# Patient Record
Sex: MALE | Race: WHITE | Employment: OTHER | URBAN - METROPOLITAN AREA
[De-identification: names, ages, dates, MRNs, and addresses within clinical notes are randomized per-mention and may not be internally consistent; named-entity substitution may affect disease eponyms.]

---

## 2021-02-20 ENCOUNTER — HOSPITAL ENCOUNTER (EMERGENCY)
Age: 61
Discharge: HOME | End: 2021-02-21
Attending: EMERGENCY MEDICINE
Payer: MEDICARE

## 2021-02-20 ENCOUNTER — APPOINTMENT (OUTPATIENT)
Dept: GENERAL RADIOLOGY | Age: 61
End: 2021-02-20
Payer: MEDICARE

## 2021-02-20 DIAGNOSIS — R91.1 PULMONARY NODULE, RIGHT: ICD-10-CM

## 2021-02-20 DIAGNOSIS — R50.9 FEVER, UNSPECIFIED: ICD-10-CM

## 2021-02-20 DIAGNOSIS — T78.2XXA ANAPHYLAXIS, INITIAL ENCOUNTER: Primary | ICD-10-CM

## 2021-02-20 LAB
PRO-BNP: 597 PG/ML (ref 0–124)
PROCALCITONIN: 0.07 NG/ML (ref 0–0.15)

## 2021-02-20 PROCEDURE — 80053 COMPREHEN METABOLIC PANEL: CPT

## 2021-02-20 PROCEDURE — 83880 ASSAY OF NATRIURETIC PEPTIDE: CPT

## 2021-02-20 PROCEDURE — 96367 TX/PROPH/DG ADDL SEQ IV INF: CPT

## 2021-02-20 PROCEDURE — 2500000003 HC RX 250 WO HCPCS: Performed by: EMERGENCY MEDICINE

## 2021-02-20 PROCEDURE — 99285 EMERGENCY DEPT VISIT HI MDM: CPT

## 2021-02-20 PROCEDURE — 83605 ASSAY OF LACTIC ACID: CPT

## 2021-02-20 PROCEDURE — 2580000003 HC RX 258: Performed by: EMERGENCY MEDICINE

## 2021-02-20 PROCEDURE — 87635 SARS-COV-2 COVID-19 AMP PRB: CPT

## 2021-02-20 PROCEDURE — 6360000002 HC RX W HCPCS: Performed by: EMERGENCY MEDICINE

## 2021-02-20 PROCEDURE — 96372 THER/PROPH/DIAG INJ SC/IM: CPT

## 2021-02-20 PROCEDURE — 96375 TX/PRO/DX INJ NEW DRUG ADDON: CPT

## 2021-02-20 PROCEDURE — 84145 PROCALCITONIN (PCT): CPT

## 2021-02-20 PROCEDURE — 96366 THER/PROPH/DIAG IV INF ADDON: CPT

## 2021-02-20 PROCEDURE — 96365 THER/PROPH/DIAG IV INF INIT: CPT

## 2021-02-20 PROCEDURE — 71045 X-RAY EXAM CHEST 1 VIEW: CPT

## 2021-02-20 PROCEDURE — 85025 COMPLETE CBC W/AUTO DIFF WBC: CPT

## 2021-02-20 PROCEDURE — 87040 BLOOD CULTURE FOR BACTERIA: CPT

## 2021-02-20 RX ORDER — METHYLPREDNISOLONE SODIUM SUCCINATE 125 MG/2ML
125 INJECTION, POWDER, LYOPHILIZED, FOR SOLUTION INTRAMUSCULAR; INTRAVENOUS ONCE
Status: COMPLETED | OUTPATIENT
Start: 2021-02-20 | End: 2021-02-20

## 2021-02-20 RX ORDER — PREDNISONE 20 MG/1
60 TABLET ORAL ONCE
Status: DISCONTINUED | OUTPATIENT
Start: 2021-02-20 | End: 2021-02-20

## 2021-02-20 RX ORDER — 0.9 % SODIUM CHLORIDE 0.9 %
1000 INTRAVENOUS SOLUTION INTRAVENOUS ONCE
Status: COMPLETED | OUTPATIENT
Start: 2021-02-20 | End: 2021-02-21

## 2021-02-20 RX ORDER — KETOROLAC TROMETHAMINE 30 MG/ML
15 INJECTION, SOLUTION INTRAMUSCULAR; INTRAVENOUS ONCE
Status: COMPLETED | OUTPATIENT
Start: 2021-02-20 | End: 2021-02-20

## 2021-02-20 RX ADMIN — KETOROLAC TROMETHAMINE 15 MG: 30 INJECTION, SOLUTION INTRAMUSCULAR; INTRAVENOUS at 23:42

## 2021-02-20 RX ADMIN — EPINEPHRINE 0.3 MG: 1 INJECTION INTRAMUSCULAR; INTRAVENOUS; SUBCUTANEOUS at 23:38

## 2021-02-20 RX ADMIN — FAMOTIDINE 20 MG: 10 INJECTION, SOLUTION INTRAVENOUS at 23:07

## 2021-02-20 RX ADMIN — CEFTRIAXONE SODIUM 1000 MG: 1 INJECTION, POWDER, FOR SOLUTION INTRAMUSCULAR; INTRAVENOUS at 23:57

## 2021-02-20 RX ADMIN — SODIUM CHLORIDE 1000 ML: 9 INJECTION, SOLUTION INTRAVENOUS at 23:27

## 2021-02-20 RX ADMIN — METHYLPREDNISOLONE SODIUM SUCCINATE 125 MG: 125 INJECTION, POWDER, FOR SOLUTION INTRAMUSCULAR; INTRAVENOUS at 23:07

## 2021-02-21 ENCOUNTER — APPOINTMENT (OUTPATIENT)
Dept: CT IMAGING | Age: 61
End: 2021-02-21
Payer: MEDICARE

## 2021-02-21 VITALS
OXYGEN SATURATION: 98 % | SYSTOLIC BLOOD PRESSURE: 136 MMHG | RESPIRATION RATE: 20 BRPM | DIASTOLIC BLOOD PRESSURE: 84 MMHG | TEMPERATURE: 97.7 F | HEART RATE: 85 BPM | BODY MASS INDEX: 33.86 KG/M2 | HEIGHT: 72 IN | WEIGHT: 250 LBS

## 2021-02-21 LAB
A/G RATIO: 1.1 (ref 1.1–2.2)
ALBUMIN SERPL-MCNC: 3.4 G/DL (ref 3.4–5)
ALP BLD-CCNC: 75 U/L (ref 40–129)
ALT SERPL-CCNC: 17 U/L (ref 10–40)
ANION GAP SERPL CALCULATED.3IONS-SCNC: 14 MMOL/L (ref 3–16)
AST SERPL-CCNC: 16 U/L (ref 15–37)
BASOPHILS ABSOLUTE: 0.1 K/UL (ref 0–0.2)
BASOPHILS RELATIVE PERCENT: 0.4 %
BILIRUB SERPL-MCNC: 0.7 MG/DL (ref 0–1)
BUN BLDV-MCNC: 11 MG/DL (ref 7–20)
CALCIUM SERPL-MCNC: 8.7 MG/DL (ref 8.3–10.6)
CHLORIDE BLD-SCNC: 98 MMOL/L (ref 99–110)
CO2: 24 MMOL/L (ref 21–32)
CREAT SERPL-MCNC: 1 MG/DL (ref 0.8–1.3)
EOSINOPHILS ABSOLUTE: 0.2 K/UL (ref 0–0.6)
EOSINOPHILS RELATIVE PERCENT: 0.9 %
GFR AFRICAN AMERICAN: >60
GFR NON-AFRICAN AMERICAN: >60
GLOBULIN: 3.1 G/DL
GLUCOSE BLD-MCNC: 135 MG/DL (ref 70–99)
HCT VFR BLD CALC: 45.2 % (ref 40.5–52.5)
HEMOGLOBIN: 15.1 G/DL (ref 13.5–17.5)
LACTIC ACID, SEPSIS: 1.4 MMOL/L (ref 0.4–1.9)
LYMPHOCYTES ABSOLUTE: 3.1 K/UL (ref 1–5.1)
LYMPHOCYTES RELATIVE PERCENT: 17.2 %
MCH RBC QN AUTO: 32.6 PG (ref 26–34)
MCHC RBC AUTO-ENTMCNC: 33.4 G/DL (ref 31–36)
MCV RBC AUTO: 97.6 FL (ref 80–100)
MONOCYTES ABSOLUTE: 0.5 K/UL (ref 0–1.3)
MONOCYTES RELATIVE PERCENT: 2.6 %
NEUTROPHILS ABSOLUTE: 14.4 K/UL (ref 1.7–7.7)
NEUTROPHILS RELATIVE PERCENT: 78.9 %
PDW BLD-RTO: 12.7 % (ref 12.4–15.4)
PLATELET # BLD: 327 K/UL (ref 135–450)
PLATELET SLIDE REVIEW: ADEQUATE
PMV BLD AUTO: 9.2 FL (ref 5–10.5)
POTASSIUM REFLEX MAGNESIUM: 3.6 MMOL/L (ref 3.5–5.1)
RAPID INFLUENZA  B AGN: NEGATIVE
RAPID INFLUENZA A AGN: NEGATIVE
RBC # BLD: 4.63 M/UL (ref 4.2–5.9)
S PYO AG THROAT QL: NEGATIVE
SARS-COV-2, NAAT: NOT DETECTED
SODIUM BLD-SCNC: 136 MMOL/L (ref 136–145)
TOTAL PROTEIN: 6.5 G/DL (ref 6.4–8.2)
WBC # BLD: 18.3 K/UL (ref 4–11)

## 2021-02-21 PROCEDURE — 87880 STREP A ASSAY W/OPTIC: CPT

## 2021-02-21 PROCEDURE — 87804 INFLUENZA ASSAY W/OPTIC: CPT

## 2021-02-21 PROCEDURE — 6360000002 HC RX W HCPCS: Performed by: EMERGENCY MEDICINE

## 2021-02-21 PROCEDURE — 2580000003 HC RX 258: Performed by: EMERGENCY MEDICINE

## 2021-02-21 PROCEDURE — 87081 CULTURE SCREEN ONLY: CPT

## 2021-02-21 PROCEDURE — 6360000004 HC RX CONTRAST MEDICATION: Performed by: EMERGENCY MEDICINE

## 2021-02-21 PROCEDURE — 71260 CT THORAX DX C+: CPT

## 2021-02-21 PROCEDURE — 74176 CT ABD & PELVIS W/O CONTRAST: CPT

## 2021-02-21 RX ORDER — DOXYCYCLINE 100 MG/1
100 TABLET ORAL 2 TIMES DAILY
Qty: 20 TABLET | Refills: 0 | Status: SHIPPED | OUTPATIENT
Start: 2021-02-21 | End: 2021-03-03

## 2021-02-21 RX ORDER — PREDNISONE 50 MG/1
50 TABLET ORAL DAILY
Qty: 2 TABLET | Refills: 0 | Status: SHIPPED | OUTPATIENT
Start: 2021-02-21 | End: 2021-02-23

## 2021-02-21 RX ORDER — EPINEPHRINE 0.3 MG/.3ML
0.3 INJECTION SUBCUTANEOUS ONCE
Qty: 0.3 ML | Refills: 0 | Status: SHIPPED | OUTPATIENT
Start: 2021-02-21 | End: 2021-02-21

## 2021-02-21 RX ADMIN — DEXTROSE MONOHYDRATE 500 MG: 50 INJECTION, SOLUTION INTRAVENOUS at 00:32

## 2021-02-21 RX ADMIN — IOPAMIDOL 85 ML: 755 INJECTION, SOLUTION INTRAVENOUS at 01:00

## 2021-02-21 NOTE — ED PROVIDER NOTES
[address] ED    CHIEF COMPLAINT  Allergic Reaction (pt brought in by EMS for mouth swelling after using throat spray) and Cough (X1 week denies fever at home. )       HISTORY OF PRESENT ILLNESS  Fabiano Oneill is a 64 y.o. male who presents to the ED with concern for allergic reaction. Cough x 1 week, nonproductive. Used a cholraseptic spray this evening. An hour later, developed swelling of the tongue and lips. Presents from home by EMS who administered 50 mg diphenhydramine en route with some subsequent improvement in edema. Denies fever, chest pain. Complains of mild SOB. Denies nausea, vomiting, diarrhea. Denies dysuria, hematuria. No sick contacts. Denies known COVID19 exposure. Denies change in taste/smell. Denies recent travel. No other complaints, modifying factors or associated symptoms. I have reviewed the following from the nursing documentation:    Past Medical History:   Diagnosis Date    Hypercholesteremia     Hypertension     MS (multiple sclerosis) ([facility] [address]      Past Surgical History:   Procedure Laterality Date    EYE SURGERY       History reviewed. No pertinent family history.   Social History     Socioeconomic History    Marital status:      Spouse name: Not on file    Number of children: Not on file    Years of education: Not on file    Highest education level: Not on file   Occupational History    Not on file   Social Needs    Financial resource strain: Not on file    Food insecurity     Worry: Not on file     Inability: Not on file    Transportation needs     Medical: Not on file     Non-medical: Not on file   Tobacco Use    Smoking status: Former Smoker     Packs/day: 2.00     Types: Cigarettes    Smokeless tobacco: Never Used   Substance and Sexual Activity    Alcohol use: Yes     Comment: 6 beers per week    Drug use: No    Sexual activity: Not on file   Lifestyle    Physical activity     Days per week: Not on file     Minutes per session: Not on file    Stress: Not on file   Relationships    Social connections     Talks on phone: Not on file     Gets together: Not on file     Attends Restorationism service: Not on file     Active member of club or organization: Not on file     Attends meetings of clubs or organizations: Not on file     Relationship status: Not on file    Intimate partner violence     Fear of current or ex partner: Not on file     Emotionally abused: Not on file     Physically abused: Not on file     Forced sexual activity: Not on file   Other Topics Concern    Not on file   Social History Narrative    Not on file     No current facility-administered medications for this encounter. Current Outpatient Medications   Medication Sig Dispense Refill    predniSONE (DELTASONE) 50 MG tablet Take 1 tablet by mouth daily for 2 days 2 tablet 0    EPINEPHrine (EPIPEN 2-BEE) 0.3 MG/0.3ML SOAJ injection Inject 0.3 mLs into the muscle once for 1 dose Use as directed for allergic reaction 0.3 mL 0    doxycycline monohydrate (ADOXA) 100 MG tablet Take 1 tablet by mouth 2 times daily for 10 days 20 tablet 0    amantadine (SYMMETREL) 100 MG capsule Take 100 mg by mouth 2 times daily      vitamin D (CHOLECALCIFEROL) 1000 UNIT TABS tablet Take 1,000 Units by mouth daily      HYDROcodone-acetaminophen (NORCO) 5-325 MG per tablet Take 1 tablet by mouth every 6 hours as needed for Pain.  albuterol sulfate HFA (PROVENTIL HFA) 108 (90 BASE) MCG/ACT inhaler Inhale 2 puffs into the lungs every 4 hours as needed for Wheezing or Shortness of Breath With spacer (and mask if indicated). Thanks. 1 Inhaler 1    propranolol (INDERAL) 20 MG tablet Take 60 mg by mouth daily.  baclofen (LIORESAL) 10 MG tablet Take 20 mg by mouth 3 times daily.  interferon beta-1a (AVONEX) 30 MCG injection Inject 30 mcg into the muscle once a week.  losartan (COZAAR) 25 MG tablet Take 25 mg by mouth daily.         simvastatin (ZOCOR) 40 MG tablet Take 40 mg by mouth nightly. Allergies   Allergen Reactions    Bee Venom        REVIEW OF SYSTEMS  10 systems reviewed, pertinent positives and negatives per HPI, otherwise noted to be negative. PHYSICAL EXAM  ED Triage Vitals   Enc Vitals Group      BP 02/20/21 2306 (!) 155/87      Pulse 02/20/21 2306 125      Resp 02/20/21 2306 26      Temp 02/20/21 2309 102.5 °F (39.2 °C)      Temp Source 02/20/21 2309 Oral      SpO2 02/20/21 2306 97 %      Weight 02/20/21 2309 250 lb (113.4 kg)      Height 02/20/21 2309 6' (1.829 m)      Head Circumference --       Peak Flow --       Pain Score --       Pain Loc --       Pain Edu? --       Excl. in [address]? --      General appearance: Awake and alert. Cooperative. Anxious appearing. HENT: Normocephalic. Atraumatic. Mucous membranes are moist. There is edema of the upper and lower lips and tongue. Mild edema of posterior orpharynx. Mallampati 3. No uvular edema or deviation. Managing secretions easily. Neck: Supple. Trachea midline. Eyes: PERRL. EOMI. Heart/Chest: Tachy rate, regular rhythm. No murmurs. Lungs: Respirations unlabored. Scant diffuse expiratory wheeze. No rales or rhonchi. Mildly diminished. Speaking in full sentences. Abdomen: Soft. Non-tender. Non-distended. No rebound or guarding. Musculoskeletal: No extremity edema. No deformity. No tenderness in the extremities. All extremities neurovascularly intact. Skin: Warm and dry. No acute rashes. Neurological: Alert and oriented. CN II-XII intact. Strength 5/5 bilateral upper and lower extremities. Sensation intact to light touch. Psychiatric: Mood/affect: normal      LABS  I have reviewed all labs for this visit.    Results for orders placed or performed during the hospital encounter of 02/20/21   COVID-19, Rapid    Specimen: Nasopharyngeal Swab   Result Value Ref Range    SARS-CoV-2, NAAT Not Detected Not Detected   Strep Screen Group A Throat    Specimen: Throat   Result Value Ref Range    Rapid Strep A Screen Negative Negative   Rapid influenza A/B antigens    Specimen: Nasopharyngeal   Result Value Ref Range    Rapid Influenza A Ag Negative Negative    Rapid Influenza B Ag Negative Negative   CBC Auto Differential   Result Value Ref Range    WBC 18.3 (H) 4.0 - 11.0 K/uL    RBC 4.63 4.20 - 5.90 M/uL    Hemoglobin 15.1 13.5 - 17.5 g/dL    Hematocrit 45.2 40.5 - 52.5 %    MCV 97.6 80.0 - 100.0 fL    MCH 32.6 26.0 - 34.0 pg    MCHC 33.4 31.0 - 36.0 g/dL    RDW 12.7 12.4 - 15.4 %    Platelets 381 691 - 486 K/uL    MPV 9.2 5.0 - 10.5 fL    PLATELET SLIDE REVIEW Adequate     Neutrophils % 78.9 %    Lymphocytes % 17.2 %    Monocytes % 2.6 %    Eosinophils % 0.9 %    Basophils % 0.4 %    Neutrophils Absolute 14.4 (H) 1.7 - 7.7 K/uL    Lymphocytes Absolute 3.1 1.0 - 5.1 K/uL    Monocytes Absolute 0.5 0.0 - 1.3 K/uL    Eosinophils Absolute 0.2 0.0 - 0.6 K/uL    Basophils Absolute 0.1 0.0 - 0.2 K/uL   Comprehensive Metabolic Panel w/ Reflex to MG   Result Value Ref Range    Sodium 136 136 - 145 mmol/L    Potassium reflex Magnesium 3.6 3.5 - 5.1 mmol/L    Chloride 98 (L) 99 - 110 mmol/L    CO2 24 21 - 32 mmol/L    Anion Gap 14 3 - 16    Glucose 135 (H) 70 - 99 mg/dL    BUN 11 7 - 20 mg/dL    CREATININE 1.0 0.8 - 1.3 mg/dL    GFR Non-African American >60 >60    GFR African American >60 >60    Calcium 8.7 8.3 - 10.6 mg/dL    Total Protein 6.5 6.4 - 8.2 g/dL    Albumin 3.4 3.4 - 5.0 g/dL    Albumin/Globulin Ratio 1.1 1.1 - 2.2    Total Bilirubin 0.7 0.0 - 1.0 mg/dL    Alkaline Phosphatase 75 40 - 129 U/L    ALT 17 10 - 40 U/L    AST 16 15 - 37 U/L    Globulin 3.1 g/dL   Brain Natriuretic Peptide   Result Value Ref Range    Pro- (H) 0 - 124 pg/mL   Procalcitonin   Result Value Ref Range    Procalcitonin 0.07 0.00 - 0.15 ng/mL   Lactate, Sepsis   Result Value Ref Range    Lactic Acid, Sepsis 1.4 0.4 - 1.9 mmol/L       RADIOLOGY  I have reviewed all radiographic studies for this visit.    CT ABDOMEN PELVIS WO CONTRAST Additional Contrast? None   Preliminary Result   1. No evidence of a mesenteric mass. 2. Mild induration in the mesenteric fat which is nonspecific. Underlying   enteritis cannot be excluded. 3. Nonspecific thickening of the bladder wall which is distended. Underlying   infection can also not be excluded. 4. Mild ascites. 5. Fatty liver. CT CHEST PULMONARY EMBOLISM W CONTRAST   Final Result   1. No evidence of pulmonary embolism   2. Questionable soft tissue mesenteric mass versus inflamed loop of bowel   partially visualized within the left upper abdomen. Dedicated CT imaging of   the abdomen and pelvis recommended for further evaluation. 3. Diffuse hepatic steatosis         XR CHEST PORTABLE   Final Result   No acute process. ED COURSE/MDM  Patient seen and evaluated. Old records reviewed. Labs and imaging reviewed and results discussed with patient/family to extent possible. This is a 70-year-old male who presents with allergic reaction after using a Chloraseptic spray. On arrival the patient is febrile with commensurate tachycardia. Exam is notable for edema of the upper and lower lips, tongue, and posterior oropharynx. The patient is managing his secretions easily. No increased work of breathing. No supplemental oxygen requirement. There is slight wheezing. There is no rash. Patient meets criteria for anaphylaxis given rapid onset of symptoms with mucosal edema of the oropharynx and associated wheezing. Was administered 0.3 mg epinephrine intramuscularly. Patient was administered Solu-Medrol and famotidine. Already received diphenhydramine in route. Given the fever and tachycardia patient meets sirs criteria. In the context of cough the likely etiology is respiratory. Ceftriaxone and azithromycin were administered. Toradol administered for fever. 1 L IV normal saline bolus empirically. Chest x-ray nothing acute. Leukocytosis to 18.3.   CTPA was obtained which revealed no evidence of pulmonary embolism or other acute pulmonary abnormality. Does show 4 mm pulmonary nodule. Patient was due for surveillance CT and was informed of this finding. Will follow with PCP for further management of the pulmonary nodule. CTPA did show questionable soft tissue mesenteric mass versus inflamed loop of bowel in the left upper abdomen. The patient has no abdominal or gastrointestinal complaints. Did obtain CT abdomen and pelvis which showed no evidence of mesenteric mass. Did show mild induration in the mesenteric fat, nonspecific. Patient had diarrhea several weeks ago but none recently. Low suspicion for enteritis. Did show nonspecific thickening of the bladder wall, nonspecific. The patient denies dysuria. Low suspicion for UTI. Procalcitonin was within normal limits. In the absence of an elevated procalcitonin with no clear source of a bacterial infection suspicion for sepsis is low. Rapid Covid negative. Rapid flu negative. Rapid strep negative. Patient had near resolution of oropharyngeal edema and wheezing after the above interventions. He was observed for greater than 4 hours in the emergency department and manifested no rebound symptoms. He is feeling entirely well at this time. Tachycardia has resolved. Fever has resolved. Given improvement in clinical status believe patient is appropriate for discharge to home. Will prescribe course of doxycycline given cough/fever. Will prescribe short steroid burst and advised patient to use antihistamine. Will prescribe EpiPen. Close follow-up with PCP in several days. Usual strict return precautions for new or worsening symptoms communicated.     During the patient's ED course, the patient was given:  Medications   methylPREDNISolone sodium (SOLU-MEDROL) injection 125 mg (125 mg Intravenous Given 2/20/21 2307)   famotidine (PEPCID) injection 20 mg (20 mg Intravenous Given 2/20/21 2307)   0.9 % sodium chloride bolus (0 mLs Intravenous Stopped 2/21/21 0038)   cefTRIAXone (ROCEPHIN) 1000 mg IVPB in 50 mL D5W minibag (0 g Intravenous Stopped 2/21/21 0038)   azithromycin (ZITHROMAX) 500 mg in D5W 250ml addavial (0 mg Intravenous Stopped 2/21/21 0159)   ketorolac (TORADOL) injection 15 mg (15 mg Intravenous Given 2/20/21 2342)   EPINEPHrine 1 MG/ML injection 0.3 mg (0.3 mg Intramuscular Given 2/20/21 2338)   iopamidol (ISOVUE-370) 76 % injection 85 mL (85 mLs Intravenous Given 2/21/21 0100)        All questions were answered and the patient/family expressed understanding and agreement with the plan. PROCEDURES  None    CRITICAL CARE  Total critical care time provided today was at least 31 minutes. This excludes seperately billable procedures. Critical care time was provided for anaphylactic reaction requiring epinephrine and that required close evaluation and/or intervention with concern for potential patient decompensation. CLINICAL IMPRESSION  1. Anaphylaxis, initial encounter    2. Fever, unspecified    3. Pulmonary nodule, right        DISPOSITION   discharge     Ruth Peña MD    Note: This chart was created using voice recognition dictation software. Efforts were made by me to ensure accuracy, however some errors may be present due to limitations of this technology and occasionally words are not transcribed correctly.         Ruth Peña MD  02/21/21 [id number]

## 2021-02-23 LAB — S PYO THROAT QL CULT: NORMAL

## 2021-02-25 LAB
BLOOD CULTURE, ROUTINE: NORMAL
CULTURE, BLOOD 2: NORMAL

## 2024-08-20 ENCOUNTER — APPOINTMENT (OUTPATIENT)
Dept: CT IMAGING | Age: 64
End: 2024-08-20
Payer: OTHER GOVERNMENT

## 2024-08-20 ENCOUNTER — HOSPITAL ENCOUNTER (EMERGENCY)
Age: 64
Discharge: ANOTHER HOSPITAL | End: 2024-08-21
Attending: STUDENT IN AN ORGANIZED HEALTH CARE EDUCATION/TRAINING PROGRAM
Payer: OTHER GOVERNMENT

## 2024-08-20 ENCOUNTER — APPOINTMENT (OUTPATIENT)
Dept: GENERAL RADIOLOGY | Age: 64
End: 2024-08-20
Payer: OTHER GOVERNMENT

## 2024-08-20 DIAGNOSIS — R07.9 CHEST PAIN, UNSPECIFIED TYPE: Primary | ICD-10-CM

## 2024-08-20 LAB
ALBUMIN SERPL-MCNC: 4.1 G/DL (ref 3.4–5)
ALBUMIN/GLOB SERPL: 1.5 {RATIO} (ref 1.1–2.2)
ALP SERPL-CCNC: 91 U/L (ref 40–129)
ALT SERPL-CCNC: 45 U/L (ref 10–40)
ANION GAP SERPL CALCULATED.3IONS-SCNC: 11 MMOL/L (ref 3–16)
ANISOCYTOSIS BLD QL SMEAR: ABNORMAL
AST SERPL-CCNC: 21 U/L (ref 15–37)
BASE EXCESS BLDV CALC-SCNC: -2.2 MMOL/L (ref -3–3)
BASOPHILS # BLD: 0 K/UL (ref 0–0.2)
BASOPHILS NFR BLD: 0 %
BILIRUB SERPL-MCNC: 0.3 MG/DL (ref 0–1)
BUN SERPL-MCNC: 9 MG/DL (ref 7–20)
CALCIUM SERPL-MCNC: 8.9 MG/DL (ref 8.3–10.6)
CHLORIDE SERPL-SCNC: 101 MMOL/L (ref 99–110)
CO2 BLDV-SCNC: 25 MMOL/L
CO2 SERPL-SCNC: 25 MMOL/L (ref 21–32)
COHGB MFR BLDV: 5.5 % (ref 0–1.5)
CREAT SERPL-MCNC: 0.7 MG/DL (ref 0.8–1.3)
DEPRECATED RDW RBC AUTO: 15.1 % (ref 12.4–15.4)
EOSINOPHIL # BLD: 0.3 K/UL (ref 0–0.6)
EOSINOPHIL NFR BLD: 2 %
FLUAV RNA RESP QL NAA+PROBE: NOT DETECTED
FLUBV RNA RESP QL NAA+PROBE: NOT DETECTED
GFR SERPLBLD CREATININE-BSD FMLA CKD-EPI: >90 ML/MIN/{1.73_M2}
GLUCOSE SERPL-MCNC: 152 MG/DL (ref 70–99)
HCO3 BLDV-SCNC: 23.9 MMOL/L (ref 23–29)
HCT VFR BLD AUTO: 40.3 % (ref 40.5–52.5)
HGB BLD-MCNC: 13.3 G/DL (ref 13.5–17.5)
LACTATE BLDV-SCNC: 2.2 MMOL/L (ref 0.4–2)
LYMPHOCYTES # BLD: 3.5 K/UL (ref 1–5.1)
LYMPHOCYTES NFR BLD: 24 %
MACROCYTES BLD QL SMEAR: ABNORMAL
MCH RBC QN AUTO: 32.7 PG (ref 26–34)
MCHC RBC AUTO-ENTMCNC: 33 G/DL (ref 31–36)
MCV RBC AUTO: 99 FL (ref 80–100)
METHGB MFR BLDV: 0.3 %
MICROCYTES BLD QL SMEAR: ABNORMAL
MONOCYTES # BLD: 2.2 K/UL (ref 0–1.3)
MONOCYTES NFR BLD: 15 %
NEUTROPHILS # BLD: 8.6 K/UL (ref 1.7–7.7)
NEUTROPHILS NFR BLD: 50 %
NEUTS BAND NFR BLD MANUAL: 9 % (ref 0–7)
NT-PROBNP SERPL-MCNC: 129 PG/ML (ref 0–124)
O2 THERAPY: ABNORMAL
PATH INTERP BLD-IMP: YES
PCO2 BLDV: 45.8 MMHG (ref 40–50)
PH BLDV: 7.33 [PH] (ref 7.35–7.45)
PLATELET # BLD AUTO: 451 K/UL (ref 135–450)
PLATELET BLD QL SMEAR: ADEQUATE
PMV BLD AUTO: 8.4 FL (ref 5–10.5)
PO2 BLDV: 25.1 MMHG (ref 25–40)
POIKILOCYTOSIS BLD QL SMEAR: ABNORMAL
POLYCHROMASIA BLD QL SMEAR: ABNORMAL
POTASSIUM SERPL-SCNC: 4.3 MMOL/L (ref 3.5–5.1)
PROT SERPL-MCNC: 6.8 G/DL (ref 6.4–8.2)
RBC # BLD AUTO: 4.07 M/UL (ref 4.2–5.9)
SAO2 % BLDV: 45 %
SARS-COV-2 RNA RESP QL NAA+PROBE: NOT DETECTED
SLIDE REVIEW: ABNORMAL
SODIUM SERPL-SCNC: 137 MMOL/L (ref 136–145)
TROPONIN, HIGH SENSITIVITY: 22 NG/L (ref 0–22)
TROPONIN, HIGH SENSITIVITY: 24 NG/L (ref 0–22)
WBC # BLD AUTO: 14.5 K/UL (ref 4–11)

## 2024-08-20 PROCEDURE — 71045 X-RAY EXAM CHEST 1 VIEW: CPT

## 2024-08-20 PROCEDURE — 83605 ASSAY OF LACTIC ACID: CPT

## 2024-08-20 PROCEDURE — 87636 SARSCOV2 & INF A&B AMP PRB: CPT

## 2024-08-20 PROCEDURE — 2580000003 HC RX 258: Performed by: STUDENT IN AN ORGANIZED HEALTH CARE EDUCATION/TRAINING PROGRAM

## 2024-08-20 PROCEDURE — 85025 COMPLETE CBC W/AUTO DIFF WBC: CPT

## 2024-08-20 PROCEDURE — 80053 COMPREHEN METABOLIC PANEL: CPT

## 2024-08-20 PROCEDURE — 83880 ASSAY OF NATRIURETIC PEPTIDE: CPT

## 2024-08-20 PROCEDURE — 6360000004 HC RX CONTRAST MEDICATION: Performed by: STUDENT IN AN ORGANIZED HEALTH CARE EDUCATION/TRAINING PROGRAM

## 2024-08-20 PROCEDURE — 93005 ELECTROCARDIOGRAM TRACING: CPT | Performed by: STUDENT IN AN ORGANIZED HEALTH CARE EDUCATION/TRAINING PROGRAM

## 2024-08-20 PROCEDURE — 82803 BLOOD GASES ANY COMBINATION: CPT

## 2024-08-20 PROCEDURE — 96360 HYDRATION IV INFUSION INIT: CPT

## 2024-08-20 PROCEDURE — 84484 ASSAY OF TROPONIN QUANT: CPT

## 2024-08-20 PROCEDURE — 99285 EMERGENCY DEPT VISIT HI MDM: CPT

## 2024-08-20 PROCEDURE — 36415 COLL VENOUS BLD VENIPUNCTURE: CPT

## 2024-08-20 PROCEDURE — 71260 CT THORAX DX C+: CPT

## 2024-08-20 RX ORDER — SODIUM CHLORIDE, SODIUM LACTATE, POTASSIUM CHLORIDE, AND CALCIUM CHLORIDE .6; .31; .03; .02 G/100ML; G/100ML; G/100ML; G/100ML
500 INJECTION, SOLUTION INTRAVENOUS ONCE
Status: COMPLETED | OUTPATIENT
Start: 2024-08-20 | End: 2024-08-20

## 2024-08-20 RX ADMIN — IOPAMIDOL 75 ML: 755 INJECTION, SOLUTION INTRAVENOUS at 20:56

## 2024-08-20 RX ADMIN — SODIUM CHLORIDE, POTASSIUM CHLORIDE, SODIUM LACTATE AND CALCIUM CHLORIDE 500 ML: 600; 310; 30; 20 INJECTION, SOLUTION INTRAVENOUS at 21:27

## 2024-08-20 ASSESSMENT — HEART SCORE: ECG: NORMAL

## 2024-08-20 ASSESSMENT — PAIN - FUNCTIONAL ASSESSMENT: PAIN_FUNCTIONAL_ASSESSMENT: 0-10

## 2024-08-20 ASSESSMENT — PAIN DESCRIPTION - ORIENTATION: ORIENTATION: MID

## 2024-08-20 ASSESSMENT — PAIN SCALES - GENERAL: PAINLEVEL_OUTOF10: 3

## 2024-08-20 ASSESSMENT — PAIN DESCRIPTION - DESCRIPTORS: DESCRIPTORS: DISCOMFORT;PRESSURE

## 2024-08-20 ASSESSMENT — PAIN DESCRIPTION - LOCATION: LOCATION: CHEST

## 2024-08-20 NOTE — ED PROVIDER NOTES
[facility]  ED     EMERGENCY DEPARTMENT ENCOUNTER         Pt Name: Ricardo Campbell   MRN: [id number]   Birthdate 1960   Date of evaluation: 8/20/2024   Provider: Aashish Dietz MD   PCP: No primary care provider on file.   Note Started: 7:42 PM EDT 8/20/24       Chief Complaint     Chest Pain      History of Present Illness     Ricardo Campbell is a 64 y.o. male who presents with chest pressure now resolved.  Of note the patient is currently on chemotherapy for lung cancer and also has a new diagnosis of atrial fibrillation paroxysmal currently on anticoagulation.  The patient has generally been doing well though yesterday experienced chest pressure with some radiation to the jaw and shoulders.  No history of similar.  The pain persisted overnight but seems somewhat resolved today however with concern that he may be suffering a cardiac condition he is ultimately brought by EMS for evaluation.  He received aspirin and nitroglycerin prior to arrival.  On my assessment he is currently asymptomatic.  Reports that he had some associated shortness of breath and diaphoresis during his chest pressure.  He reports that he had a stress test sometime ago unclear what the results were.  He follows with [facility].      I have reviewed the nursing notes and agree unless otherwise noted.    Review of Systems     Positives and pertinent negatives as per HPI.    Past Medical, Surgical, Family, and Social History     He has a past medical history of Hypercholesteremia, Hypertension, and MS (multiple sclerosis) (HCC).  He has a past surgical history that includes Eye surgery.  His family history is not on file.  He reports that he has quit smoking. His smoking use included cigarettes. He has never used smokeless tobacco. He reports current alcohol use. He reports that he does not use drugs.    SCREENINGS:          Guillermina Coma Scale  Eye Opening: Spontaneous  Best Verbal Response: Oriented  Best Motor

## 2024-08-20 NOTE — ED TRIAGE NOTES
Pt arrives via EMS for radiating mid sternal CP and chest pressure x2 days. Ems reports 12 lead was done in the pt home and was noted to be AFIB. EMS then reports that pt had elevation in leads 2 and 3.   L bundle branch block present on the 12 lead with no formal dx. Pt has not taken his cardizem or metoprolol recently due to running out of meds. Pt A &O x4 at time of triage.        Hx of lung tumor, pt has started his first chemo treatment and is due for chemo tomorrow.    18g L AC placed en route by EMS.    Meds:  Cardizem   Metoprolol  Xarelto  Interferon     ASA and nitro given en route, pt reports relief of pain

## 2024-08-21 VITALS
BODY MASS INDEX: 33.86 KG/M2 | TEMPERATURE: 98.3 F | RESPIRATION RATE: 19 BRPM | OXYGEN SATURATION: 100 % | SYSTOLIC BLOOD PRESSURE: 150 MMHG | HEIGHT: 72 IN | HEART RATE: 96 BPM | DIASTOLIC BLOOD PRESSURE: 76 MMHG | WEIGHT: 250 LBS

## 2024-08-21 LAB
EKG ATRIAL RATE: 99 BPM
EKG DIAGNOSIS: NORMAL
EKG P AXIS: 69 DEGREES
EKG P-R INTERVAL: 142 MS
EKG Q-T INTERVAL: 336 MS
EKG QRS DURATION: 74 MS
EKG QTC CALCULATION (BAZETT): 431 MS
EKG R AXIS: 44 DEGREES
EKG T AXIS: 75 DEGREES
EKG VENTRICULAR RATE: 99 BPM
PATH INTERP BLD-IMP: NORMAL

## 2024-08-21 NOTE — ED NOTES
2158- called [facility] to initiate transfer for [facility] for continuity of care.   RE: Chest pain rule out  PER: [facility]  Awaiting [facility] assignment & paperwork from [facility]

## 2024-11-19 PROCEDURE — 99285 EMERGENCY DEPT VISIT HI MDM: CPT

## 2024-11-20 ENCOUNTER — APPOINTMENT (OUTPATIENT)
Dept: GENERAL RADIOLOGY | Age: 64
End: 2024-11-20
Payer: OTHER GOVERNMENT

## 2024-11-20 ENCOUNTER — APPOINTMENT (OUTPATIENT)
Dept: CT IMAGING | Age: 64
End: 2024-11-20
Payer: OTHER GOVERNMENT

## 2024-11-20 ENCOUNTER — HOSPITAL ENCOUNTER (EMERGENCY)
Age: 64
Discharge: ANOTHER HOSPITAL | End: 2024-11-20
Attending: EMERGENCY MEDICINE
Payer: OTHER GOVERNMENT

## 2024-11-20 VITALS
OXYGEN SATURATION: 92 % | DIASTOLIC BLOOD PRESSURE: 74 MMHG | HEART RATE: 106 BPM | TEMPERATURE: 98.2 F | BODY MASS INDEX: 34.54 KG/M2 | RESPIRATION RATE: 16 BRPM | HEIGHT: 72 IN | WEIGHT: 255 LBS | SYSTOLIC BLOOD PRESSURE: 145 MMHG

## 2024-11-20 DIAGNOSIS — I26.99 OTHER ACUTE PULMONARY EMBOLISM WITHOUT ACUTE COR PULMONALE (HCC): ICD-10-CM

## 2024-11-20 DIAGNOSIS — I48.91 ATRIAL FIBRILLATION WITH RVR (HCC): Primary | ICD-10-CM

## 2024-11-20 DIAGNOSIS — J18.9 PNEUMONIA OF RIGHT UPPER LOBE DUE TO INFECTIOUS ORGANISM: ICD-10-CM

## 2024-11-20 LAB
ALBUMIN SERPL-MCNC: 3.6 G/DL (ref 3.4–5)
ALBUMIN/GLOB SERPL: 1.4 {RATIO} (ref 1.1–2.2)
ALP SERPL-CCNC: 54 U/L (ref 40–129)
ALT SERPL-CCNC: 52 U/L (ref 10–40)
ANION GAP SERPL CALCULATED.3IONS-SCNC: 15 MMOL/L (ref 3–16)
ANISOCYTOSIS BLD QL SMEAR: ABNORMAL
AST SERPL-CCNC: 37 U/L (ref 15–37)
BASOPHILS # BLD: 0 K/UL (ref 0–0.2)
BASOPHILS NFR BLD: 0 %
BILIRUB SERPL-MCNC: 0.3 MG/DL (ref 0–1)
BILIRUB UR QL STRIP.AUTO: NEGATIVE
BUN SERPL-MCNC: 31 MG/DL (ref 7–20)
CALCIUM SERPL-MCNC: 8.9 MG/DL (ref 8.3–10.6)
CHLORIDE SERPL-SCNC: 98 MMOL/L (ref 99–110)
CLARITY UR: CLEAR
CO2 SERPL-SCNC: 19 MMOL/L (ref 21–32)
COLOR UR: YELLOW
CREAT SERPL-MCNC: 1 MG/DL (ref 0.8–1.3)
DEPRECATED RDW RBC AUTO: 13.5 % (ref 12.4–15.4)
EKG DIAGNOSIS: NORMAL
EKG Q-T INTERVAL: 260 MS
EKG QRS DURATION: 78 MS
EKG QTC CALCULATION (BAZETT): 403 MS
EKG R AXIS: 50 DEGREES
EKG T AXIS: 33 DEGREES
EKG VENTRICULAR RATE: 145 BPM
EOSINOPHIL # BLD: 0 K/UL (ref 0–0.6)
EOSINOPHIL NFR BLD: 0 %
GFR SERPLBLD CREATININE-BSD FMLA CKD-EPI: 84 ML/MIN/{1.73_M2}
GLUCOSE SERPL-MCNC: 295 MG/DL (ref 70–99)
GLUCOSE UR STRIP.AUTO-MCNC: >=1000 MG/DL
HCT VFR BLD AUTO: 41.1 % (ref 40.5–52.5)
HGB BLD-MCNC: 13.7 G/DL (ref 13.5–17.5)
HGB UR QL STRIP.AUTO: NEGATIVE
KETONES UR STRIP.AUTO-MCNC: NEGATIVE MG/DL
LACTATE BLDV-SCNC: 1.9 MMOL/L (ref 0.4–2)
LEUKOCYTE ESTERASE UR QL STRIP.AUTO: NEGATIVE
LYMPHOCYTES # BLD: 0.9 K/UL (ref 1–5.1)
LYMPHOCYTES NFR BLD: 5 %
MACROCYTES BLD QL SMEAR: ABNORMAL
MAGNESIUM SERPL-MCNC: 2.36 MG/DL (ref 1.8–2.4)
MCH RBC QN AUTO: 34 PG (ref 26–34)
MCHC RBC AUTO-ENTMCNC: 33.3 G/DL (ref 31–36)
MCV RBC AUTO: 102.2 FL (ref 80–100)
MONOCYTES # BLD: 0.7 K/UL (ref 0–1.3)
MONOCYTES NFR BLD: 4 %
NEUTROPHILS # BLD: 16.8 K/UL (ref 1.7–7.7)
NEUTROPHILS NFR BLD: 90 %
NEUTS BAND NFR BLD MANUAL: 1 % (ref 0–7)
NITRITE UR QL STRIP.AUTO: NEGATIVE
NT-PROBNP SERPL-MCNC: 651 PG/ML (ref 0–124)
PH UR STRIP.AUTO: 6 [PH] (ref 5–8)
PHOSPHATE SERPL-MCNC: 3.5 MG/DL (ref 2.5–4.9)
PLATELET # BLD AUTO: 153 K/UL (ref 135–450)
PLATELET BLD QL SMEAR: ABNORMAL
PMV BLD AUTO: 7.1 FL (ref 5–10.5)
POTASSIUM SERPL-SCNC: 4.6 MMOL/L (ref 3.5–5.1)
PROT SERPL-MCNC: 6.1 G/DL (ref 6.4–8.2)
PROT UR STRIP.AUTO-MCNC: NEGATIVE MG/DL
RBC # BLD AUTO: 4.02 M/UL (ref 4.2–5.9)
SLIDE REVIEW: ABNORMAL
SODIUM SERPL-SCNC: 132 MMOL/L (ref 136–145)
SP GR UR STRIP.AUTO: 1.01 (ref 1–1.03)
TROPONIN, HIGH SENSITIVITY: 26 NG/L (ref 0–22)
TROPONIN, HIGH SENSITIVITY: 33 NG/L (ref 0–22)
UA COMPLETE W REFLEX CULTURE PNL UR: ABNORMAL
UA DIPSTICK W REFLEX MICRO PNL UR: ABNORMAL
URN SPEC COLLECT METH UR: ABNORMAL
UROBILINOGEN UR STRIP-ACNC: 0.2 E.U./DL
WBC # BLD AUTO: 18.5 K/UL (ref 4–11)

## 2024-11-20 PROCEDURE — 83735 ASSAY OF MAGNESIUM: CPT

## 2024-11-20 PROCEDURE — 81003 URINALYSIS AUTO W/O SCOPE: CPT

## 2024-11-20 PROCEDURE — 71260 CT THORAX DX C+: CPT

## 2024-11-20 PROCEDURE — 96374 THER/PROPH/DIAG INJ IV PUSH: CPT

## 2024-11-20 PROCEDURE — 36415 COLL VENOUS BLD VENIPUNCTURE: CPT

## 2024-11-20 PROCEDURE — 93005 ELECTROCARDIOGRAM TRACING: CPT | Performed by: EMERGENCY MEDICINE

## 2024-11-20 PROCEDURE — 6360000002 HC RX W HCPCS: Performed by: EMERGENCY MEDICINE

## 2024-11-20 PROCEDURE — 96366 THER/PROPH/DIAG IV INF ADDON: CPT

## 2024-11-20 PROCEDURE — 93010 ELECTROCARDIOGRAM REPORT: CPT | Performed by: INTERNAL MEDICINE

## 2024-11-20 PROCEDURE — 2580000003 HC RX 258: Performed by: EMERGENCY MEDICINE

## 2024-11-20 PROCEDURE — 85025 COMPLETE CBC W/AUTO DIFF WBC: CPT

## 2024-11-20 PROCEDURE — 96368 THER/DIAG CONCURRENT INF: CPT

## 2024-11-20 PROCEDURE — 87040 BLOOD CULTURE FOR BACTERIA: CPT

## 2024-11-20 PROCEDURE — 83880 ASSAY OF NATRIURETIC PEPTIDE: CPT

## 2024-11-20 PROCEDURE — 80053 COMPREHEN METABOLIC PANEL: CPT

## 2024-11-20 PROCEDURE — 6360000004 HC RX CONTRAST MEDICATION: Performed by: EMERGENCY MEDICINE

## 2024-11-20 PROCEDURE — 96375 TX/PRO/DX INJ NEW DRUG ADDON: CPT

## 2024-11-20 PROCEDURE — 2500000003 HC RX 250 WO HCPCS: Performed by: EMERGENCY MEDICINE

## 2024-11-20 PROCEDURE — 83605 ASSAY OF LACTIC ACID: CPT

## 2024-11-20 PROCEDURE — 84100 ASSAY OF PHOSPHORUS: CPT

## 2024-11-20 PROCEDURE — 71045 X-RAY EXAM CHEST 1 VIEW: CPT

## 2024-11-20 PROCEDURE — 84484 ASSAY OF TROPONIN QUANT: CPT

## 2024-11-20 PROCEDURE — 96365 THER/PROPH/DIAG IV INF INIT: CPT

## 2024-11-20 RX ORDER — DILTIAZEM HYDROCHLORIDE 5 MG/ML
20 INJECTION INTRAVENOUS ONCE
Status: COMPLETED | OUTPATIENT
Start: 2024-11-20 | End: 2024-11-20

## 2024-11-20 RX ORDER — MORPHINE SULFATE 4 MG/ML
4 INJECTION, SOLUTION INTRAMUSCULAR; INTRAVENOUS ONCE
Status: COMPLETED | OUTPATIENT
Start: 2024-11-20 | End: 2024-11-20

## 2024-11-20 RX ORDER — FENTANYL CITRATE 50 UG/ML
100 INJECTION, SOLUTION INTRAMUSCULAR; INTRAVENOUS ONCE
Status: COMPLETED | OUTPATIENT
Start: 2024-11-20 | End: 2024-11-20

## 2024-11-20 RX ORDER — HYDROMORPHONE HYDROCHLORIDE 1 MG/ML
1 INJECTION, SOLUTION INTRAMUSCULAR; INTRAVENOUS; SUBCUTANEOUS ONCE
Status: COMPLETED | OUTPATIENT
Start: 2024-11-20 | End: 2024-11-20

## 2024-11-20 RX ORDER — DIGOXIN 0.25 MG/ML
125 INJECTION INTRAMUSCULAR; INTRAVENOUS ONCE
Status: COMPLETED | OUTPATIENT
Start: 2024-11-20 | End: 2024-11-20

## 2024-11-20 RX ORDER — IOPAMIDOL 755 MG/ML
75 INJECTION, SOLUTION INTRAVASCULAR
Status: COMPLETED | OUTPATIENT
Start: 2024-11-20 | End: 2024-11-20

## 2024-11-20 RX ADMIN — FENTANYL CITRATE 100 MCG: 50 INJECTION INTRAMUSCULAR; INTRAVENOUS at 06:03

## 2024-11-20 RX ADMIN — SODIUM CHLORIDE 2.5 MG/HR: 900 INJECTION, SOLUTION INTRAVENOUS at 00:58

## 2024-11-20 RX ADMIN — FAMOTIDINE 20 MG: 10 INJECTION, SOLUTION INTRAVENOUS at 03:41

## 2024-11-20 RX ADMIN — HYDROMORPHONE HYDROCHLORIDE 1 MG: 1 INJECTION, SOLUTION INTRAMUSCULAR; INTRAVENOUS; SUBCUTANEOUS at 03:25

## 2024-11-20 RX ADMIN — DIGOXIN 125 MCG: 0.25 INJECTION INTRAMUSCULAR; INTRAVENOUS at 05:54

## 2024-11-20 RX ADMIN — DILTIAZEM HYDROCHLORIDE 20 MG: 5 INJECTION, SOLUTION INTRAVENOUS at 00:29

## 2024-11-20 RX ADMIN — MORPHINE SULFATE 4 MG: 4 INJECTION, SOLUTION INTRAMUSCULAR; INTRAVENOUS at 02:12

## 2024-11-20 RX ADMIN — IOPAMIDOL 75 ML: 755 INJECTION, SOLUTION INTRAVENOUS at 04:19

## 2024-11-20 RX ADMIN — SODIUM CHLORIDE 5 MG/HR: 900 INJECTION, SOLUTION INTRAVENOUS at 02:34

## 2024-11-20 RX ADMIN — SODIUM CHLORIDE 7.5 MG/HR: 900 INJECTION, SOLUTION INTRAVENOUS at 03:21

## 2024-11-20 RX ADMIN — CEFTRIAXONE SODIUM 1000 MG: 1 INJECTION, POWDER, FOR SOLUTION INTRAMUSCULAR; INTRAVENOUS at 06:55

## 2024-11-20 RX ADMIN — AZITHROMYCIN MONOHYDRATE 500 MG: 500 INJECTION, POWDER, LYOPHILIZED, FOR SOLUTION INTRAVENOUS at 07:09

## 2024-11-20 ASSESSMENT — PAIN - FUNCTIONAL ASSESSMENT
PAIN_FUNCTIONAL_ASSESSMENT: NONE - DENIES PAIN
PAIN_FUNCTIONAL_ASSESSMENT: NONE - DENIES PAIN
PAIN_FUNCTIONAL_ASSESSMENT: 0-10

## 2024-11-20 ASSESSMENT — PAIN SCALES - GENERAL
PAINLEVEL_OUTOF10: 9
PAINLEVEL_OUTOF10: 9
PAINLEVEL_OUTOF10: 8
PAINLEVEL_OUTOF10: 9

## 2024-11-20 ASSESSMENT — PAIN DESCRIPTION - LOCATION
LOCATION: CHEST;ABDOMEN
LOCATION: CHEST
LOCATION: ABDOMEN;CHEST;BACK
LOCATION: CHEST;ABDOMEN

## 2024-11-20 NOTE — ED NOTES
Pt signed emtala as well as [facility]ns[facility]. Formed placed in pt rounder. [facility] on [facility]

## 2024-11-20 NOTE — ED PROVIDER NOTES
reviewed; specifically outside hospital records reviewed.  Patient had a CT of the brain on November 1 that showed metastatic lesion in the brain with hemorrhagic component.  -Laboratory results reviewed.  Patient has leukocytosis.  Normal lactic acid level.  Troponin 33 likely a demand troponin leak.  No acute renal failure.  No clinically significant electrolyte derangement.  -Chest x-ray show possible enlarging mass versus infection.  CT PE study showed a subsegmental PE as well as mucous plugging in the right upper lobe concerning for infectious etiology.  Antibiotic initiated.  For the PE, patient had no metastases with hemorrhagic component in the brain.  He therefore cannot get anticoagulation.  Since his PE is in the subsegmental level without evidence of right heart strain, we would not intervene at this time.  If this clot gets bigger, patient may be a candidate for thrombectomy.    - Patient was given    ED Medication Orders (From admission, onward)      Start Ordered     Status Ordering Provider    11/20/24 0200 11/20/24 0149  morphine sulfate (PF) injection 4 mg  ONCE         Last MAR action: Given - by POPEYE PAVON on 11/20/24 at 0212 SARAH SOMMER    11/20/24 0030 11/20/24 0025  dilTIAZem injection 20 mg  ONCE        Placed in \"Followed by\" Linked Group    Last MAR action: Given - by POPEYE PAVON on 11/20/24 at 0029 SARAH SOMMER    11/20/24 0030 11/20/24 0027  dilTIAZem 100 mg in sodium chloride 0.9 % 100 mL infusion (ADD-[place])  CONTINUOUS        Question Answer Comment   Titrate Infusion? Yes    Initial Infusion Dose: 5 mg/hr    Goal of Therapy is: HR less than 100 bpm    Contact Provider if: SBP less than 90 mmHg    Contact Provider if: HR less than 60 bpm    HOLD for SBP less than 90 mmHg    HOLD for HR less than 60 bpm        Last MAR action: New Bag - by POPEYE PAVON on 11/20/24 at 0234 SARAH SOMMER            - I discussed the results with patient.  We agreed to transfer

## 2024-11-20 NOTE — ED NOTES
[facility], discussed pt finding and appointments scheduled for the day.. transfer ETA is 10;30

## 2024-11-24 LAB
BACTERIA BLD CULT ORG #2: NORMAL
BACTERIA BLD CULT: NORMAL

## 2024-12-16 ENCOUNTER — HOSPITAL ENCOUNTER (OUTPATIENT)
Dept: CT IMAGING | Age: 64
Discharge: HOME | End: 2024-12-16
Attending: INTERNAL MEDICINE
Payer: MEDICARE

## 2024-12-16 DIAGNOSIS — C34.12 SQUAMOUS CELL CARCINOMA OF BRONCHUS IN LEFT UPPER LOBE (HCC): ICD-10-CM

## 2024-12-16 LAB
PERFORMED ON: NORMAL
POC CREATININE: 0.9 MG/DL (ref 0.8–1.3)
POC SAMPLE TYPE: NORMAL

## 2024-12-16 PROCEDURE — 6360000004 HC RX CONTRAST MEDICATION: Performed by: INTERNAL MEDICINE

## 2024-12-16 PROCEDURE — 82565 ASSAY OF CREATININE: CPT

## 2024-12-16 PROCEDURE — 71260 CT THORAX DX C+: CPT

## 2024-12-16 RX ORDER — DIATRIZOATE MEGLUMINE AND DIATRIZOATE SODIUM 660; 100 MG/ML; MG/ML
12 SOLUTION ORAL; RECTAL
Status: COMPLETED | OUTPATIENT
Start: 2024-12-16 | End: 2024-12-16

## 2024-12-16 RX ORDER — IOPAMIDOL 755 MG/ML
75 INJECTION, SOLUTION INTRAVASCULAR
Status: COMPLETED | OUTPATIENT
Start: 2024-12-16 | End: 2024-12-16

## 2024-12-16 RX ADMIN — IOPAMIDOL 75 ML: 755 INJECTION, SOLUTION INTRAVENOUS at 15:13

## 2024-12-16 RX ADMIN — DIATRIZOATE MEGLUMINE AND DIATRIZOATE SODIUM 12 ML: 660; 100 LIQUID ORAL; RECTAL at 15:13

## 2024-12-17 ENCOUNTER — HOSPITAL ENCOUNTER (OUTPATIENT)
Dept: MRI IMAGING | Age: 64
Discharge: HOME | End: 2024-12-17
Attending: INTERNAL MEDICINE

## 2024-12-17 DIAGNOSIS — C34.12 SQUAMOUS CELL CARCINOMA OF BRONCHUS IN LEFT UPPER LOBE (HCC): ICD-10-CM

## 2024-12-23 ENCOUNTER — APPOINTMENT (OUTPATIENT)
Dept: CT IMAGING | Age: 64
End: 2024-12-23
Attending: INTERNAL MEDICINE
Payer: OTHER GOVERNMENT

## 2024-12-23 ENCOUNTER — HOSPITAL ENCOUNTER (OUTPATIENT)
Age: 64
Setting detail: OBSERVATION
Discharge: HOME | End: 2024-12-25
Attending: INTERNAL MEDICINE | Admitting: INTERNAL MEDICINE
Payer: OTHER GOVERNMENT

## 2024-12-23 PROBLEM — C34.90 SMALL CELL LUNG CANCER IN ADULT (HCC): Status: ACTIVE | Noted: 2024-12-23

## 2024-12-23 LAB
GLUCOSE BLD-MCNC: 229 MG/DL (ref 70–99)
GLUCOSE BLD-MCNC: 281 MG/DL (ref 70–99)
PERFORMED ON: ABNORMAL
PERFORMED ON: ABNORMAL
TROPONIN, HIGH SENSITIVITY: 17 NG/L (ref 0–22)

## 2024-12-23 PROCEDURE — 93005 ELECTROCARDIOGRAM TRACING: CPT | Performed by: INTERNAL MEDICINE

## 2024-12-23 PROCEDURE — 2500000003 HC RX 250 WO HCPCS: Performed by: NURSE PRACTITIONER

## 2024-12-23 PROCEDURE — 6370000000 HC RX 637 (ALT 250 FOR IP): Performed by: NURSE PRACTITIONER

## 2024-12-23 PROCEDURE — 2580000003 HC RX 258: Performed by: NURSE PRACTITIONER

## 2024-12-23 PROCEDURE — G0378 HOSPITAL OBSERVATION PER HR: HCPCS

## 2024-12-23 PROCEDURE — 6360000002 HC RX W HCPCS: Performed by: NURSE PRACTITIONER

## 2024-12-23 PROCEDURE — G0379 DIRECT REFER HOSPITAL OBSERV: HCPCS

## 2024-12-23 PROCEDURE — 6370000000 HC RX 637 (ALT 250 FOR IP): Performed by: INTERNAL MEDICINE

## 2024-12-23 PROCEDURE — 84484 ASSAY OF TROPONIN QUANT: CPT

## 2024-12-23 PROCEDURE — 94761 N-INVAS EAR/PLS OXIMETRY MLT: CPT

## 2024-12-23 PROCEDURE — 70450 CT HEAD/BRAIN W/O DYE: CPT

## 2024-12-23 PROCEDURE — 96372 THER/PROPH/DIAG INJ SC/IM: CPT

## 2024-12-23 PROCEDURE — 94660 CPAP INITIATION&MGMT: CPT

## 2024-12-23 RX ORDER — PROPRANOLOL HCL 20 MG
60 TABLET ORAL DAILY
Status: DISCONTINUED | OUTPATIENT
Start: 2024-12-23 | End: 2024-12-23

## 2024-12-23 RX ORDER — GLUCAGON 1 MG/ML
1 KIT INJECTION PRN
Status: DISCONTINUED | OUTPATIENT
Start: 2024-12-23 | End: 2024-12-25 | Stop reason: HOSPADM

## 2024-12-23 RX ORDER — INSULIN GLARGINE 100 [IU]/ML
16 INJECTION, SOLUTION SUBCUTANEOUS DAILY
Status: DISCONTINUED | OUTPATIENT
Start: 2024-12-23 | End: 2024-12-23

## 2024-12-23 RX ORDER — INSULIN LISPRO 100 [IU]/ML
0-4 INJECTION, SOLUTION INTRAVENOUS; SUBCUTANEOUS
Status: DISCONTINUED | OUTPATIENT
Start: 2024-12-23 | End: 2024-12-25 | Stop reason: HOSPADM

## 2024-12-23 RX ORDER — OXYCODONE HYDROCHLORIDE 5 MG/1
10 TABLET ORAL EVERY 6 HOURS PRN
Status: DISCONTINUED | OUTPATIENT
Start: 2024-12-23 | End: 2024-12-25 | Stop reason: HOSPADM

## 2024-12-23 RX ORDER — SODIUM CHLORIDE 9 MG/ML
INJECTION, SOLUTION INTRAVENOUS CONTINUOUS
Status: ACTIVE | OUTPATIENT
Start: 2024-12-23 | End: 2024-12-23

## 2024-12-23 RX ORDER — MORPHINE SULFATE 15 MG/1
15 TABLET, FILM COATED, EXTENDED RELEASE ORAL 2 TIMES DAILY
Status: ON HOLD | COMMUNITY
End: 2024-12-25 | Stop reason: HOSPADM

## 2024-12-23 RX ORDER — MORPHINE SULFATE 15 MG/1
15 TABLET, FILM COATED, EXTENDED RELEASE ORAL EVERY 12 HOURS SCHEDULED
Status: DISCONTINUED | OUTPATIENT
Start: 2024-12-23 | End: 2024-12-25 | Stop reason: HOSPADM

## 2024-12-23 RX ORDER — ACETAMINOPHEN 325 MG/1
650 TABLET ORAL EVERY 6 HOURS PRN
Status: DISCONTINUED | OUTPATIENT
Start: 2024-12-23 | End: 2024-12-25 | Stop reason: HOSPADM

## 2024-12-23 RX ORDER — ALPRAZOLAM 0.25 MG/1
0.25 TABLET ORAL 2 TIMES DAILY PRN
Status: DISCONTINUED | OUTPATIENT
Start: 2024-12-23 | End: 2024-12-25 | Stop reason: HOSPADM

## 2024-12-23 RX ORDER — OXYCODONE HYDROCHLORIDE 5 MG/1
10 TABLET ORAL EVERY 6 HOURS PRN
COMMUNITY

## 2024-12-23 RX ORDER — BENZONATATE 100 MG/1
100 CAPSULE ORAL 3 TIMES DAILY PRN
COMMUNITY

## 2024-12-23 RX ORDER — ALBUTEROL SULFATE 0.83 MG/ML
2.5 SOLUTION RESPIRATORY (INHALATION) EVERY 4 HOURS PRN
Status: DISCONTINUED | OUTPATIENT
Start: 2024-12-23 | End: 2024-12-25 | Stop reason: HOSPADM

## 2024-12-23 RX ORDER — ROPINIROLE 2 MG/1
2 TABLET, FILM COATED ORAL NIGHTLY PRN
Status: ON HOLD | COMMUNITY
End: 2024-12-25 | Stop reason: HOSPADM

## 2024-12-23 RX ORDER — ACETAMINOPHEN 650 MG/1
650 SUPPOSITORY RECTAL EVERY 6 HOURS PRN
Status: DISCONTINUED | OUTPATIENT
Start: 2024-12-23 | End: 2024-12-25 | Stop reason: HOSPADM

## 2024-12-23 RX ORDER — SODIUM CHLORIDE 0.9 % (FLUSH) 0.9 %
5-40 SYRINGE (ML) INJECTION PRN
Status: DISCONTINUED | OUTPATIENT
Start: 2024-12-23 | End: 2024-12-25 | Stop reason: HOSPADM

## 2024-12-23 RX ORDER — LOSARTAN POTASSIUM 25 MG/1
25 TABLET ORAL DAILY
Status: DISCONTINUED | OUTPATIENT
Start: 2024-12-23 | End: 2024-12-23

## 2024-12-23 RX ORDER — ATORVASTATIN CALCIUM 20 MG/1
20 TABLET, FILM COATED ORAL DAILY
Status: DISCONTINUED | OUTPATIENT
Start: 2024-12-23 | End: 2024-12-23

## 2024-12-23 RX ORDER — ENOXAPARIN SODIUM 100 MG/ML
40 INJECTION SUBCUTANEOUS DAILY
Status: DISCONTINUED | OUTPATIENT
Start: 2024-12-23 | End: 2024-12-23

## 2024-12-23 RX ORDER — PROPRANOLOL HYDROCHLORIDE 40 MG/1
60 TABLET ORAL 2 TIMES DAILY
Status: ON HOLD | COMMUNITY
End: 2024-12-25 | Stop reason: HOSPADM

## 2024-12-23 RX ORDER — DOCUSATE SODIUM 100 MG/1
200 CAPSULE, LIQUID FILLED ORAL NIGHTLY
Status: DISCONTINUED | OUTPATIENT
Start: 2024-12-23 | End: 2024-12-25 | Stop reason: HOSPADM

## 2024-12-23 RX ORDER — INSULIN GLARGINE 100 [IU]/ML
16 INJECTION, SOLUTION SUBCUTANEOUS NIGHTLY
Status: DISCONTINUED | OUTPATIENT
Start: 2024-12-23 | End: 2024-12-25 | Stop reason: HOSPADM

## 2024-12-23 RX ORDER — POLYETHYLENE GLYCOL 3350 17 G/17G
17 POWDER, FOR SOLUTION ORAL DAILY PRN
Status: DISCONTINUED | OUTPATIENT
Start: 2024-12-23 | End: 2024-12-25 | Stop reason: HOSPADM

## 2024-12-23 RX ORDER — DILTIAZEM HYDROCHLORIDE 240 MG/1
240 CAPSULE, COATED, EXTENDED RELEASE ORAL DAILY
Status: DISCONTINUED | OUTPATIENT
Start: 2024-12-24 | End: 2024-12-25 | Stop reason: HOSPADM

## 2024-12-23 RX ORDER — ATORVASTATIN CALCIUM 20 MG/1
20 TABLET, FILM COATED ORAL NIGHTLY
COMMUNITY

## 2024-12-23 RX ORDER — SODIUM CHLORIDE 0.9 % (FLUSH) 0.9 %
5-40 SYRINGE (ML) INJECTION EVERY 12 HOURS SCHEDULED
Status: DISCONTINUED | OUTPATIENT
Start: 2024-12-23 | End: 2024-12-25 | Stop reason: HOSPADM

## 2024-12-23 RX ORDER — DEXTROSE MONOHYDRATE 100 MG/ML
INJECTION, SOLUTION INTRAVENOUS CONTINUOUS PRN
Status: DISCONTINUED | OUTPATIENT
Start: 2024-12-23 | End: 2024-12-25 | Stop reason: HOSPADM

## 2024-12-23 RX ORDER — DULOXETIN HYDROCHLORIDE 30 MG/1
30 CAPSULE, DELAYED RELEASE ORAL DAILY
COMMUNITY

## 2024-12-23 RX ORDER — AMANTADINE HYDROCHLORIDE 100 MG/1
100 CAPSULE, GELATIN COATED ORAL 2 TIMES DAILY
Status: DISCONTINUED | OUTPATIENT
Start: 2024-12-23 | End: 2024-12-23

## 2024-12-23 RX ORDER — DULOXETIN HYDROCHLORIDE 30 MG/1
30 CAPSULE, DELAYED RELEASE ORAL DAILY
Status: DISCONTINUED | OUTPATIENT
Start: 2024-12-24 | End: 2024-12-25 | Stop reason: HOSPADM

## 2024-12-23 RX ORDER — INSULIN LISPRO 100 [IU]/ML
1 INJECTION, SOLUTION INTRAVENOUS; SUBCUTANEOUS
Status: DISCONTINUED | OUTPATIENT
Start: 2024-12-23 | End: 2024-12-23

## 2024-12-23 RX ORDER — BENZONATATE 100 MG/1
100 CAPSULE ORAL 3 TIMES DAILY PRN
Status: DISCONTINUED | OUTPATIENT
Start: 2024-12-23 | End: 2024-12-25 | Stop reason: HOSPADM

## 2024-12-23 RX ORDER — FLUTICASONE PROPIONATE 50 MCG
1 SPRAY, SUSPENSION (ML) NASAL DAILY PRN
COMMUNITY

## 2024-12-23 RX ORDER — VITAMIN B COMPLEX
1000 TABLET ORAL DAILY
Status: DISCONTINUED | OUTPATIENT
Start: 2024-12-24 | End: 2024-12-25 | Stop reason: HOSPADM

## 2024-12-23 RX ORDER — NITROGLYCERIN 0.4 MG/1
0.4 TABLET SUBLINGUAL EVERY 5 MIN PRN
Status: DISCONTINUED | OUTPATIENT
Start: 2024-12-23 | End: 2024-12-25 | Stop reason: HOSPADM

## 2024-12-23 RX ORDER — SODIUM CHLORIDE 9 MG/ML
INJECTION, SOLUTION INTRAVENOUS PRN
Status: DISCONTINUED | OUTPATIENT
Start: 2024-12-23 | End: 2024-12-25 | Stop reason: HOSPADM

## 2024-12-23 RX ORDER — HYDROCODONE BITARTRATE AND ACETAMINOPHEN 5; 325 MG/1; MG/1
1 TABLET ORAL EVERY 6 HOURS PRN
Status: DISCONTINUED | OUTPATIENT
Start: 2024-12-23 | End: 2024-12-23

## 2024-12-23 RX ORDER — LORATADINE 10 MG/1
10 TABLET ORAL DAILY
COMMUNITY

## 2024-12-23 RX ORDER — FLUTICASONE PROPIONATE 50 MCG
2 SPRAY, SUSPENSION (ML) NASAL DAILY PRN
Status: DISCONTINUED | OUTPATIENT
Start: 2024-12-23 | End: 2024-12-25 | Stop reason: HOSPADM

## 2024-12-23 RX ORDER — DILTIAZEM HYDROCHLORIDE 240 MG/1
240 CAPSULE, EXTENDED RELEASE ORAL DAILY
Status: ON HOLD | COMMUNITY
End: 2024-12-25 | Stop reason: HOSPADM

## 2024-12-23 RX ADMIN — SODIUM CHLORIDE, PRESERVATIVE FREE 10 ML: 5 INJECTION INTRAVENOUS at 20:35

## 2024-12-23 RX ADMIN — INSULIN LISPRO 2 UNITS: 100 INJECTION, SOLUTION INTRAVENOUS; SUBCUTANEOUS at 20:40

## 2024-12-23 RX ADMIN — SODIUM CHLORIDE: 9 INJECTION, SOLUTION INTRAVENOUS at 14:12

## 2024-12-23 RX ADMIN — OXYCODONE 10 MG: 5 TABLET ORAL at 21:55

## 2024-12-23 RX ADMIN — NITROGLYCERIN 0.4 MG: 0.4 TABLET, ORALLY DISINTEGRATING SUBLINGUAL at 22:15

## 2024-12-23 RX ADMIN — ENOXAPARIN SODIUM 40 MG: 100 INJECTION SUBCUTANEOUS at 14:13

## 2024-12-23 RX ADMIN — INSULIN GLARGINE 16 UNITS: 100 INJECTION, SOLUTION SUBCUTANEOUS at 20:40

## 2024-12-23 RX ADMIN — MORPHINE SULFATE 15 MG: 15 TABLET, FILM COATED, EXTENDED RELEASE ORAL at 20:34

## 2024-12-23 RX ADMIN — OXYCODONE 10 MG: 5 TABLET ORAL at 15:07

## 2024-12-23 RX ADMIN — INSULIN LISPRO 1 UNITS: 100 INJECTION, SOLUTION INTRAVENOUS; SUBCUTANEOUS at 16:59

## 2024-12-23 RX ADMIN — NITROGLYCERIN 0.4 MG: 0.4 TABLET, ORALLY DISINTEGRATING SUBLINGUAL at 22:29

## 2024-12-23 ASSESSMENT — PAIN - FUNCTIONAL ASSESSMENT
PAIN_FUNCTIONAL_ASSESSMENT: PREVENTS OR INTERFERES SOME ACTIVE ACTIVITIES AND ADLS
PAIN_FUNCTIONAL_ASSESSMENT: PREVENTS OR INTERFERES WITH ALL ACTIVE AND SOME PASSIVE ACTIVITIES

## 2024-12-23 ASSESSMENT — PAIN DESCRIPTION - PAIN TYPE
TYPE: CHRONIC PAIN
TYPE: ACUTE PAIN
TYPE: CHRONIC PAIN

## 2024-12-23 ASSESSMENT — PAIN DESCRIPTION - ONSET
ONSET: ON-GOING
ONSET: SUDDEN

## 2024-12-23 ASSESSMENT — PAIN SCALES - GENERAL
PAINLEVEL_OUTOF10: 7
PAINLEVEL_OUTOF10: 8
PAINLEVEL_OUTOF10: 9
PAINLEVEL_OUTOF10: 8

## 2024-12-23 ASSESSMENT — PAIN DESCRIPTION - ORIENTATION
ORIENTATION: OTHER (COMMENT)
ORIENTATION: MID

## 2024-12-23 ASSESSMENT — PAIN DESCRIPTION - FREQUENCY
FREQUENCY: CONTINUOUS
FREQUENCY: INTERMITTENT

## 2024-12-23 ASSESSMENT — ENCOUNTER SYMPTOMS
VOICE CHANGE: 1
SHORTNESS OF BREATH: 1

## 2024-12-23 ASSESSMENT — PAIN DESCRIPTION - LOCATION
LOCATION: CHEST;HEAD
LOCATION: CHEST;HEAD
LOCATION: HEAD;CHEST
LOCATION: CHEST
LOCATION: CHEST;HEAD

## 2024-12-23 ASSESSMENT — PAIN DESCRIPTION - DESCRIPTORS
DESCRIPTORS: THROBBING
DESCRIPTORS: THROBBING
DESCRIPTORS: THROBBING;CRUSHING
DESCRIPTORS: THROBBING
DESCRIPTORS: THROBBING

## 2024-12-23 NOTE — H&P
ureters  are normal caliber.    GI/Bowel: There is oral contrast in the stomach and small bowel which is  normal caliber.  There is no bowel wall thickening.  There is mild feces  scattered in the colon which is normal caliber.  The mesentery is  unremarkable.  The appendix is not well visualized with no pericecal  inflammation    Pelvis:   The bladder is unremarkable.  The prostate gland is borderline  enlarged.  No adenopathy or ascites is seen.  There are some diverticula  along the sigmoid colon with no pericolonic inflammation.  There is a 1.3 cm  umbilical hernia which is unchanged with no bowel loops in the hernia.    Peritoneum/Retroperitoneum:   The aorta is normal caliber with no aneurysm or  dissection and no retroperitoneal mass or adenopathy is seen.    Bones/Soft Tissues:   The bones are intact.  No aggressive osseous lesion is  seen.  Impression: Slowly resolving pulmonary embolus along the left lower lobe.    Left suprahilar mass which has slightly increased in size and probably  represents the patient's known bronchogenic neoplasm.  The mass is causing  moderate narrowing of the left main pulmonary artery with moderate narrowing  of the left upper lobe bronchus which is more apparent.    Resolving reticulonodular infiltrates left upper lobe and slowly resolving  ground-glass opacities along the lung bases anteriorly.    Mild chronic liver changes with fatty replacement throughout which is  unchanged    No evidence of metastatic disease in the abdomen and pelvis.    Mild constipation with no obstruction.    Mild prostatic enlargement with no pelvic mass or active inflammation.    Scattered diverticula along the sigmoid colon with no pericolonic  inflammation.    Small hiatal umbilical hernia which is unchanged      Problem List  There is no problem list on file for this patient.      IMPRESSION/RECOMMENDATIONS:    Extensive small cell lung carcinoma:  -Initial diagnosis July 2024  -T4, N2, M1

## 2024-12-23 NOTE — CARE COORDINATION
Case Management Assessment  Initial Evaluation    Date/Time of Evaluation: 12/23/2024 1:56 PM  Assessment Completed by: BRENDA Mclean   for [facility] [place] ([facility])  [employer]: [phone]    If patient is discharged prior to next notation, then this note serves as note for discharge by case management.    Patient Name: Ricardo Campbell                   YOB: 1960  Diagnosis: Small cell lung cancer in adult (HCC) [C34.90]                   Date / Time: 12/23/2024 12:35 PM    Patient Admission Status: Observation   Readmission Risk (Low < 19, Mod (19-27), High > 27): No data recorded  Current PCP: No primary care provider on file.  PCP verified by CM? Yes    Chart Reviewed: Yes      History Provided by: Patient  Patient Orientation: Alert and Oriented    Patient Cognition: Alert    Hospitalization in the last 30 days (Readmission):  No    If yes, Readmission Assessment in CM Navigator will be completed.    Advance Directives:      Code Status: Full Code   Patient's Primary Decision Maker is:  (pt's daughter said his wife maria r is POA for healthcare. Requested copies and they stated understanding)      Discharge Planning:    Patient lives with: Spouse/Significant Other Type of Home: House  Primary Care Giver: Self  Patient Support Systems include: Spouse/Significant Other, Children   Current Financial resources: Medicare, Other (Comment) ([facility] [employer] secondary on the facesheet)  Current community resources: None  Current services prior to admission: Durable Medical Equipment, Other (Comment) ([facility], Palliative Care through [facility] and [facility] 9 hrs a week [facility])            Current DME: Walker, Cane, Other (Comment) (Electric scooter)            Type of Home Care services:  None    ADLS  Prior functional level: Independent in ADLs/IADLs  Current functional level: Independent in ADLs/IADLs    PT AM-PAC:   /24  OT AM-PAC:

## 2024-12-23 NOTE — PROGRESS NOTES
Patient admitted to [facility] via motorized wheelchair from [facility] for diagnosis of small cell lung cancer. He is here for observation following [facility].     Patient is alert and oriented to room including call light and bed controls.  Admission assessment completed - see admission flowsheet documentation. Patient is a high fall risk. Safety measures instituted per policy.     Patient oriented to unit policies and procedures including: pain management practices, unit safety precautions, family rapid response, q4h vital signs and assessments, daily 4am lab draws, weekly chest x-rays, daily chlorhexidine bathing, standing transfusion orders, and routine central line care.  Also discussed use of call light and how to get in touch with nursing staff.  Stressed the importance of calling out immediately for any changes in condition including but not limited to: pain, chills, fever, nausea, vomiting, diarrhea, chest pain, sob/vicente, assistance with toileting, bleeding, or any other symptoms that are out of the ordinary for the patient.  Patient verbalizes understanding of all instructions and will call for assistance as needed.

## 2024-12-23 NOTE — PLAN OF CARE
Problem: Pain  Goal: Verbalizes/displays adequate comfort level or baseline comfort level  Outcome: Progressing  Note: Pt reported pain this shift. PRN oxycodone given per MAR. Pt educated on importance of calling for pain meds when in pain. Pt verbalized understanding.      Problem: Safety - Adult  Goal: Free from fall injury  Outcome: Progressing  Note: Pt meets criteria for orthostasis.  Pt is a High fall risk. See Katz Fall Score and ABCDS Injury Risk assessments.   + Screening for Orthostasis and/or + High Fall Risk per KATZ/ABCDS: Explained fall risk precautions to pt and family and rationale behind their use to keep the patient safe. Pt bed is in low position, side rails up, call light and belongings are in reach. Fall wristband applied and present on pts wrist.  Bed alarm on.  Pt encouraged to call for assistance. Will continue with hourly rounds for PO intake, pain needs, toileting and repositioning as needed.      Problem: ABCDS Injury Assessment  Goal: Absence of physical injury  Outcome: Progressing  Note: No new reported or observed physical injuries this shift.

## 2024-12-23 NOTE — PROGRESS NOTES
Patient reporting new onset dizziness (as of yesterday) that accompanies a feeling of \"not quite being there, not being able to focus.\" Paitent has headaches (not new). RN notified Dr. Suero who ordered CT head. RN placed order for CT head with/without contrast. Order was changed to CT head without contrast. RN notified Dr. Suero of impressions. No new orders at this time.

## 2024-12-23 NOTE — DISCHARGE SUMMARY
DISCHARGE SUMMARY NOTE    Patient ID: Ricardo Campbell 64 y.o. 1960     Admission Date: 12/23/2024     Discharge Date:  12/25/24    Admission Diagnoses: Small cell lung cancer in adult (HCC) [C34.90]    Discharge Diagnoses:   Patient Active Problem List   Diagnosis    Small cell lung cancer in adult (HCC)            Consultations: cardiiology    Allergies: Bee venom and Chloraseptic [phenol]    Discharge Medications:    sodium chloride flush  5-40 mL IntraVENous 2 times per day    [START ON 12/24/2024] Vitamin D  1,000 Units Oral Daily    [START ON 12/24/2024] dilTIAZem  240 mg Oral Daily    [START ON 12/24/2024] DULoxetine  30 mg Oral Daily    docusate sodium  200 mg Oral Nightly    morphine  15 mg Oral 2 times per day    insulin glargine  16 Units SubCUTAneous Nightly    insulin lispro  0-4 Units SubCUTAneous 4x Daily AC & HS       Vital Signs: /74   Pulse 60   Temp 97.5 °F (36.4 °C) (Oral)   Resp 20   Ht 1.829 m (6') Comment: stated height; too lightheaded to walk in woods  Wt 112.3 kg (247 lb 9.2 oz)   SpO2 98%   BMI 33.58 kg/m²   CONSTITUTIONAL:  Awake, alert & oriented x3  HEENT: PERRL, Neck: soft, supple, no cervical, supraclavicular or axillary adenopathy  RESPIRATORY:  No increased work of breathing, breath sounds decreased.  CARDIOVASCULAR:  Cardiac S1/S2, RRR  GASTROINTESTINAL:  abdomen soft +BS x4, no hepatosplenomegaly  SKIN:  negative for rash and skin lesion(s)  MUSCULOSKELETAL:  negative for pain and muscle weakness  EXTREMITIES: Negative for Lower extremity edema    Brief Summary of Patient's Course:      He has a history of extensive stage small cell lung carcinoma.  He was originally diagnosed in July 2024 and had a brain metastasis successfully treated with radiation at that time.  He was then treated with carboplatinum/-16 but was not given immunotherapy.  He did relatively well, but has had a quick relapse.  He was admitted for cycle #1, day #1 Imdeltra.   Patient

## 2024-12-24 LAB
ALBUMIN SERPL-MCNC: 3.8 G/DL (ref 3.4–5)
ALBUMIN/GLOB SERPL: 1.4 {RATIO} (ref 1.1–2.2)
ALP SERPL-CCNC: 84 U/L (ref 40–129)
ALT SERPL-CCNC: 45 U/L (ref 10–40)
ANION GAP SERPL CALCULATED.3IONS-SCNC: 13 MMOL/L (ref 3–16)
AST SERPL-CCNC: 20 U/L (ref 15–37)
BASOPHILS # BLD: 0.1 K/UL (ref 0–0.2)
BASOPHILS NFR BLD: 0.6 %
BILIRUB SERPL-MCNC: 0.3 MG/DL (ref 0–1)
BUN SERPL-MCNC: 8 MG/DL (ref 7–20)
CALCIUM SERPL-MCNC: 9.1 MG/DL (ref 8.3–10.6)
CHLORIDE SERPL-SCNC: 100 MMOL/L (ref 99–110)
CO2 SERPL-SCNC: 24 MMOL/L (ref 21–32)
CREAT SERPL-MCNC: 0.8 MG/DL (ref 0.8–1.3)
CRP SERPL-MCNC: 12.1 MG/L (ref 0–5.1)
DEPRECATED RDW RBC AUTO: 13.5 % (ref 12.4–15.4)
EKG ATRIAL RATE: 112 BPM
EKG ATRIAL RATE: 68 BPM
EKG DIAGNOSIS: NORMAL
EKG DIAGNOSIS: NORMAL
EKG P AXIS: 82 DEGREES
EKG P-R INTERVAL: 144 MS
EKG Q-T INTERVAL: 302 MS
EKG Q-T INTERVAL: 338 MS
EKG QRS DURATION: 74 MS
EKG QRS DURATION: 76 MS
EKG QTC CALCULATION (BAZETT): 412 MS
EKG QTC CALCULATION (BAZETT): 451 MS
EKG R AXIS: 51 DEGREES
EKG R AXIS: 59 DEGREES
EKG T AXIS: 80 DEGREES
EKG T AXIS: 83 DEGREES
EKG VENTRICULAR RATE: 107 BPM
EKG VENTRICULAR RATE: 112 BPM
EOSINOPHIL # BLD: 0 K/UL (ref 0–0.6)
EOSINOPHIL NFR BLD: 0 %
FERRITIN SERPL IA-MCNC: 438 NG/ML (ref 30–400)
GFR SERPLBLD CREATININE-BSD FMLA CKD-EPI: >90 ML/MIN/{1.73_M2}
GLUCOSE BLD-MCNC: 223 MG/DL (ref 70–99)
GLUCOSE BLD-MCNC: 246 MG/DL (ref 70–99)
GLUCOSE BLD-MCNC: 302 MG/DL (ref 70–99)
GLUCOSE BLD-MCNC: 345 MG/DL (ref 70–99)
GLUCOSE SERPL-MCNC: 242 MG/DL (ref 70–99)
HCT VFR BLD AUTO: 40.3 % (ref 40.5–52.5)
HGB BLD-MCNC: 13.6 G/DL (ref 13.5–17.5)
LYMPHOCYTES # BLD: 1.1 K/UL (ref 1–5.1)
LYMPHOCYTES NFR BLD: 7.7 %
MCH RBC QN AUTO: 33.4 PG (ref 26–34)
MCHC RBC AUTO-ENTMCNC: 33.7 G/DL (ref 31–36)
MCV RBC AUTO: 99.1 FL (ref 80–100)
MONOCYTES # BLD: 0.5 K/UL (ref 0–1.3)
MONOCYTES NFR BLD: 3.1 %
NEUTROPHILS # BLD: 12.9 K/UL (ref 1.7–7.7)
NEUTROPHILS NFR BLD: 88.6 %
PERFORMED ON: ABNORMAL
PLATELET # BLD AUTO: 226 K/UL (ref 135–450)
PMV BLD AUTO: 7.5 FL (ref 5–10.5)
POTASSIUM SERPL-SCNC: 4 MMOL/L (ref 3.5–5.1)
PROT SERPL-MCNC: 6.6 G/DL (ref 6.4–8.2)
RBC # BLD AUTO: 4.06 M/UL (ref 4.2–5.9)
SODIUM SERPL-SCNC: 137 MMOL/L (ref 136–145)
TROPONIN, HIGH SENSITIVITY: 22 NG/L (ref 0–22)
WBC # BLD AUTO: 14.6 K/UL (ref 4–11)

## 2024-12-24 PROCEDURE — 93005 ELECTROCARDIOGRAM TRACING: CPT | Performed by: INTERNAL MEDICINE

## 2024-12-24 PROCEDURE — 99255 IP/OBS CONSLTJ NEW/EST HI 80: CPT | Performed by: INTERNAL MEDICINE

## 2024-12-24 PROCEDURE — 6360000002 HC RX W HCPCS: Performed by: INTERNAL MEDICINE

## 2024-12-24 PROCEDURE — 93010 ELECTROCARDIOGRAM REPORT: CPT | Performed by: INTERNAL MEDICINE

## 2024-12-24 PROCEDURE — 82728 ASSAY OF FERRITIN: CPT

## 2024-12-24 PROCEDURE — 2500000003 HC RX 250 WO HCPCS: Performed by: INTERNAL MEDICINE

## 2024-12-24 PROCEDURE — 2580000003 HC RX 258: Performed by: INTERNAL MEDICINE

## 2024-12-24 PROCEDURE — 36591 DRAW BLOOD OFF VENOUS DEVICE: CPT

## 2024-12-24 PROCEDURE — 86140 C-REACTIVE PROTEIN: CPT

## 2024-12-24 PROCEDURE — 6370000000 HC RX 637 (ALT 250 FOR IP): Performed by: NURSE PRACTITIONER

## 2024-12-24 PROCEDURE — 2500000003 HC RX 250 WO HCPCS: Performed by: NURSE PRACTITIONER

## 2024-12-24 PROCEDURE — 2700000000 HC OXYGEN THERAPY PER DAY

## 2024-12-24 PROCEDURE — 96365 THER/PROPH/DIAG IV INF INIT: CPT

## 2024-12-24 PROCEDURE — 85025 COMPLETE CBC W/AUTO DIFF WBC: CPT

## 2024-12-24 PROCEDURE — 84484 ASSAY OF TROPONIN QUANT: CPT

## 2024-12-24 PROCEDURE — 96361 HYDRATE IV INFUSION ADD-ON: CPT

## 2024-12-24 PROCEDURE — 6370000000 HC RX 637 (ALT 250 FOR IP): Performed by: INTERNAL MEDICINE

## 2024-12-24 PROCEDURE — 96366 THER/PROPH/DIAG IV INF ADDON: CPT

## 2024-12-24 PROCEDURE — G0378 HOSPITAL OBSERVATION PER HR: HCPCS

## 2024-12-24 PROCEDURE — 80053 COMPREHEN METABOLIC PANEL: CPT

## 2024-12-24 RX ORDER — DEXAMETHASONE 4 MG/1
10 TABLET ORAL DAILY
Status: COMPLETED | OUTPATIENT
Start: 2024-12-24 | End: 2024-12-25

## 2024-12-24 RX ORDER — SODIUM CHLORIDE 9 MG/ML
INJECTION, SOLUTION INTRAVENOUS CONTINUOUS
Status: DISCONTINUED | OUTPATIENT
Start: 2024-12-24 | End: 2024-12-24

## 2024-12-24 RX ORDER — 0.9 % SODIUM CHLORIDE 0.9 %
500 INTRAVENOUS SOLUTION INTRAVENOUS ONCE
Status: DISCONTINUED | OUTPATIENT
Start: 2024-12-24 | End: 2024-12-24

## 2024-12-24 RX ORDER — METOPROLOL TARTRATE 25 MG/1
25 TABLET, FILM COATED ORAL 2 TIMES DAILY
Status: DISCONTINUED | OUTPATIENT
Start: 2024-12-24 | End: 2024-12-25 | Stop reason: HOSPADM

## 2024-12-24 RX ADMIN — SODIUM CHLORIDE: 9 INJECTION, SOLUTION INTRAVENOUS at 04:10

## 2024-12-24 RX ADMIN — AMIODARONE HYDROCHLORIDE 0.5 MG/MIN: 1.8 INJECTION, SOLUTION INTRAVENOUS at 17:37

## 2024-12-24 RX ADMIN — SODIUM CHLORIDE, PRESERVATIVE FREE 10 ML: 5 INJECTION INTRAVENOUS at 09:05

## 2024-12-24 RX ADMIN — INSULIN LISPRO 3 UNITS: 100 INJECTION, SOLUTION INTRAVENOUS; SUBCUTANEOUS at 16:36

## 2024-12-24 RX ADMIN — OXYCODONE 10 MG: 5 TABLET ORAL at 17:30

## 2024-12-24 RX ADMIN — INSULIN GLARGINE 16 UNITS: 100 INJECTION, SOLUTION SUBCUTANEOUS at 21:58

## 2024-12-24 RX ADMIN — INSULIN LISPRO 1 UNITS: 100 INJECTION, SOLUTION INTRAVENOUS; SUBCUTANEOUS at 07:58

## 2024-12-24 RX ADMIN — INSULIN LISPRO 3 UNITS: 100 INJECTION, SOLUTION INTRAVENOUS; SUBCUTANEOUS at 21:58

## 2024-12-24 RX ADMIN — DEXAMETHASONE 10 MG: 4 TABLET ORAL at 10:30

## 2024-12-24 RX ADMIN — METOPROLOL TARTRATE 25 MG: 25 TABLET, FILM COATED ORAL at 21:59

## 2024-12-24 RX ADMIN — Medication 1000 UNITS: at 09:05

## 2024-12-24 RX ADMIN — METOPROLOL TARTRATE 25 MG: 25 TABLET, FILM COATED ORAL at 10:31

## 2024-12-24 RX ADMIN — MORPHINE SULFATE 15 MG: 15 TABLET, FILM COATED, EXTENDED RELEASE ORAL at 09:05

## 2024-12-24 RX ADMIN — DULOXETINE HYDROCHLORIDE 30 MG: 30 CAPSULE, DELAYED RELEASE ORAL at 09:05

## 2024-12-24 RX ADMIN — OXYCODONE 10 MG: 5 TABLET ORAL at 11:19

## 2024-12-24 RX ADMIN — AMIODARONE HYDROCHLORIDE 1 MG/MIN: 1.8 INJECTION, SOLUTION INTRAVENOUS at 11:44

## 2024-12-24 RX ADMIN — DILTIAZEM HYDROCHLORIDE 240 MG: 240 CAPSULE, EXTENDED RELEASE ORAL at 09:05

## 2024-12-24 RX ADMIN — INSULIN LISPRO 1 UNITS: 100 INJECTION, SOLUTION INTRAVENOUS; SUBCUTANEOUS at 11:45

## 2024-12-24 RX ADMIN — MORPHINE SULFATE 15 MG: 15 TABLET, FILM COATED, EXTENDED RELEASE ORAL at 21:59

## 2024-12-24 ASSESSMENT — PAIN DESCRIPTION - FREQUENCY
FREQUENCY: INTERMITTENT
FREQUENCY: CONTINUOUS
FREQUENCY: INTERMITTENT
FREQUENCY: CONTINUOUS

## 2024-12-24 ASSESSMENT — PAIN DESCRIPTION - ONSET
ONSET: ON-GOING

## 2024-12-24 ASSESSMENT — PAIN DESCRIPTION - DESCRIPTORS
DESCRIPTORS: THROBBING

## 2024-12-24 ASSESSMENT — PAIN DESCRIPTION - PAIN TYPE
TYPE: ACUTE PAIN

## 2024-12-24 ASSESSMENT — PAIN DESCRIPTION - LOCATION
LOCATION: ARM;CHEST;BACK
LOCATION: ARM;CHEST;BACK
LOCATION: HEAD
LOCATION: ARM;CHEST;BACK
LOCATION: ARM;CHEST;BACK
LOCATION: CHEST
LOCATION: ARM;CHEST;BACK
LOCATION: CHEST
LOCATION: CHEST
LOCATION: ARM;CHEST;BACK

## 2024-12-24 ASSESSMENT — PAIN - FUNCTIONAL ASSESSMENT
PAIN_FUNCTIONAL_ASSESSMENT: PREVENTS OR INTERFERES SOME ACTIVE ACTIVITIES AND ADLS

## 2024-12-24 ASSESSMENT — PAIN DESCRIPTION - ORIENTATION
ORIENTATION: MID
ORIENTATION: RIGHT;LEFT;UPPER
ORIENTATION: RIGHT;LEFT;UPPER
ORIENTATION: MID;RIGHT;LEFT
ORIENTATION: POSTERIOR
ORIENTATION: RIGHT;LEFT;UPPER

## 2024-12-24 ASSESSMENT — PAIN SCALES - GENERAL
PAINLEVEL_OUTOF10: 8
PAINLEVEL_OUTOF10: 6
PAINLEVEL_OUTOF10: 7
PAINLEVEL_OUTOF10: 8
PAINLEVEL_OUTOF10: 8
PAINLEVEL_OUTOF10: 7
PAINLEVEL_OUTOF10: 7

## 2024-12-24 NOTE — CONSULTS
to my treatment were discussed.     I would like to thank you for providing me the opportunity to participate in the care of your patient. If you have any questions, please do not hesitate to contact me.     Rohith Jacobs MD, [facility], [place] [address]  Ph: [phone]  Fax: [phone]

## 2024-12-24 NOTE — PROGRESS NOTES
ONCOLOGY HEMATOLOGY CARE PROGRESS NOTE      SUBJECTIVE:    Patient complained of chest pain yesterday night, relieved with nitroglycerin.  Described the pain as heaviness radiating to the back.  Denies any associated palpitations or sweating.  The EKG revealed sinus rhythm.  High-sensitivity troponin was ordered which returned normal.  Today, denies any chest pain.  Patient has limited ambulation which as per the patient is secondary to his leg pain.  Does not use oxygen at home.    ROS:     As above    OBJECTIVE        Physical    VITALS:  Patient Vitals for the past 24 hrs:   BP Temp Temp src Pulse Resp SpO2 Height Weight   12/24/24 0855 136/68 99.7 °F (37.6 °C) Oral (!) 151 22 97 % -- --   12/24/24 0334 139/65 98.5 °F (36.9 °C) Oral (!) 119 22 100 % -- --   12/24/24 0015 -- -- -- (!) 110 -- 100 % -- --   12/24/24 0011 (!) 155/68 99 °F (37.2 °C) Oral (!) 111 20 100 % -- --   12/23/24 2350 -- -- -- -- 20 -- -- --   12/23/24 2237 (!) 144/60 -- -- (!) 119 -- -- -- --   12/23/24 2227 (!) 136/55 -- -- (!) 111 -- 100 % -- --   12/23/24 2124 136/77 -- -- (!) 107 22 99 % -- --   12/23/24 2029 (!) 150/67 97.9 °F (36.6 °C) Oral (!) 104 19 94 % -- --   12/23/24 1555 113/73 97.8 °F (36.6 °C) Oral 83 16 97 % -- --   12/23/24 1507 -- -- -- -- 20 -- -- --   12/23/24 1246 113/74 97.5 °F (36.4 °C) Oral 60 22 98 % 1.829 m (6') 112.3 kg (247 lb 9.2 oz)       24HR INTAKE/OUTPUT:    Intake/Output Summary (Last 24 hours) at 12/24/2024 0946  Last data filed at 12/24/2024 0645  Gross per 24 hour   Intake 1982.66 ml   Output 800 ml   Net 1182.66 ml       CONSTITUTIONAL: awake, alert, cooperative, no apparent distress   EYES: pupils equal, round and reactive to light, sclera clear and conjunctiva normal  ENT: Normocephalic, without obvious abnormality, atraumatic  NECK: supple, symmetrical, no jugular venous distension and no carotid bruits   HEMATOLOGIC/LYMPHATIC: no cervical, supraclavicular or

## 2024-12-24 NOTE — PLAN OF CARE
Problem: Pain  Goal: Verbalizes/displays adequate comfort level or baseline comfort level  Outcome: Not Progressing  Note: Pt reported pain this shift. PRN oxycodone given per MAR. Pt educated on importance of calling for pain meds when in pain. Pt verbalized understanding.      Problem: Safety - Adult  Goal: Free from fall injury  Outcome: Progressing  Note: Pt is a High fall risk. See Katz Fall Score and ABCDS Injury Risk assessments.   + Screening for Orthostasis and/or + High Fall Risk per KATZ/ABCDS: Explained fall risk precautions to pt and family and rationale behind their use to keep the patient safe. Pt bed is in low position, side rails up, call light and belongings are in reach. Fall wristband applied and present on pts wrist.  Bed alarm on.  Pt encouraged to call for assistance. Will continue with hourly rounds for PO intake, pain needs, toileting and repositioning as needed.      Problem: ABCDS Injury Assessment  Goal: Absence of physical injury  Outcome: Progressing  Note: No new reported or observed physical injuries this shift.

## 2024-12-24 NOTE — PROGRESS NOTES
RN received call from patient at 2120 that they had \"different\" chest pain than before and shortness of breath. RN ordered STAT EKG.  Vitals obtained. /77. . RR 22. SPO2 99%  Patient placed on 2L for comfort.     EKG resulted as Undetermined Rhythm, Non specific ST abnormality.    On call provider Dr. Suero contacted via [employer] and given result of EKG. RN also requested telemetry orders and inquired about pain medication. Patient requesting Oxycodone.   Per provider order troponin, okay to order tele, and okay to give oxycodone.     Provider called RN at 2200 and said okay to give Nitro. RN clarified if provider would like troponin level trended. Per provider one time draw, if normal do not need to trend.

## 2024-12-24 NOTE — PROGRESS NOTES
On call provider contacted via [employer] regarding patient CAR-T assessment and heart rate.   Patient had been 10/10 on CAR-T assessment. Patient at 0350 assessment wrote the sentence as \"Our national bird is the the bald eagle\". Patient added an extra \"the\".   Other questions were answered correctly. No fever at this time.   Provider also notified that patient HR would spike into 140s and sustain for a bit. Per patient chest pain from earlier tolerable.    Provider called RN and said to have patient write the sentence again, if the patient makes the same mistakes or motor skills are altered order Decadron and Keppra.   For heart rate, get a STAT EKG and start NS @ 100.    Provider notified patient re-wrote sentence without issue and of EKG result, Sinus Tach with PACs in a pattern of bigeminy. Non Specific ST and T wave abnormalities.     Per provider have Cardiology see patient in the morning.

## 2024-12-25 VITALS
OXYGEN SATURATION: 98 % | TEMPERATURE: 97.6 F | BODY MASS INDEX: 33.67 KG/M2 | SYSTOLIC BLOOD PRESSURE: 111 MMHG | DIASTOLIC BLOOD PRESSURE: 75 MMHG | HEIGHT: 72 IN | WEIGHT: 248.6 LBS | RESPIRATION RATE: 16 BRPM | HEART RATE: 81 BPM

## 2024-12-25 LAB
BASOPHILS # BLD: 0.1 K/UL (ref 0–0.2)
BASOPHILS NFR BLD: 0.9 %
DEPRECATED RDW RBC AUTO: 13.7 % (ref 12.4–15.4)
EOSINOPHIL # BLD: 0 K/UL (ref 0–0.6)
EOSINOPHIL NFR BLD: 0 %
GLUCOSE BLD-MCNC: 225 MG/DL (ref 70–99)
GLUCOSE BLD-MCNC: 268 MG/DL (ref 70–99)
HCT VFR BLD AUTO: 37 % (ref 40.5–52.5)
HGB BLD-MCNC: 12.3 G/DL (ref 13.5–17.5)
LYMPHOCYTES # BLD: 1.9 K/UL (ref 1–5.1)
LYMPHOCYTES NFR BLD: 14.2 %
MCH RBC QN AUTO: 32.6 PG (ref 26–34)
MCHC RBC AUTO-ENTMCNC: 33.1 G/DL (ref 31–36)
MCV RBC AUTO: 98.6 FL (ref 80–100)
MONOCYTES # BLD: 0.5 K/UL (ref 0–1.3)
MONOCYTES NFR BLD: 3.4 %
NEUTROPHILS # BLD: 10.9 K/UL (ref 1.7–7.7)
NEUTROPHILS NFR BLD: 81.5 %
PERFORMED ON: ABNORMAL
PERFORMED ON: ABNORMAL
PLATELET # BLD AUTO: 233 K/UL (ref 135–450)
PMV BLD AUTO: 7.5 FL (ref 5–10.5)
RBC # BLD AUTO: 3.76 M/UL (ref 4.2–5.9)
WBC # BLD AUTO: 13.4 K/UL (ref 4–11)

## 2024-12-25 PROCEDURE — 36592 COLLECT BLOOD FROM PICC: CPT

## 2024-12-25 PROCEDURE — 2500000003 HC RX 250 WO HCPCS: Performed by: INTERNAL MEDICINE

## 2024-12-25 PROCEDURE — 96361 HYDRATE IV INFUSION ADD-ON: CPT

## 2024-12-25 PROCEDURE — 6370000000 HC RX 637 (ALT 250 FOR IP): Performed by: NURSE PRACTITIONER

## 2024-12-25 PROCEDURE — G0378 HOSPITAL OBSERVATION PER HR: HCPCS

## 2024-12-25 PROCEDURE — 6360000002 HC RX W HCPCS: Performed by: INTERNAL MEDICINE

## 2024-12-25 PROCEDURE — 2500000003 HC RX 250 WO HCPCS: Performed by: NURSE PRACTITIONER

## 2024-12-25 PROCEDURE — 99233 SBSQ HOSP IP/OBS HIGH 50: CPT | Performed by: INTERNAL MEDICINE

## 2024-12-25 PROCEDURE — 96366 THER/PROPH/DIAG IV INF ADDON: CPT

## 2024-12-25 PROCEDURE — 6370000000 HC RX 637 (ALT 250 FOR IP): Performed by: INTERNAL MEDICINE

## 2024-12-25 PROCEDURE — 85025 COMPLETE CBC W/AUTO DIFF WBC: CPT

## 2024-12-25 RX ORDER — DILTIAZEM HYDROCHLORIDE 240 MG/1
240 CAPSULE, COATED, EXTENDED RELEASE ORAL DAILY
Qty: 30 CAPSULE | Refills: 3 | Status: SHIPPED | OUTPATIENT
Start: 2024-12-26

## 2024-12-25 RX ORDER — NITROGLYCERIN 0.4 MG/1
0.4 TABLET SUBLINGUAL EVERY 5 MIN PRN
Qty: 1 TABLET | Refills: 0 | Status: SHIPPED | OUTPATIENT
Start: 2024-12-25

## 2024-12-25 RX ORDER — METOPROLOL TARTRATE 25 MG/1
25 TABLET, FILM COATED ORAL 2 TIMES DAILY
Qty: 60 TABLET | Refills: 3 | Status: SHIPPED | OUTPATIENT
Start: 2024-12-25

## 2024-12-25 RX ADMIN — DEXAMETHASONE 10 MG: 4 TABLET ORAL at 08:08

## 2024-12-25 RX ADMIN — DULOXETINE HYDROCHLORIDE 30 MG: 30 CAPSULE, DELAYED RELEASE ORAL at 08:09

## 2024-12-25 RX ADMIN — METOPROLOL TARTRATE 25 MG: 25 TABLET, FILM COATED ORAL at 08:08

## 2024-12-25 RX ADMIN — OXYCODONE 10 MG: 5 TABLET ORAL at 00:15

## 2024-12-25 RX ADMIN — OXYCODONE 10 MG: 5 TABLET ORAL at 11:30

## 2024-12-25 RX ADMIN — AMIODARONE HYDROCHLORIDE 0.5 MG/MIN: 1.8 INJECTION, SOLUTION INTRAVENOUS at 06:03

## 2024-12-25 RX ADMIN — MORPHINE SULFATE 15 MG: 15 TABLET, FILM COATED, EXTENDED RELEASE ORAL at 08:09

## 2024-12-25 RX ADMIN — DILTIAZEM HYDROCHLORIDE 240 MG: 240 CAPSULE, EXTENDED RELEASE ORAL at 08:09

## 2024-12-25 RX ADMIN — INSULIN LISPRO 2 UNITS: 100 INJECTION, SOLUTION INTRAVENOUS; SUBCUTANEOUS at 11:40

## 2024-12-25 RX ADMIN — INSULIN LISPRO 2 UNITS: 100 INJECTION, SOLUTION INTRAVENOUS; SUBCUTANEOUS at 08:19

## 2024-12-25 RX ADMIN — SODIUM CHLORIDE, PRESERVATIVE FREE 10 ML: 5 INJECTION INTRAVENOUS at 00:02

## 2024-12-25 RX ADMIN — Medication 1000 UNITS: at 08:09

## 2024-12-25 ASSESSMENT — PAIN DESCRIPTION - FREQUENCY
FREQUENCY: CONTINUOUS

## 2024-12-25 ASSESSMENT — PAIN DESCRIPTION - ONSET
ONSET: ON-GOING

## 2024-12-25 ASSESSMENT — PAIN - FUNCTIONAL ASSESSMENT
PAIN_FUNCTIONAL_ASSESSMENT: ACTIVITIES ARE NOT PREVENTED
PAIN_FUNCTIONAL_ASSESSMENT: PREVENTS OR INTERFERES SOME ACTIVE ACTIVITIES AND ADLS

## 2024-12-25 ASSESSMENT — PAIN SCALES - GENERAL
PAINLEVEL_OUTOF10: 6
PAINLEVEL_OUTOF10: 8
PAINLEVEL_OUTOF10: 9
PAINLEVEL_OUTOF10: 7
PAINLEVEL_OUTOF10: 7

## 2024-12-25 ASSESSMENT — PAIN DESCRIPTION - PAIN TYPE
TYPE: ACUTE PAIN

## 2024-12-25 ASSESSMENT — PAIN DESCRIPTION - LOCATION
LOCATION: CHEST

## 2024-12-25 ASSESSMENT — PAIN DESCRIPTION - ORIENTATION
ORIENTATION: RIGHT;LEFT;MID
ORIENTATION: MID
ORIENTATION: RIGHT;LEFT;MID
ORIENTATION: RIGHT;LEFT

## 2024-12-25 ASSESSMENT — PAIN DESCRIPTION - DESCRIPTORS
DESCRIPTORS: ACHING;THROBBING
DESCRIPTORS: ACHING

## 2024-12-25 NOTE — PROGRESS NOTES
Patient discharged home at this time, son provided transportation. AVS reviewed with patient and son. Left with all possessions.

## 2024-12-25 NOTE — PROGRESS NOTES
Cardiology Consult Service  Daily Progress Note        Admit Date:  12/23/2024  Primary cardiologist: Dr Jacobs, new    Reason for Consultation/Chief Complaint: PAFRVR    Subjective:     Patient is 64-year-old man with history of smoking (20 years, quit 07/2024), HTN, HLP, MS, metastatic small cell lung cancer (metastasis to the brain status post XRT complicated by intracranial hemorrhage) on chemotherapy at the [facility], [facility].     Patient was admitted on 12/23 for elective immunotherapy (Imdelltra).  He received 1 dose on 12/23 and was kept for 24-hour observation.  Last night patient started complaining of some midsternal chest pains (non-pleuritic).  Telemetry also revealed AF RVR.  Cardiology was consulted today for further evaluation.  Patient reports very limited level of activity at home.  He reports exertional dyspnea (walking from room to room) with occasional chest discomfort for especially at night.  He has not had any cardiac evaluation recently.  ECG consistent with sinus tachycardia 100 bpm with frequent PACs (x 2).  Vital signs are normal.  Physical exam within normal limits except for 1+ pitting edema lower extremities.    Interval history:  Patient reports no complaints.  Telemetry consistent with AF with controlled ventricular response in the 60s.  Patient is on amiodarone drip, diltiazem, metoprolol.    Objective:     Medications:   metoprolol tartrate  25 mg Oral BID    sodium chloride flush  5-40 mL IntraVENous 2 times per day    Vitamin D  1,000 Units Oral Daily    dilTIAZem  240 mg Oral Daily    DULoxetine  30 mg Oral Daily    docusate sodium  200 mg Oral Nightly    morphine  15 mg Oral 2 times per day    insulin glargine  16 Units SubCUTAneous Nightly    insulin lispro  0-4 Units SubCUTAneous 4x Daily AC & HS       IV drips:   sodium chloride      dextrose         PRN:  sodium chloride flush, sodium chloride, polyethylene glycol, acetaminophen **OR** acetaminophen, albuterol, ALPRAZolam,

## 2024-12-25 NOTE — PLAN OF CARE
Problem: Pain  Goal: Verbalizes/displays adequate comfort level or baseline comfort level  12/25/2024 1301 by Dorys May RN  Outcome: Completed  12/25/2024 0635 by Kelly Barnhart RN  Outcome: Progressing  Flowsheets (Taken 12/25/2024 0635)  Verbalizes/displays adequate comfort level or baseline comfort level:   Encourage patient to monitor pain and request assistance   Assess pain using appropriate pain scale   Implement non-pharmacological measures as appropriate and evaluate response   Consider cultural and social influences on pain and pain management   Administer analgesics based on type and severity of pain and evaluate response   Notify Licensed Independent Practitioner if interventions unsuccessful or patient reports new pain  Note: Pt is satisfied with pain below an 8/10.      Problem: Safety - Adult  Goal: Free from fall injury  12/25/2024 1301 by Dorys May RN  Outcome: Completed  Flowsheets (Taken 12/25/2024 0800)  Free From Fall Injury: Instruct family/caregiver on patient safety  12/25/2024 0635 by Kelly Barnhart RN  Outcome: Progressing  Flowsheets (Taken 12/25/2024 0635)  Free From Fall Injury: Instruct family/caregiver on patient safety     Problem: ABCDS Injury Assessment  Goal: Absence of physical injury  12/25/2024 1301 by Dorys May RN  Outcome: Completed  Flowsheets (Taken 12/25/2024 0800)  Absence of Physical Injury: Implement safety measures based on patient assessment  12/25/2024 0635 by Kelly Barnhart RN  Outcome: Progressing  Flowsheets (Taken 12/25/2024 0635)  Absence of Physical Injury: Implement safety measures based on patient assessment

## 2024-12-25 NOTE — PLAN OF CARE
Problem: Pain  Goal: Verbalizes/displays adequate comfort level or baseline comfort level  12/25/2024 0635 by Kelly Barnhart RN  Outcome: Progressing  Flowsheets (Taken 12/25/2024 0635)  Verbalizes/displays adequate comfort level or baseline comfort level:   Encourage patient to monitor pain and request assistance   Assess pain using appropriate pain scale   Implement non-pharmacological measures as appropriate and evaluate response   Consider cultural and social influences on pain and pain management   Administer analgesics based on type and severity of pain and evaluate response   Notify Licensed Independent Practitioner if interventions unsuccessful or patient reports new pain  Note: Pt is satisfied with pain below an 8/10.      Problem: Safety - Adult  Goal: Free from fall injury  12/25/2024 0635 by Kelly Barnhart RN  Outcome: Progressing  Flowsheets (Taken 12/25/2024 0635)  Free From Fall Injury: Instruct family/caregiver on patient safety     Problem: ABCDS Injury Assessment  Goal: Absence of physical injury  12/25/2024 0635 by Kelly Barnhart RN  Outcome: Progressing  Flowsheets (Taken 12/25/2024 0635)  Absence of Physical Injury: Implement safety measures based on patient assessment     Problem: Pain  Goal: Verbalizes/displays adequate comfort level or baseline comfort level  12/25/2024 0635 by Kelly Barnhart RN  Outcome: Progressing  Flowsheets (Taken 12/25/2024 0635)  Verbalizes/displays adequate comfort level or baseline comfort level:   Encourage patient to monitor pain and request assistance   Assess pain using appropriate pain scale   Implement non-pharmacological measures as appropriate and evaluate response   Consider cultural and social influences on pain and pain management   Administer analgesics based on type and severity of pain and evaluate response   Notify Licensed Independent Practitioner if interventions unsuccessful or patient reports new pain  Note: Pt is satisfied with pain

## 2024-12-26 ENCOUNTER — HOSPITAL ENCOUNTER (OUTPATIENT)
Dept: CT IMAGING | Age: 64
Discharge: HOME | End: 2024-12-26
Attending: INTERNAL MEDICINE
Payer: MEDICARE

## 2024-12-26 DIAGNOSIS — C34.12 SQUAMOUS CELL CARCINOMA OF BRONCHUS IN LEFT UPPER LOBE (HCC): ICD-10-CM

## 2024-12-26 PROCEDURE — 70460 CT HEAD/BRAIN W/DYE: CPT

## 2024-12-26 PROCEDURE — 6360000004 HC RX CONTRAST MEDICATION: Performed by: INTERNAL MEDICINE

## 2024-12-26 RX ORDER — IOPAMIDOL 755 MG/ML
75 INJECTION, SOLUTION INTRAVASCULAR
Status: COMPLETED | OUTPATIENT
Start: 2024-12-26 | End: 2024-12-26

## 2024-12-26 RX ADMIN — IOPAMIDOL 75 ML: 755 INJECTION, SOLUTION INTRAVENOUS at 11:13

## 2024-12-30 ENCOUNTER — HOSPITAL ENCOUNTER (OUTPATIENT)
Age: 64
Setting detail: OBSERVATION
Discharge: HOME | End: 2024-12-31
Attending: INTERNAL MEDICINE | Admitting: INTERNAL MEDICINE
Payer: OTHER GOVERNMENT

## 2024-12-30 DIAGNOSIS — C34.90 SMALL CELL LUNG CANCER IN ADULT (HCC): Primary | ICD-10-CM

## 2024-12-30 PROCEDURE — 6370000000 HC RX 637 (ALT 250 FOR IP): Performed by: NURSE PRACTITIONER

## 2024-12-30 PROCEDURE — G0379 DIRECT REFER HOSPITAL OBSERV: HCPCS

## 2024-12-30 PROCEDURE — G0378 HOSPITAL OBSERVATION PER HR: HCPCS

## 2024-12-30 PROCEDURE — 6370000000 HC RX 637 (ALT 250 FOR IP): Performed by: INTERNAL MEDICINE

## 2024-12-30 PROCEDURE — 2580000003 HC RX 258: Performed by: NURSE PRACTITIONER

## 2024-12-30 RX ORDER — VITAMIN B COMPLEX
1000 TABLET ORAL DAILY
Status: DISCONTINUED | OUTPATIENT
Start: 2024-12-31 | End: 2024-12-31 | Stop reason: HOSPADM

## 2024-12-30 RX ORDER — SODIUM CHLORIDE 0.9 % (FLUSH) 0.9 %
5-40 SYRINGE (ML) INJECTION PRN
Status: DISCONTINUED | OUTPATIENT
Start: 2024-12-30 | End: 2024-12-31 | Stop reason: HOSPADM

## 2024-12-30 RX ORDER — ALBUTEROL SULFATE 90 UG/1
2 INHALANT RESPIRATORY (INHALATION) EVERY 4 HOURS PRN
Status: DISCONTINUED | OUTPATIENT
Start: 2024-12-30 | End: 2024-12-30

## 2024-12-30 RX ORDER — OXYCODONE HYDROCHLORIDE 5 MG/1
10 TABLET ORAL EVERY 4 HOURS PRN
Status: DISCONTINUED | OUTPATIENT
Start: 2024-12-30 | End: 2024-12-31 | Stop reason: HOSPADM

## 2024-12-30 RX ORDER — ALBUTEROL SULFATE 0.83 MG/ML
2.5 SOLUTION RESPIRATORY (INHALATION) EVERY 4 HOURS PRN
Status: DISCONTINUED | OUTPATIENT
Start: 2024-12-30 | End: 2024-12-31 | Stop reason: HOSPADM

## 2024-12-30 RX ORDER — FLUTICASONE PROPIONATE 50 MCG
1 SPRAY, SUSPENSION (ML) NASAL DAILY PRN
Status: DISCONTINUED | OUTPATIENT
Start: 2024-12-30 | End: 2024-12-31 | Stop reason: HOSPADM

## 2024-12-30 RX ORDER — METOPROLOL TARTRATE 25 MG/1
25 TABLET, FILM COATED ORAL 2 TIMES DAILY
Status: DISCONTINUED | OUTPATIENT
Start: 2024-12-30 | End: 2024-12-31 | Stop reason: HOSPADM

## 2024-12-30 RX ORDER — ACETAMINOPHEN 650 MG/1
650 SUPPOSITORY RECTAL EVERY 6 HOURS PRN
Status: DISCONTINUED | OUTPATIENT
Start: 2024-12-30 | End: 2024-12-31 | Stop reason: HOSPADM

## 2024-12-30 RX ORDER — SODIUM CHLORIDE 9 MG/ML
INJECTION, SOLUTION INTRAVENOUS CONTINUOUS
Status: ACTIVE | OUTPATIENT
Start: 2024-12-30 | End: 2024-12-30

## 2024-12-30 RX ORDER — MORPHINE SULFATE 15 MG/1
15 TABLET, FILM COATED, EXTENDED RELEASE ORAL EVERY 12 HOURS SCHEDULED
Status: DISCONTINUED | OUTPATIENT
Start: 2024-12-30 | End: 2024-12-31 | Stop reason: HOSPADM

## 2024-12-30 RX ORDER — ACETAMINOPHEN 325 MG/1
650 TABLET ORAL EVERY 6 HOURS PRN
Status: DISCONTINUED | OUTPATIENT
Start: 2024-12-30 | End: 2024-12-31 | Stop reason: HOSPADM

## 2024-12-30 RX ORDER — CETIRIZINE HYDROCHLORIDE 10 MG/1
10 TABLET ORAL DAILY
Status: DISCONTINUED | OUTPATIENT
Start: 2024-12-31 | End: 2024-12-31 | Stop reason: HOSPADM

## 2024-12-30 RX ORDER — POLYETHYLENE GLYCOL 3350 17 G/17G
17 POWDER, FOR SOLUTION ORAL DAILY PRN
Status: DISCONTINUED | OUTPATIENT
Start: 2024-12-30 | End: 2024-12-31 | Stop reason: HOSPADM

## 2024-12-30 RX ORDER — DULOXETIN HYDROCHLORIDE 30 MG/1
30 CAPSULE, DELAYED RELEASE ORAL DAILY
Status: DISCONTINUED | OUTPATIENT
Start: 2024-12-31 | End: 2024-12-31 | Stop reason: HOSPADM

## 2024-12-30 RX ORDER — SODIUM CHLORIDE 9 MG/ML
INJECTION, SOLUTION INTRAVENOUS PRN
Status: DISCONTINUED | OUTPATIENT
Start: 2024-12-30 | End: 2024-12-31 | Stop reason: HOSPADM

## 2024-12-30 RX ORDER — PANTOPRAZOLE SODIUM 40 MG/1
40 TABLET, DELAYED RELEASE ORAL
Status: DISCONTINUED | OUTPATIENT
Start: 2024-12-31 | End: 2024-12-31 | Stop reason: HOSPADM

## 2024-12-30 RX ORDER — SODIUM CHLORIDE 0.9 % (FLUSH) 0.9 %
5-40 SYRINGE (ML) INJECTION EVERY 12 HOURS SCHEDULED
Status: DISCONTINUED | OUTPATIENT
Start: 2024-12-30 | End: 2024-12-31 | Stop reason: HOSPADM

## 2024-12-30 RX ORDER — ATORVASTATIN CALCIUM 20 MG/1
20 TABLET, FILM COATED ORAL NIGHTLY
Status: DISCONTINUED | OUTPATIENT
Start: 2024-12-30 | End: 2024-12-31 | Stop reason: HOSPADM

## 2024-12-30 RX ORDER — BENZONATATE 100 MG/1
100 CAPSULE ORAL 3 TIMES DAILY PRN
Status: DISCONTINUED | OUTPATIENT
Start: 2024-12-30 | End: 2024-12-31 | Stop reason: HOSPADM

## 2024-12-30 RX ORDER — DILTIAZEM HYDROCHLORIDE 240 MG/1
240 CAPSULE, COATED, EXTENDED RELEASE ORAL DAILY
Status: DISCONTINUED | OUTPATIENT
Start: 2024-12-30 | End: 2024-12-31 | Stop reason: HOSPADM

## 2024-12-30 RX ADMIN — OXYCODONE 10 MG: 5 TABLET ORAL at 18:46

## 2024-12-30 RX ADMIN — METOPROLOL TARTRATE 25 MG: 25 TABLET, FILM COATED ORAL at 20:24

## 2024-12-30 RX ADMIN — SODIUM CHLORIDE: 9 INJECTION, SOLUTION INTRAVENOUS at 14:50

## 2024-12-30 RX ADMIN — ATORVASTATIN CALCIUM 20 MG: 20 TABLET, FILM COATED ORAL at 20:24

## 2024-12-30 RX ADMIN — OXYCODONE 10 MG: 5 TABLET ORAL at 23:04

## 2024-12-30 RX ADMIN — DILTIAZEM HYDROCHLORIDE 240 MG: 240 CAPSULE, EXTENDED RELEASE ORAL at 15:00

## 2024-12-30 RX ADMIN — MORPHINE SULFATE 15 MG: 15 TABLET, FILM COATED, EXTENDED RELEASE ORAL at 20:24

## 2024-12-30 ASSESSMENT — PAIN SCALES - GENERAL
PAINLEVEL_OUTOF10: 0
PAINLEVEL_OUTOF10: 8
PAINLEVEL_OUTOF10: 8
PAINLEVEL_OUTOF10: 3
PAINLEVEL_OUTOF10: 6
PAINLEVEL_OUTOF10: 4

## 2024-12-30 ASSESSMENT — PAIN - FUNCTIONAL ASSESSMENT
PAIN_FUNCTIONAL_ASSESSMENT: PREVENTS OR INTERFERES SOME ACTIVE ACTIVITIES AND ADLS

## 2024-12-30 ASSESSMENT — PAIN DESCRIPTION - LOCATION
LOCATION: BACK;SHOULDER
LOCATION: GENERALIZED
LOCATION: SHOULDER;BACK

## 2024-12-30 ASSESSMENT — PAIN DESCRIPTION - FREQUENCY
FREQUENCY: CONTINUOUS
FREQUENCY: CONTINUOUS

## 2024-12-30 ASSESSMENT — PAIN DESCRIPTION - PAIN TYPE
TYPE: CHRONIC PAIN
TYPE: CHRONIC PAIN

## 2024-12-30 ASSESSMENT — PAIN DESCRIPTION - DESCRIPTORS
DESCRIPTORS: ACHING
DESCRIPTORS: ACHING;DISCOMFORT
DESCRIPTORS: ACHING;DISCOMFORT

## 2024-12-30 ASSESSMENT — PAIN DESCRIPTION - ONSET
ONSET: ON-GOING
ONSET: ON-GOING

## 2024-12-30 ASSESSMENT — PAIN DESCRIPTION - ORIENTATION
ORIENTATION: MID
ORIENTATION: RIGHT;LEFT;UPPER
ORIENTATION: MID

## 2024-12-30 NOTE — PROGRESS NOTES
4 Eyes Admission Assessment     I agree as the admission nurse that 2 RN's have performed a thorough Head to Toe Skin Assessment on the patient. ALL assessment sites listed below have been assessed on admission.       Areas assessed by both nurses:   []   Head, Face, and Ears   []   Shoulders, Back, and Chest  []   Arms, Elbows, and Hands   []   Coccyx, Sacrum, and Ischium  []   Legs, Feet, and Heels        Does the Patient have Skin Breakdown?  No         Yvon Prevention initiated:  No   Wound Care Orders initiated:  No      [facility] nurse consulted for Pressure Injury (Stage 3,4, Unstageable, DTI, NWPT, and Complex wounds) or Yvon score 18 or lower:  No      Nurse 1 eSignature: Electronically signed by DEONTE ABDI RN on 12/30/24 at 4:37 PM EST    **SHARE this note so that the co-signing nurse is able to place an eSignature**    Nurse 2 eSignature: Electronically signed by Quita Veloz RN on 12/30/24 at 5:14 PM EST

## 2024-12-30 NOTE — H&P
cell lung cancer in adult (HCC)       IMPRESSION/RECOMMENDATIONS:    Extensive small cell lung carcinoma:  -Initial diagnosis July 2024  -T4, N2, M1 B  -Brain metastasis  -Progression based on CT scan 11/20/2024  - restaging CT CAP done 12/16/24  - attempted MRI Brain but could not complete due to metal fragments in his eye.      - he was seen by Dr. Waterhouse in the [facility] office today and received Cycle #1, day #8 Imdeltra  - he will be admitted for 23 hour observation     Multiple sclerosis:  -He is in a scooter. He has only been in the scooter for several months but had been using a cane for a much longer period of time    Pain  He will continue Oxycodone and Morphine ER     Type 2 diabetes:  -Currently on insulin  -We do not expect that tarlatamab will have negative effect on his diabetes.     Depression:  -This appears to be stable     Elevated cholesterol:  -Will likely not affect his treatment     COPD:  -He does not wear oxygen  -This is currently stable    Chest Pain  He had Chest pain during prior admission which was relieved with Nitro  EKG was normal, troponin was WNL      GARRET Alamo - CNP  Please Contact Through [employer]

## 2024-12-31 VITALS
RESPIRATION RATE: 18 BRPM | TEMPERATURE: 97.7 F | BODY MASS INDEX: 32.64 KG/M2 | DIASTOLIC BLOOD PRESSURE: 76 MMHG | HEART RATE: 75 BPM | HEIGHT: 72 IN | WEIGHT: 241 LBS | SYSTOLIC BLOOD PRESSURE: 146 MMHG | OXYGEN SATURATION: 100 %

## 2024-12-31 LAB
ALBUMIN SERPL-MCNC: 3.6 G/DL (ref 3.4–5)
ALBUMIN/GLOB SERPL: 1.3 {RATIO} (ref 1.1–2.2)
ALP SERPL-CCNC: 72 U/L (ref 40–129)
ALT SERPL-CCNC: 50 U/L (ref 10–40)
ANION GAP SERPL CALCULATED.3IONS-SCNC: 11 MMOL/L (ref 3–16)
AST SERPL-CCNC: 20 U/L (ref 15–37)
BASOPHILS # BLD: 0.1 K/UL (ref 0–0.2)
BASOPHILS NFR BLD: 0.8 %
BILIRUB SERPL-MCNC: 0.3 MG/DL (ref 0–1)
BUN SERPL-MCNC: 11 MG/DL (ref 7–20)
CALCIUM SERPL-MCNC: 9.3 MG/DL (ref 8.3–10.6)
CHLORIDE SERPL-SCNC: 95 MMOL/L (ref 99–110)
CO2 SERPL-SCNC: 24 MMOL/L (ref 21–32)
CREAT SERPL-MCNC: 0.6 MG/DL (ref 0.8–1.3)
CRP SERPL-MCNC: 12.1 MG/L (ref 0–5.1)
DEPRECATED RDW RBC AUTO: 13.7 % (ref 12.4–15.4)
EOSINOPHIL # BLD: 0 K/UL (ref 0–0.6)
EOSINOPHIL NFR BLD: 0.1 %
FERRITIN SERPL IA-MCNC: 442 NG/ML (ref 30–400)
GFR SERPLBLD CREATININE-BSD FMLA CKD-EPI: >90 ML/MIN/{1.73_M2}
GLUCOSE SERPL-MCNC: 246 MG/DL (ref 70–99)
HCT VFR BLD AUTO: 41.4 % (ref 40.5–52.5)
HGB BLD-MCNC: 13.8 G/DL (ref 13.5–17.5)
LYMPHOCYTES # BLD: 1 K/UL (ref 1–5.1)
LYMPHOCYTES NFR BLD: 9 %
MAGNESIUM SERPL-MCNC: 1.83 MG/DL (ref 1.8–2.4)
MCH RBC QN AUTO: 32.7 PG (ref 26–34)
MCHC RBC AUTO-ENTMCNC: 33.4 G/DL (ref 31–36)
MCV RBC AUTO: 97.7 FL (ref 80–100)
MONOCYTES # BLD: 0.4 K/UL (ref 0–1.3)
MONOCYTES NFR BLD: 3.8 %
NEUTROPHILS # BLD: 9.1 K/UL (ref 1.7–7.7)
NEUTROPHILS NFR BLD: 86.3 %
PLATELET # BLD AUTO: 384 K/UL (ref 135–450)
PMV BLD AUTO: 7.5 FL (ref 5–10.5)
POTASSIUM SERPL-SCNC: 3.9 MMOL/L (ref 3.5–5.1)
PROT SERPL-MCNC: 6.3 G/DL (ref 6.4–8.2)
RBC # BLD AUTO: 4.24 M/UL (ref 4.2–5.9)
SODIUM SERPL-SCNC: 130 MMOL/L (ref 136–145)
WBC # BLD AUTO: 10.6 K/UL (ref 4–11)

## 2024-12-31 PROCEDURE — 6370000000 HC RX 637 (ALT 250 FOR IP): Performed by: INTERNAL MEDICINE

## 2024-12-31 PROCEDURE — 6370000000 HC RX 637 (ALT 250 FOR IP): Performed by: NURSE PRACTITIONER

## 2024-12-31 PROCEDURE — 82728 ASSAY OF FERRITIN: CPT

## 2024-12-31 PROCEDURE — 85025 COMPLETE CBC W/AUTO DIFF WBC: CPT

## 2024-12-31 PROCEDURE — 83735 ASSAY OF MAGNESIUM: CPT

## 2024-12-31 PROCEDURE — 2500000003 HC RX 250 WO HCPCS: Performed by: NURSE PRACTITIONER

## 2024-12-31 PROCEDURE — 86140 C-REACTIVE PROTEIN: CPT

## 2024-12-31 PROCEDURE — G0378 HOSPITAL OBSERVATION PER HR: HCPCS

## 2024-12-31 PROCEDURE — 80053 COMPREHEN METABOLIC PANEL: CPT

## 2024-12-31 RX ADMIN — PANTOPRAZOLE SODIUM 40 MG: 40 TABLET, DELAYED RELEASE ORAL at 06:11

## 2024-12-31 RX ADMIN — DULOXETINE HYDROCHLORIDE 30 MG: 30 CAPSULE, DELAYED RELEASE ORAL at 07:57

## 2024-12-31 RX ADMIN — MORPHINE SULFATE 15 MG: 15 TABLET, FILM COATED, EXTENDED RELEASE ORAL at 07:58

## 2024-12-31 RX ADMIN — Medication 1000 UNITS: at 07:57

## 2024-12-31 RX ADMIN — Medication 10 ML: at 07:59

## 2024-12-31 RX ADMIN — DILTIAZEM HYDROCHLORIDE 240 MG: 240 CAPSULE, EXTENDED RELEASE ORAL at 07:57

## 2024-12-31 RX ADMIN — OXYCODONE 10 MG: 5 TABLET ORAL at 06:11

## 2024-12-31 RX ADMIN — METOPROLOL TARTRATE 25 MG: 25 TABLET, FILM COATED ORAL at 07:58

## 2024-12-31 RX ADMIN — CETIRIZINE HYDROCHLORIDE 10 MG: 10 TABLET, FILM COATED ORAL at 07:57

## 2024-12-31 ASSESSMENT — PAIN DESCRIPTION - PAIN TYPE: TYPE: CHRONIC PAIN

## 2024-12-31 ASSESSMENT — PAIN SCALES - GENERAL
PAINLEVEL_OUTOF10: 0
PAINLEVEL_OUTOF10: 7
PAINLEVEL_OUTOF10: 0

## 2024-12-31 ASSESSMENT — PAIN DESCRIPTION - FREQUENCY: FREQUENCY: CONTINUOUS

## 2024-12-31 ASSESSMENT — PAIN DESCRIPTION - ONSET: ONSET: ON-GOING

## 2024-12-31 ASSESSMENT — PAIN - FUNCTIONAL ASSESSMENT: PAIN_FUNCTIONAL_ASSESSMENT: ACTIVITIES ARE NOT PREVENTED

## 2024-12-31 ASSESSMENT — PAIN DESCRIPTION - DESCRIPTORS: DESCRIPTORS: ACHING

## 2024-12-31 ASSESSMENT — PAIN DESCRIPTION - ORIENTATION: ORIENTATION: RIGHT;LEFT;UPPER

## 2024-12-31 ASSESSMENT — PAIN DESCRIPTION - LOCATION: LOCATION: BACK

## 2024-12-31 NOTE — DISCHARGE SUMMARY
DISCHARGE SUMMARY NOTE    Patient ID: Ricardo Campbell 64 y.o. 1960     Admission Date: 12/30/2024     Discharge Date:  12/31/24    Admission Diagnoses: Small cell lung cancer in adult (HCC) [C34.90]    Discharge Diagnoses:   Patient Active Problem List   Diagnosis    Small cell lung cancer in adult (HCC)        Allergies: Bee venom and Chloraseptic [phenol]    Discharge Medications:    sodium chloride flush  5-40 mL IntraVENous 2 times per day    atorvastatin  20 mg Oral Nightly    dilTIAZem  240 mg Oral Daily    DULoxetine  30 mg Oral Daily    cetirizine  10 mg Oral Daily    metoprolol tartrate  25 mg Oral BID    Vitamin D  1,000 Units Oral Daily    pantoprazole  40 mg Oral QAM AC    olodaterol  2 puff Inhalation Daily    morphine  15 mg Oral 2 times per day       Vital Signs: BP (!) 146/76   Pulse 75   Temp 97.7 °F (36.5 °C) (Oral)   Resp 18   Ht 1.829 m (6') Comment: stated  Wt 109.3 kg (241 lb)   SpO2 100%   BMI 32.69 kg/m²   CONSTITUTIONAL:  Awake, alert & oriented x3  HEENT: PERRL, Neck: soft, supple, no cervical, supraclavicular or axillary adenopathy  RESPIRATORY:  No increased work of breathing, breath sounds decreased.  CARDIOVASCULAR:  Cardiac S1/S2, RRR  GASTROINTESTINAL:  abdomen soft +BS x4, no hepatosplenomegaly  SKIN:  negative for rash and skin lesion(s)  MUSCULOSKELETAL:  negative for pain and muscle weakness  EXTREMITIES: Negative for Lower extremity edema    Brief Summary of Patient's Course:    He was admitted following emile #1, day 8 Imdeltra for 23 hour observation. He did well following treatment. No recurrence of chest pain, which he had after the first treatment.       Discharged Condition: good    Disposition: home    Discharge Instructions:   Activity: activity as tolerated  Diet: regular diet  Wound Care: none needed  Follow up in the office in 1 week for follow up with Dr. Waterhouse and day #15       GARRET Alamo CNP, 12/31/2024, 11:03 AM

## 2024-12-31 NOTE — PROGRESS NOTES
ONCOLOGY HEMATOLOGY CARE PROGRESS NOTE      SUBJECTIVE: Sitting up on the side of the bed.  Feels well this morning.    ROS:  Constitutional:  No weight loss, No fever, No chills, No night sweats.  Energy level good.  Eyes:  No impairment or change in vision  ENT / Mouth:  No pain, abnormal ulceration, bleeding, nasal drip or change in voice or hearing  Cardiovascular:  No chest pain, palpitations, new edema, or calf discomfort  Respiratory:  No pain, hemoptysis, change to breathing  Breast:  No pain, discharge, change in appearance or texture  Gastrointestinal:  No pain, cramping, jaundice, change to eating and bowel habits  Urinary:  No pain, bleeding or change in continence  Genitalia: No pain, bleeding or discharge  Musculoskeletal:  No redness, pain, edema or weakness  Skin:  No pruritus, rash, change to nodules or lesions  Neurologic:  No discomfort, change in mental status, speech, sensory or motor activity  Psychiatric:  No change in concentration or change to affect or mood  Endocrine:  No hot flashes, increased thirst, or change to urine production  Hematologic: No petechiae, ecchymosis or bleeding  Lymphatic:  No lymphadenopathy or lymphedema  Allergy / Immunologic:  No eczema, hives, frequent or recurrent infections    OBJECTIVE      He has a history of extensive stage small cell lung carcinoma.  He was originally diagnosed in July 2024 and had a brain metastasis successfully treated with radiation at that time.  He was then treated with carboplatinum/-16 but was not given immunotherapy due to his MS dx.  He did relatively well, but had a quick relapse.  He presented yesterday for cycle #1, day #8 Imdeltra.     Physical    VITALS:  Patient Vitals for the past 24 hrs:   BP Temp Temp src Pulse Resp SpO2 Height Weight   12/31/24 0402 (!) 140/80 97.7 °F (36.5 °C) Oral 83 18 96 % -- --   12/30/24 2300 119/63 98 °F (36.7 °C) Oral 84 12 98 % -- --   12/30/24 2014 135/68

## 2024-12-31 NOTE — PROGRESS NOTES
Reviewed discharge instructions with patient and  family members.  Reviewed discharge medications including dosing, schedule, indication, and adverse reactions.  Reviewed which medications were already taken today and next dosage due for each medication.      Reviewed signs and symptoms that prompt a call to the physician and appropriate phone numbers. P  Reviewed follow up appointments that have been made in [facility] and Outpatient Oncology.  Low microbial diet, activity restrictions, and increased risk of infection were reviewed.     Patient is being discharged with IV access d/t need for ongoing therapy:      Type:  PAC                            Patient verbalized understanding of all instructions and questions were answered to his. satisfaction. Patient discharged to home per wheelchair with family members.      DEONTE ABDI RN

## 2024-12-31 NOTE — CARE COORDINATION
Addendum at 11:18am: Discharge order noted. SW not following. No HHC or DME orders entered in epic. No needs identified. 100% on room air per vitals in epic    8:21am: Case Management Assessment  Initial Evaluation    Date/Time of Evaluation: 12/31/2024 8:21 AM  Assessment Completed by: BRENDA Mclean   for [facility] [place] ([facility])  [employer] Mobile: [phone]    If patient is discharged prior to next notation, then this note serves as note for discharge by case management.    Patient Name: Ricardo Campbell                   YOB: 1960  Diagnosis: Small cell lung cancer in adult (HCC) [C34.90]                   Date / Time: 12/30/2024  2:13 PM    Patient Admission Status: Observation   Readmission Risk (Low < 19, Mod (19-27), High > 27): No data recorded  Current PCP: No primary care provider on file.  PCP verified by CM? Yes ([place])    Chart Reviewed: Yes      History Provided by: Patient  Patient Orientation: Alert and Oriented    Patient Cognition: Alert    Hospitalization in the last 30 days (Readmission):  Yes    If yes, Readmission Assessment in CM Navigator will be completed.    Advance Directives:      Code Status: Full Code   Patient's Primary Decision Maker is: Patient Declined (Legal Next of Kin Remains as Decision Maker)      Discharge Planning:    Patient lives with: Spouse/Significant Other Type of Home: House  Primary Care Giver: Self  Patient Support Systems include: Spouse/Significant Other, Children   Current Financial resources: Medicare, [place]  Current community resources: None  Current services prior to admission: Durable Medical Equipment, Other (Comment) ([facility], [facility] Care through [facility] ([facility] and [facility] 9 hrs a [facility])            Current DME: Walker, Cane, Other (Comment) (electric scooter)            Type of Home Care services:  None    ADLS  Prior functional level: Independent in

## 2025-01-07 ENCOUNTER — APPOINTMENT (OUTPATIENT)
Dept: GENERAL RADIOLOGY | Age: 65
End: 2025-01-07
Payer: OTHER GOVERNMENT

## 2025-01-07 ENCOUNTER — HOSPITAL ENCOUNTER (INPATIENT)
Age: 65
LOS: 1 days | Discharge: HOME | End: 2025-01-09
Attending: EMERGENCY MEDICINE | Admitting: INTERNAL MEDICINE
Payer: OTHER GOVERNMENT

## 2025-01-07 DIAGNOSIS — I48.91 ATRIAL FIBRILLATION WITH RVR (HCC): Primary | ICD-10-CM

## 2025-01-07 DIAGNOSIS — I48.11 LONGSTANDING PERSISTENT ATRIAL FIBRILLATION (HCC): ICD-10-CM

## 2025-01-07 DIAGNOSIS — C34.90 SMALL CELL CARCINOMA OF LUNG, UNSPECIFIED LATERALITY, UNSPECIFIED PART OF LUNG (HCC): ICD-10-CM

## 2025-01-07 LAB
ALBUMIN SERPL-MCNC: 3.3 G/DL (ref 3.4–5)
ALBUMIN/GLOB SERPL: 0.9 {RATIO} (ref 1.1–2.2)
ALP SERPL-CCNC: 96 U/L (ref 40–129)
ALT SERPL-CCNC: ABNORMAL U/L (ref 10–40)
ANION GAP SERPL CALCULATED.3IONS-SCNC: 15 MMOL/L (ref 3–16)
AST SERPL-CCNC: 37 U/L (ref 15–37)
BASE EXCESS BLDV CALC-SCNC: 1.8 MMOL/L (ref -2–3)
BASOPHILS # BLD: 0.1 K/UL (ref 0–0.2)
BASOPHILS NFR BLD: 0.4 %
BILIRUB SERPL-MCNC: 0.8 MG/DL (ref 0–1)
BUN SERPL-MCNC: 11 MG/DL (ref 7–20)
CALCIUM SERPL-MCNC: 9.3 MG/DL (ref 8.3–10.6)
CHLORIDE SERPL-SCNC: 97 MMOL/L (ref 99–110)
CO2 BLDV-SCNC: 27 MMOL/L
CO2 SERPL-SCNC: 19 MMOL/L (ref 21–32)
COHGB MFR BLDV: 0.3 % (ref 0–1.5)
CREAT SERPL-MCNC: 0.8 MG/DL (ref 0.8–1.3)
DEPRECATED RDW RBC AUTO: 13.7 % (ref 12.4–15.4)
DO-HGB MFR BLDV: 18.6 %
EOSINOPHIL # BLD: 0 K/UL (ref 0–0.6)
EOSINOPHIL NFR BLD: 0.3 %
FLUAV RNA RESP QL NAA+PROBE: NOT DETECTED
FLUBV RNA RESP QL NAA+PROBE: NOT DETECTED
GFR SERPLBLD CREATININE-BSD FMLA CKD-EPI: >90 ML/MIN/{1.73_M2}
GLUCOSE SERPL-MCNC: 131 MG/DL (ref 70–99)
HCO3 BLDV-SCNC: 25.5 MMOL/L (ref 24–28)
HCT VFR BLD AUTO: 43.2 % (ref 40.5–52.5)
HGB BLD-MCNC: 14.6 G/DL (ref 13.5–17.5)
LACTATE BLDV-SCNC: 1.9 MMOL/L (ref 0.4–2)
LDH SERPL L TO P-CCNC: 620 U/L (ref 100–190)
LYMPHOCYTES # BLD: 1 K/UL (ref 1–5.1)
LYMPHOCYTES NFR BLD: 6.2 %
MAGNESIUM SERPL-MCNC: 1.53 MG/DL (ref 1.8–2.4)
MCH RBC QN AUTO: 32.6 PG (ref 26–34)
MCHC RBC AUTO-ENTMCNC: 33.7 G/DL (ref 31–36)
MCV RBC AUTO: 96.8 FL (ref 80–100)
METHGB MFR BLDV: 0.2 % (ref 0–1.5)
MONOCYTES # BLD: 0.5 K/UL (ref 0–1.3)
MONOCYTES NFR BLD: 3.2 %
NEUTROPHILS # BLD: 15.1 K/UL (ref 1.7–7.7)
NEUTROPHILS NFR BLD: 89.9 %
NT-PROBNP SERPL-MCNC: 647 PG/ML (ref 0–124)
PCO2 BLDV: 36.4 MMHG (ref 41–51)
PH BLDV: 7.45 [PH] (ref 7.35–7.45)
PLATELET # BLD AUTO: 313 K/UL (ref 135–450)
PMV BLD AUTO: 8.2 FL (ref 5–10.5)
PO2 BLDV: 46.5 MMHG (ref 25–40)
POTASSIUM SERPL-SCNC: ABNORMAL MMOL/L (ref 3.5–5.1)
PROT SERPL-MCNC: 6.9 G/DL (ref 6.4–8.2)
RBC # BLD AUTO: 4.46 M/UL (ref 4.2–5.9)
SAO2 % BLDV: 81 %
SARS-COV-2 RNA RESP QL NAA+PROBE: NOT DETECTED
SODIUM SERPL-SCNC: 131 MMOL/L (ref 136–145)
TROPONIN, HIGH SENSITIVITY: 26 NG/L (ref 0–22)
URATE SERPL-MCNC: 2.5 MG/DL (ref 3.5–7.2)
WBC # BLD AUTO: 16.8 K/UL (ref 4–11)

## 2025-01-07 PROCEDURE — 83735 ASSAY OF MAGNESIUM: CPT

## 2025-01-07 PROCEDURE — 93005 ELECTROCARDIOGRAM TRACING: CPT | Performed by: NURSE PRACTITIONER

## 2025-01-07 PROCEDURE — 87636 SARSCOV2 & INF A&B AMP PRB: CPT

## 2025-01-07 PROCEDURE — 82803 BLOOD GASES ANY COMBINATION: CPT

## 2025-01-07 PROCEDURE — 85025 COMPLETE CBC W/AUTO DIFF WBC: CPT

## 2025-01-07 PROCEDURE — 84484 ASSAY OF TROPONIN QUANT: CPT

## 2025-01-07 PROCEDURE — 83615 LACTATE (LD) (LDH) ENZYME: CPT

## 2025-01-07 PROCEDURE — 80053 COMPREHEN METABOLIC PANEL: CPT

## 2025-01-07 PROCEDURE — 84550 ASSAY OF BLOOD/URIC ACID: CPT

## 2025-01-07 PROCEDURE — 83605 ASSAY OF LACTIC ACID: CPT

## 2025-01-07 PROCEDURE — 2580000003 HC RX 258: Performed by: NURSE PRACTITIONER

## 2025-01-07 PROCEDURE — 83880 ASSAY OF NATRIURETIC PEPTIDE: CPT

## 2025-01-07 PROCEDURE — 99285 EMERGENCY DEPT VISIT HI MDM: CPT

## 2025-01-07 PROCEDURE — 71046 X-RAY EXAM CHEST 2 VIEWS: CPT

## 2025-01-07 RX ORDER — SODIUM CHLORIDE, SODIUM LACTATE, POTASSIUM CHLORIDE, AND CALCIUM CHLORIDE .6; .31; .03; .02 G/100ML; G/100ML; G/100ML; G/100ML
1000 INJECTION, SOLUTION INTRAVENOUS ONCE
Status: COMPLETED | OUTPATIENT
Start: 2025-01-07 | End: 2025-01-08

## 2025-01-07 RX ORDER — MAGNESIUM SULFATE 1 G/100ML
1000 INJECTION INTRAVENOUS ONCE
Status: COMPLETED | OUTPATIENT
Start: 2025-01-08 | End: 2025-01-08

## 2025-01-07 RX ADMIN — SODIUM CHLORIDE, POTASSIUM CHLORIDE, SODIUM LACTATE AND CALCIUM CHLORIDE 1000 ML: 600; 310; 30; 20 INJECTION, SOLUTION INTRAVENOUS at 23:19

## 2025-01-07 ASSESSMENT — PAIN DESCRIPTION - ORIENTATION: ORIENTATION: RIGHT;LEFT

## 2025-01-07 ASSESSMENT — PAIN SCALES - GENERAL: PAINLEVEL_OUTOF10: 8

## 2025-01-07 ASSESSMENT — PAIN DESCRIPTION - LOCATION: LOCATION: CHEST;SHOULDER

## 2025-01-07 ASSESSMENT — PAIN - FUNCTIONAL ASSESSMENT: PAIN_FUNCTIONAL_ASSESSMENT: 0-10

## 2025-01-08 ENCOUNTER — APPOINTMENT (OUTPATIENT)
Dept: CT IMAGING | Age: 65
End: 2025-01-08
Payer: OTHER GOVERNMENT

## 2025-01-08 PROBLEM — I48.91 ATRIAL FIBRILLATION WITH RAPID VENTRICULAR RESPONSE (HCC): Status: ACTIVE | Noted: 2025-01-08

## 2025-01-08 LAB
BASOPHILS # BLD: 0.1 K/UL (ref 0–0.2)
BASOPHILS NFR BLD: 1.2 %
BILIRUB UR QL STRIP.AUTO: NEGATIVE
CLARITY UR: CLEAR
COLOR UR: YELLOW
DEPRECATED RDW RBC AUTO: 13.7 % (ref 12.4–15.4)
EKG ATRIAL RATE: 124 BPM
EKG ATRIAL RATE: 150 BPM
EKG DIAGNOSIS: NORMAL
EKG DIAGNOSIS: NORMAL
EKG Q-T INTERVAL: 302 MS
EKG Q-T INTERVAL: 316 MS
EKG QRS DURATION: 70 MS
EKG QRS DURATION: 76 MS
EKG QTC CALCULATION (BAZETT): 453 MS
EKG QTC CALCULATION (BAZETT): 482 MS
EKG R AXIS: 71 DEGREES
EKG R AXIS: 72 DEGREES
EKG T AXIS: 40 DEGREES
EKG T AXIS: 99 DEGREES
EKG VENTRICULAR RATE: 124 BPM
EKG VENTRICULAR RATE: 153 BPM
EOSINOPHIL # BLD: 0.1 K/UL (ref 0–0.6)
EOSINOPHIL NFR BLD: 0.5 %
GLUCOSE UR STRIP.AUTO-MCNC: NEGATIVE MG/DL
HCT VFR BLD AUTO: 40.3 % (ref 40.5–52.5)
HGB BLD-MCNC: 13.7 G/DL (ref 13.5–17.5)
HGB UR QL STRIP.AUTO: NEGATIVE
KETONES UR STRIP.AUTO-MCNC: NEGATIVE MG/DL
LEUKOCYTE ESTERASE UR QL STRIP.AUTO: NEGATIVE
LYMPHOCYTES # BLD: 1.5 K/UL (ref 1–5.1)
LYMPHOCYTES NFR BLD: 12.2 %
MCH RBC QN AUTO: 33.1 PG (ref 26–34)
MCHC RBC AUTO-ENTMCNC: 33.9 G/DL (ref 31–36)
MCV RBC AUTO: 97.5 FL (ref 80–100)
MONOCYTES # BLD: 0.8 K/UL (ref 0–1.3)
MONOCYTES NFR BLD: 6.8 %
MUCOUS THREADS #/AREA URNS LPF: ABNORMAL /LPF
NEUTROPHILS # BLD: 9.6 K/UL (ref 1.7–7.7)
NEUTROPHILS NFR BLD: 79.3 %
NITRITE UR QL STRIP.AUTO: NEGATIVE
PH UR STRIP.AUTO: 6 [PH] (ref 5–8)
PLATELET # BLD AUTO: 275 K/UL (ref 135–450)
PMV BLD AUTO: 7.2 FL (ref 5–10.5)
POTASSIUM SERPL-SCNC: 3.7 MMOL/L (ref 3.5–5.1)
PROT UR STRIP.AUTO-MCNC: NEGATIVE MG/DL
RBC # BLD AUTO: 4.13 M/UL (ref 4.2–5.9)
RBC #/AREA URNS HPF: ABNORMAL /HPF (ref 0–4)
SP GR UR STRIP.AUTO: 1.01 (ref 1–1.03)
TROPONIN, HIGH SENSITIVITY: 25 NG/L (ref 0–22)
UA DIPSTICK W REFLEX MICRO PNL UR: ABNORMAL
URN SPEC COLLECT METH UR: ABNORMAL
UROBILINOGEN UR STRIP-ACNC: 0.2 E.U./DL
WBC # BLD AUTO: 12.1 K/UL (ref 4–11)
WBC #/AREA URNS HPF: ABNORMAL /HPF (ref 0–5)

## 2025-01-08 PROCEDURE — 96374 THER/PROPH/DIAG INJ IV PUSH: CPT

## 2025-01-08 PROCEDURE — 71260 CT THORAX DX C+: CPT

## 2025-01-08 PROCEDURE — 85025 COMPLETE CBC W/AUTO DIFF WBC: CPT

## 2025-01-08 PROCEDURE — 94660 CPAP INITIATION&MGMT: CPT

## 2025-01-08 PROCEDURE — 2700000000 HC OXYGEN THERAPY PER DAY

## 2025-01-08 PROCEDURE — 6360000002 HC RX W HCPCS: Performed by: INTERNAL MEDICINE

## 2025-01-08 PROCEDURE — 2580000003 HC RX 258: Performed by: NURSE PRACTITIONER

## 2025-01-08 PROCEDURE — 96366 THER/PROPH/DIAG IV INF ADDON: CPT

## 2025-01-08 PROCEDURE — 99291 CRITICAL CARE FIRST HOUR: CPT | Performed by: INTERNAL MEDICINE

## 2025-01-08 PROCEDURE — 36415 COLL VENOUS BLD VENIPUNCTURE: CPT

## 2025-01-08 PROCEDURE — 84484 ASSAY OF TROPONIN QUANT: CPT

## 2025-01-08 PROCEDURE — 94761 N-INVAS EAR/PLS OXIMETRY MLT: CPT

## 2025-01-08 PROCEDURE — 96365 THER/PROPH/DIAG IV INF INIT: CPT

## 2025-01-08 PROCEDURE — 2500000003 HC RX 250 WO HCPCS: Performed by: NURSE PRACTITIONER

## 2025-01-08 PROCEDURE — 6360000004 HC RX CONTRAST MEDICATION: Performed by: NURSE PRACTITIONER

## 2025-01-08 PROCEDURE — 2500000003 HC RX 250 WO HCPCS: Performed by: EMERGENCY MEDICINE

## 2025-01-08 PROCEDURE — 94640 AIRWAY INHALATION TREATMENT: CPT

## 2025-01-08 PROCEDURE — 5A09357 ASSISTANCE WITH RESPIRATORY VENTILATION, LESS THAN 24 CONSECUTIVE HOURS, CONTINUOUS POSITIVE AIRWAY PRESSURE: ICD-10-PCS | Performed by: INTERNAL MEDICINE

## 2025-01-08 PROCEDURE — 84132 ASSAY OF SERUM POTASSIUM: CPT

## 2025-01-08 PROCEDURE — 6360000002 HC RX W HCPCS: Performed by: EMERGENCY MEDICINE

## 2025-01-08 PROCEDURE — 6370000000 HC RX 637 (ALT 250 FOR IP): Performed by: INTERNAL MEDICINE

## 2025-01-08 PROCEDURE — 81001 URINALYSIS AUTO W/SCOPE: CPT

## 2025-01-08 PROCEDURE — 93005 ELECTROCARDIOGRAM TRACING: CPT | Performed by: NURSE PRACTITIONER

## 2025-01-08 PROCEDURE — 6370000000 HC RX 637 (ALT 250 FOR IP): Performed by: NURSE PRACTITIONER

## 2025-01-08 PROCEDURE — 2500000003 HC RX 250 WO HCPCS: Performed by: INTERNAL MEDICINE

## 2025-01-08 PROCEDURE — 87040 BLOOD CULTURE FOR BACTERIA: CPT

## 2025-01-08 PROCEDURE — 96367 TX/PROPH/DG ADDL SEQ IV INF: CPT

## 2025-01-08 PROCEDURE — 2060000000 HC ICU INTERMEDIATE R&B

## 2025-01-08 RX ORDER — POLYETHYLENE GLYCOL 3350 17 G/17G
17 POWDER, FOR SOLUTION ORAL DAILY PRN
Status: DISCONTINUED | OUTPATIENT
Start: 2025-01-08 | End: 2025-01-09 | Stop reason: HOSPADM

## 2025-01-08 RX ORDER — METOPROLOL TARTRATE 25 MG/1
25 TABLET, FILM COATED ORAL 2 TIMES DAILY
Status: DISCONTINUED | OUTPATIENT
Start: 2025-01-08 | End: 2025-01-09 | Stop reason: HOSPADM

## 2025-01-08 RX ORDER — ACETAMINOPHEN 325 MG/1
650 TABLET ORAL EVERY 6 HOURS PRN
Status: DISCONTINUED | OUTPATIENT
Start: 2025-01-08 | End: 2025-01-09 | Stop reason: HOSPADM

## 2025-01-08 RX ORDER — PROPRANOLOL HYDROCHLORIDE 80 MG/1
80 TABLET ORAL 2 TIMES DAILY
Status: ON HOLD | COMMUNITY
End: 2025-01-09 | Stop reason: HOSPADM

## 2025-01-08 RX ORDER — ONDANSETRON 2 MG/ML
4 INJECTION INTRAMUSCULAR; INTRAVENOUS EVERY 6 HOURS PRN
Status: DISCONTINUED | OUTPATIENT
Start: 2025-01-08 | End: 2025-01-09 | Stop reason: HOSPADM

## 2025-01-08 RX ORDER — NALOXONE HYDROCHLORIDE 0.4 MG/ML
0.4 INJECTION, SOLUTION INTRAMUSCULAR; INTRAVENOUS; SUBCUTANEOUS PRN
Status: DISCONTINUED | OUTPATIENT
Start: 2025-01-08 | End: 2025-01-09 | Stop reason: HOSPADM

## 2025-01-08 RX ORDER — VITAMIN B COMPLEX
1000 TABLET ORAL DAILY
Status: DISCONTINUED | OUTPATIENT
Start: 2025-01-08 | End: 2025-01-09 | Stop reason: HOSPADM

## 2025-01-08 RX ORDER — SODIUM CHLORIDE 0.9 % (FLUSH) 0.9 %
5-40 SYRINGE (ML) INJECTION EVERY 12 HOURS SCHEDULED
Status: DISCONTINUED | OUTPATIENT
Start: 2025-01-08 | End: 2025-01-09 | Stop reason: HOSPADM

## 2025-01-08 RX ORDER — OXYCODONE HYDROCHLORIDE 5 MG/1
10 TABLET ORAL EVERY 6 HOURS PRN
Status: DISCONTINUED | OUTPATIENT
Start: 2025-01-08 | End: 2025-01-09 | Stop reason: HOSPADM

## 2025-01-08 RX ORDER — PANTOPRAZOLE SODIUM 40 MG/1
40 TABLET, DELAYED RELEASE ORAL
Status: DISCONTINUED | OUTPATIENT
Start: 2025-01-09 | End: 2025-01-09 | Stop reason: HOSPADM

## 2025-01-08 RX ORDER — IPRATROPIUM BROMIDE AND ALBUTEROL SULFATE 2.5; .5 MG/3ML; MG/3ML
1 SOLUTION RESPIRATORY (INHALATION)
Status: DISCONTINUED | OUTPATIENT
Start: 2025-01-08 | End: 2025-01-09 | Stop reason: HOSPADM

## 2025-01-08 RX ORDER — ATORVASTATIN CALCIUM 20 MG/1
20 TABLET, FILM COATED ORAL NIGHTLY
Status: DISCONTINUED | OUTPATIENT
Start: 2025-01-08 | End: 2025-01-09 | Stop reason: HOSPADM

## 2025-01-08 RX ORDER — POTASSIUM CHLORIDE 1500 MG/1
40 TABLET, EXTENDED RELEASE ORAL PRN
Status: DISCONTINUED | OUTPATIENT
Start: 2025-01-08 | End: 2025-01-09 | Stop reason: HOSPADM

## 2025-01-08 RX ORDER — FLUTICASONE PROPIONATE 50 MCG
1 SPRAY, SUSPENSION (ML) NASAL DAILY PRN
Status: DISCONTINUED | OUTPATIENT
Start: 2025-01-08 | End: 2025-01-09 | Stop reason: HOSPADM

## 2025-01-08 RX ORDER — SODIUM CHLORIDE 9 MG/ML
INJECTION, SOLUTION INTRAVENOUS PRN
Status: DISCONTINUED | OUTPATIENT
Start: 2025-01-08 | End: 2025-01-09 | Stop reason: HOSPADM

## 2025-01-08 RX ORDER — ACETAMINOPHEN 650 MG/1
650 SUPPOSITORY RECTAL EVERY 6 HOURS PRN
Status: DISCONTINUED | OUTPATIENT
Start: 2025-01-08 | End: 2025-01-09 | Stop reason: HOSPADM

## 2025-01-08 RX ORDER — 0.9 % SODIUM CHLORIDE 0.9 %
500 INTRAVENOUS SOLUTION INTRAVENOUS ONCE
Status: DISCONTINUED | OUTPATIENT
Start: 2025-01-08 | End: 2025-01-09 | Stop reason: HOSPADM

## 2025-01-08 RX ORDER — POTASSIUM CHLORIDE 7.45 MG/ML
10 INJECTION INTRAVENOUS PRN
Status: DISCONTINUED | OUTPATIENT
Start: 2025-01-08 | End: 2025-01-09 | Stop reason: HOSPADM

## 2025-01-08 RX ORDER — DULOXETIN HYDROCHLORIDE 30 MG/1
30 CAPSULE, DELAYED RELEASE ORAL DAILY
Status: DISCONTINUED | OUTPATIENT
Start: 2025-01-08 | End: 2025-01-09 | Stop reason: HOSPADM

## 2025-01-08 RX ORDER — MECOBALAMIN 5000 MCG
5 TABLET,DISINTEGRATING ORAL NIGHTLY
Status: DISCONTINUED | OUTPATIENT
Start: 2025-01-08 | End: 2025-01-09 | Stop reason: HOSPADM

## 2025-01-08 RX ORDER — DILTIAZEM HYDROCHLORIDE 240 MG/1
240 CAPSULE, COATED, EXTENDED RELEASE ORAL DAILY
Status: DISCONTINUED | OUTPATIENT
Start: 2025-01-08 | End: 2025-01-09 | Stop reason: HOSPADM

## 2025-01-08 RX ORDER — SODIUM CHLORIDE 0.9 % (FLUSH) 0.9 %
5-40 SYRINGE (ML) INJECTION PRN
Status: DISCONTINUED | OUTPATIENT
Start: 2025-01-08 | End: 2025-01-09 | Stop reason: HOSPADM

## 2025-01-08 RX ORDER — DILTIAZEM HYDROCHLORIDE 5 MG/ML
10 INJECTION INTRAVENOUS ONCE
Status: COMPLETED | OUTPATIENT
Start: 2025-01-08 | End: 2025-01-08

## 2025-01-08 RX ORDER — MORPHINE SULFATE 2 MG/ML
2 INJECTION, SOLUTION INTRAMUSCULAR; INTRAVENOUS EVERY 4 HOURS PRN
Status: DISCONTINUED | OUTPATIENT
Start: 2025-01-08 | End: 2025-01-09 | Stop reason: HOSPADM

## 2025-01-08 RX ORDER — IPRATROPIUM BROMIDE AND ALBUTEROL SULFATE 2.5; .5 MG/3ML; MG/3ML
1 SOLUTION RESPIRATORY (INHALATION) 3 TIMES DAILY
Status: DISCONTINUED | OUTPATIENT
Start: 2025-01-08 | End: 2025-01-08

## 2025-01-08 RX ORDER — BENZONATATE 100 MG/1
100 CAPSULE ORAL 3 TIMES DAILY PRN
Status: DISCONTINUED | OUTPATIENT
Start: 2025-01-08 | End: 2025-01-09 | Stop reason: HOSPADM

## 2025-01-08 RX ORDER — ENOXAPARIN SODIUM 100 MG/ML
30 INJECTION SUBCUTANEOUS 2 TIMES DAILY
Status: DISCONTINUED | OUTPATIENT
Start: 2025-01-08 | End: 2025-01-09 | Stop reason: HOSPADM

## 2025-01-08 RX ORDER — METOPROLOL TARTRATE 1 MG/ML
5 INJECTION, SOLUTION INTRAVENOUS ONCE
Status: COMPLETED | OUTPATIENT
Start: 2025-01-08 | End: 2025-01-08

## 2025-01-08 RX ORDER — IOPAMIDOL 755 MG/ML
75 INJECTION, SOLUTION INTRAVASCULAR
Status: COMPLETED | OUTPATIENT
Start: 2025-01-08 | End: 2025-01-08

## 2025-01-08 RX ORDER — MORPHINE SULFATE 2 MG/ML
2 INJECTION, SOLUTION INTRAMUSCULAR; INTRAVENOUS EVERY 4 HOURS PRN
Status: DISCONTINUED | OUTPATIENT
Start: 2025-01-08 | End: 2025-01-08

## 2025-01-08 RX ORDER — CETIRIZINE HYDROCHLORIDE 10 MG/1
10 TABLET ORAL DAILY
Status: DISCONTINUED | OUTPATIENT
Start: 2025-01-08 | End: 2025-01-09 | Stop reason: HOSPADM

## 2025-01-08 RX ORDER — ONDANSETRON 4 MG/1
4 TABLET, ORALLY DISINTEGRATING ORAL EVERY 8 HOURS PRN
Status: DISCONTINUED | OUTPATIENT
Start: 2025-01-08 | End: 2025-01-09 | Stop reason: HOSPADM

## 2025-01-08 RX ORDER — MAGNESIUM SULFATE IN WATER 40 MG/ML
2000 INJECTION, SOLUTION INTRAVENOUS PRN
Status: DISCONTINUED | OUTPATIENT
Start: 2025-01-08 | End: 2025-01-09 | Stop reason: HOSPADM

## 2025-01-08 RX ORDER — ALBUTEROL SULFATE 0.83 MG/ML
2.5 SOLUTION RESPIRATORY (INHALATION) EVERY 4 HOURS PRN
Status: DISCONTINUED | OUTPATIENT
Start: 2025-01-08 | End: 2025-01-09 | Stop reason: HOSPADM

## 2025-01-08 RX ADMIN — METOPROLOL TARTRATE 25 MG: 25 TABLET, FILM COATED ORAL at 21:12

## 2025-01-08 RX ADMIN — DILTIAZEM HYDROCHLORIDE 10 MG: 5 INJECTION, SOLUTION INTRAVENOUS at 03:39

## 2025-01-08 RX ADMIN — DULOXETINE HYDROCHLORIDE 30 MG: 30 CAPSULE, DELAYED RELEASE ORAL at 14:01

## 2025-01-08 RX ADMIN — MAGNESIUM SULFATE HEPTAHYDRATE 1000 MG: 1 INJECTION, SOLUTION INTRAVENOUS at 00:15

## 2025-01-08 RX ADMIN — METOPROLOL TARTRATE 5 MG: 5 INJECTION INTRAVENOUS at 04:51

## 2025-01-08 RX ADMIN — DILTIAZEM HYDROCHLORIDE 10 MG: 5 INJECTION, SOLUTION INTRAVENOUS at 02:27

## 2025-01-08 RX ADMIN — IPRATROPIUM BROMIDE AND ALBUTEROL SULFATE 1 DOSE: 2.5; .5 SOLUTION RESPIRATORY (INHALATION) at 21:45

## 2025-01-08 RX ADMIN — METOPROLOL TARTRATE 25 MG: 25 TABLET, FILM COATED ORAL at 10:28

## 2025-01-08 RX ADMIN — DILTIAZEM HYDROCHLORIDE 5 MG/HR: 5 INJECTION, SOLUTION INTRAVENOUS at 02:31

## 2025-01-08 RX ADMIN — CETIRIZINE HYDROCHLORIDE 10 MG: 10 TABLET, FILM COATED ORAL at 14:01

## 2025-01-08 RX ADMIN — ENOXAPARIN SODIUM 30 MG: 100 INJECTION SUBCUTANEOUS at 21:12

## 2025-01-08 RX ADMIN — OXYCODONE HYDROCHLORIDE 10 MG: 5 TABLET ORAL at 21:12

## 2025-01-08 RX ADMIN — Medication 10 ML: at 09:37

## 2025-01-08 RX ADMIN — Medication 5 MG: at 21:12

## 2025-01-08 RX ADMIN — IOPAMIDOL 75 ML: 755 INJECTION, SOLUTION INTRAVENOUS at 00:04

## 2025-01-08 RX ADMIN — DILTIAZEM HYDROCHLORIDE 240 MG: 240 CAPSULE, EXTENDED RELEASE ORAL at 10:28

## 2025-01-08 RX ADMIN — MELATONIN 1000 UNITS: at 14:02

## 2025-01-08 RX ADMIN — ATORVASTATIN CALCIUM 20 MG: 20 TABLET, FILM COATED ORAL at 21:12

## 2025-01-08 ASSESSMENT — ENCOUNTER SYMPTOMS: SHORTNESS OF BREATH: 1

## 2025-01-08 ASSESSMENT — PAIN DESCRIPTION - PAIN TYPE: TYPE: CHRONIC PAIN

## 2025-01-08 ASSESSMENT — PAIN DESCRIPTION - LOCATION: LOCATION: SHOULDER

## 2025-01-08 ASSESSMENT — PAIN DESCRIPTION - ORIENTATION: ORIENTATION: RIGHT;LEFT

## 2025-01-08 ASSESSMENT — PAIN DESCRIPTION - FREQUENCY: FREQUENCY: CONTINUOUS

## 2025-01-08 ASSESSMENT — PAIN DESCRIPTION - ONSET: ONSET: ON-GOING

## 2025-01-08 ASSESSMENT — PAIN SCALES - GENERAL: PAINLEVEL_OUTOF10: 8

## 2025-01-08 ASSESSMENT — PAIN - FUNCTIONAL ASSESSMENT: PAIN_FUNCTIONAL_ASSESSMENT: ACTIVITIES ARE NOT PREVENTED

## 2025-01-08 ASSESSMENT — PAIN DESCRIPTION - DESCRIPTORS: DESCRIPTORS: ACHING

## 2025-01-08 NOTE — PROGRESS NOTES
Patient and daughter oriented to unit policies and procedures including: pain management practices, unit safety precautions, family rapid response, q4h vital signs and assessments, daily 4am lab draws, weekly chest x-rays,daily chlorhexidine bathing, standing transfusion orders, and routine central line care.  Also discussed use of call light and how to get in touch with nursing staff.  Stressed the importance of calling out immediately for any changes in condition including but not limited to: pain, chills, fever, nausea, vomiting, diarrhea, chest pain, sob/vicente, assistance with toileting, bleeding, or any other symptoms that are out of the ordinary for the patient.  Patient verbalizes understanding of all instructions and will call for assistance as needed.

## 2025-01-08 NOTE — PROGRESS NOTES
4 Eyes Skin Assessment     NAME:  Ricardo Campbell  YOB: 1960  MEDICAL RECORD NUMBER:  [id number]    The patient is being assessed for  Admission    I agree that at least one RN has performed a thorough Head to Toe Skin Assessment on the patient. ALL assessment sites listed below have been assessed.      Areas assessed by both nurses:    Head, Face, Ears, Shoulders, Back, Chest, Arms, Elbows, Hands, Sacrum. Buttock, Coccyx, Ischium, Legs. Feet and Heels, Under Medical Devices , and Other          Does the Patient have a Wound? No noted wound(s)       Yvon Prevention initiated by RN: No  Wound Care Orders initiated by RN: No    Pressure Injury (Stage 3,4, Unstageable, DTI, NWPT, and Complex wounds) if present, place Wound referral order by RN under : No    New Ostomies, if present place, Ostomy referral order under : No     Nurse 1 eSignature: Electronically signed by Emmy Mccray RN on 1/8/25 at 7:34 AM EST    **SHARE this note so that the co-signing nurse can place an eSignature**    Nurse 2 eSignature: {Esignature:[id number]}

## 2025-01-08 NOTE — ED NOTES
Patient Name: Ricardo Campbell  : 1960 64 y.o.  MRN: [id number]  ED Room #: A01/A01-01     Chief complaint:   Chief Complaint   Patient presents with    Shortness of Breath     Pt presents to the ED due to SOB that started after chemo today. Pt state this is his 3rd round and he hasn't felt like this.      Hospital Problem/Diagnosis:       O2 Flow Rate:O2 Device: None (Room air)   (if applicable)  Cardiac Rhythm:   (if applicable)  Active LDA's:   Peripheral IV 25 Left Antecubital (Active)   Site Assessment Clean, dry & intact 25 2242       Peripheral IV 25 Left;Posterior Hand (Active)   Site Assessment Clean, dry & intact 25 2245            How does patient ambulate? Stand by assist    2. How does patient take pills? Whole with Water    3. Is patient alert? Alert    4. Is patient oriented? To Person, To Place, To Time, To Situation, and Follows Commands    5.   Patient arrived from:  home  Facility Name: ___________________________________________    6. If patient is disoriented or from a Skill Nursing Facility has family been notified of admission? No    7. Patient belongings? Belongings: Clothing    Disposition of belongings? Kept with Patient     8. Any specific patient or family belongings/needs/dynamics?   a.     9. Miscellaneous comments/pending orders?  a.      If there are any additional questions please reach out to the Emergency Department.      Lexis Quinones RN  [phone]

## 2025-01-08 NOTE — CONSULTS
Cardiology Consultation                                                                    Pt Name: Ricardo Campbell  Age: 64 y.o.  Sex: male  : 1960  Location: [facility]7/3517-01    Referring Physician: Elvis Machado  Primary cardiologist: Rohith Jacobs MD      Reason for Consult:     Reason for Consultation: A-Fib w/RVR    HPI:      Ricardo Campbell is a 64 y.o. male with a past medical history of small cell lung cancer (w/mets to both lungs), MS, hypertension, hyperlipidemia, PE, no longer on Eliquis after an intracranial hemorrhage a few months ago who presents to the emergency department for evaluation (25).  After he had chemotherapy (), his third round, in the clinic, he was hypertensive.  Since leaving the clinic, his blood pressure has gradually dropped and was 90s over 60s and a temp of 99.5 at home (per daughter, Renee).  Daughter states that after chemo he was advised to come to the ED if he had an elevated temp, for evaluation.  In the ED, he complained of mild SOB that was essentially no worse than baseline.  He reported chronic productive cough that was unchanged.  Denied twila hemoptysis.  leg pain, swelling or asymmetry, N/V or abdominal pain.     Currently (), he states SOB occurs at rest, as now, and do not feel like he can complete his sentences. States he did not take his meds yesterday () because he was getting his immunotherapy. He endorses significant throat pain, chest palpitations, headache 6/10, & blurry vision (more in the Left than Right -- when closes Right eye, can see everything clearly, but when closes Left eye, only sees \"a blur w/o shape,\" fatigue and loss of appetite. Denies chest pain, abd pain, N/V. States his last BM was yesterday ().States he last ate the evening of . Denies sick contacts.    Histories     Past Medical History:   has a past medical history of Hypercholesteremia, Hypertension, MS (multiple sclerosis) (HCC), and Small cell

## 2025-01-08 NOTE — PLAN OF CARE
Problem: Pain  Goal: Verbalizes/displays adequate comfort level or baseline comfort level  1/8/2025 1255 by Padmini Bonds, RN  Outcome: Progressing  Flowsheets (Taken 1/8/2025 1255)  Verbalizes/displays adequate comfort level or baseline comfort level:   Encourage patient to monitor pain and request assistance   Assess pain using appropriate pain scale   Implement non-pharmacological measures as appropriate and evaluate response  Note: Patient with no complaints of pain so far this shift.      Problem: Safety - Adult  Goal: Free from fall injury  1/8/2025 1255 by Padmini Bonds, RN  Outcome: Progressing  Flowsheets (Taken 1/8/2025 1255)  Free From Fall Injury: Instruct family/caregiver on patient safety  Note: Pt does not meet criteria for orthostasis.  Pt is a High fall risk. See Katz Fall Score and ABCDS Injury Risk assessments.   + Screening for Orthostasis and/or + High Fall Risk per KATZ/ABCDS: Explained fall risk precautions to pt and family and rationale behind their use to keep the patient safe. Pt bed is in low position, side rails up, call light and belongings are in reach. Fall wristband applied and present on pts wrist.  Bed alarm on.  Pt encouraged to call for assistance. Will continue with hourly rounds for PO intake, pain needs, toileting and repositioning as needed.      Problem: ABCDS Injury Assessment  Goal: Absence of physical injury  1/8/2025 1255 by Padmini Bonds, RN  Outcome: Progressing  Flowsheets (Taken 1/8/2025 1255)  Absence of Physical Injury: Implement safety measures based on patient assessment

## 2025-01-08 NOTE — ED PROVIDER NOTES
anticoagulated after intracranial hemorrhage several months ago.  Will obtain laboratory studies, CTPA.  His second blood pressure was 80s over 60s.  He was written for a liter of IV fluids.     Laboratory studies reveal leukocytosis to 16.8.  This, in combination with low-grade temp and transient low BP triggered us to add a lactate which was reassuring at 1.9. Blood cultures were ordered, but sepsis level fluids were withheld given lack of lactic elevation.  His troponin was 26, then 25, consistent with recent baseline.  COVID and flu were negative.  His magnesium was low at 1.53 and he was given 1 g magnesium rider.  My attending recommended checking LDH and uric acid levels as a screening for tumor lysis syndrome. These were 620 and 2.5 respectively - not convincing for tumor lysis syndrome.     The patients CTPA showed no evidence of PE, did show left mediastinal hilar mass and underlying mild diffuse bronchial wall thickening and geographic areas of lucency and groundglass opacity in the lungs compatible with underlying airways infection or inflammation.  He has no worsening shortness of breath or new productive cough.  His temp has been elevated as high as 99.6 in the emergency department.  I spoke with Dr. Gordon, of [facility], who recommended admission for observation given transient hypotension and tachycardia.  He thought that his VS abnormalities were likely side effect of his chemotherapy rather than suggestive of infectious process or sepsis.  He recommended withholding antibiotics at this point.  If the patient spikes a true fever of 100.4 he recommended starting the patient on broad-spectrum antibiotics.  If his blood pressure drops, he will likely need sepsis level fluids and dexamethasone 8 mg per Dr. Otero's.    I went back to communicate the recommendation for admission with the patient and family.  They are comfortable with this plan.  He was noted to be tachycardic between 140 and 160 at that time with  monitor showing A-fib with RVR.  He has a history of this.  He takes Cardizem.  His repeat EKG shows atrial fibrillation at a rate of 153.  He will be started on diltiazem bolus of 10 mg and a drip of 5 mg.  If we achieve rate control with this, will likely be able to go to telemetry bed, if not, will likely need titratable drip requiring PCU level of care.    At this time I am going off service and will be signing out care of the patient to my colleague Dr. Villafana for further care. Response to dilt bolus and drip, admit to hospital (level of care pending) is/are still pending. Oncoming provider responsibilities include following up on pending labs/tests, reassessing patient, and appropriate disposition. Please see medical record for further management and medical decision making.    The patient was evaluated by myself and the ED Attending Physician, Dr. Villafana. All management and disposition plans were discussed and agreed upon.     Clinical Impression     1. Atrial fibrillation with RVR (HCC)    2. Small cell carcinoma of lung, unspecified laterality, unspecified part of lung (HCC)        Disposition     admit        GARRET Mc - CNP, CNP  Department of Emergency Medicine     Shazia Castorena APRN - CNP  01/08/25 7486

## 2025-01-08 NOTE — ED PROVIDER NOTES
ED Attending Attestation Note     Date of evaluation: 1/7/2025    This patient was seen by the advance practice provider.  I have seen and examined the patient, agree with the workup, evaluation, management and diagnosis. The care plan has been discussed.  I have reviewed the ECG and concur with the CARLITO's interpretation.  My assessment reveals 64-year-old male with history of small cell lung cancer, hypertension, hyperlipidemia, pulmonary embolism though not currently on anticoagulation due to an ICH, and multiple sclerosis presents for shortness of breath and generalized fatigue.  Patient had chemotherapy earlier today and when discharged was feeling in his normal state of health.  He was noted throughout his time at home to become more dyspneic and was noted to be hypotensive and borderline febrile at home so family brought him to the [facility] for evaluation.  On arrival, patient's initial blood pressure was 129 systolic and he was tachycardic at 126, but was noted on the monitor to have a bigeminy on his electrical heart rate but only transmission of the first beat based on his pulse oximeter and palpating his pulse.  He has clear breath sounds and normal heart sounds.  He has a soft abdomen on exam.  Laboratory studies are significant for a white blood cell count of 16.8 but normal lactate of 1.9.  Renal panel is at the patient's baseline.  Magnesium was low 1.5 and the patient was given a gram of magnesium IV.  Troponins are stable at 26 and repeat is 25.  EKG initially showed a bigeminal rhythm but while the patient was in the [facility] it he was noted to go into atrial fibrillation with rapid ventricular response.  His heart rate with this is in the 140s to 150s.  He was placed on a diltiazem drip and given 2 boluses of diltiazem as well as a bolus of metoprolol and this has brought his heart rate down into the 100s to 110s.  Patient was transiently borderline hypotensive with systolic blood  pressures in the 80s but maps in the 60s and this did respond to IV fluids.  CTPA was obtained that shows no evidence of pulmonary embolism but does show worsening of his tumor burden as well as scattered groundglass opacities that are inflammatory versus infectious in nature.  Oncology was consulted and spoke with Dr. Gordon who asked that the patient be admitted for his transient hypotension and his borderline fever.  He stated that the initial vital sign abnormalities of hypotension and borderline fever are likely a result of the chemotherapy and less likely to be due to an infection.  He recommended holding on any antibiotics at this time unless the patient spikes a temperature greater than 100.4 and also recommended to the patient became hypotensive to give 8 mg of dexamethasone and IV fluids.    Critical Care:  Due to the immediate potential for life-threatening deterioration due to atrial fibrillation with rapid ventricular response, transient hypotension, and borderline fever, I spent 40 minutes providing critical care.  This time excludes time spent performing procedures but includes time spent on direct patient care, history retrieval, review of the chart, and discussions with patient, family, and consultant(s).       Erwin Villafana MD  01/08/25 0511

## 2025-01-08 NOTE — CARE COORDINATION
10:49am: Case Management Assessment  Initial Evaluation    Date/Time of Evaluation: 1/8/2025 10:49 AM  Assessment Completed by: BRENDA Mclean   for [facility] ([facility])  [facility]: [phone]    If patient is discharged prior to next notation, then this note serves as note for discharge by case management.    Patient Name: Ricardo Campbell                   YOB: 1960  Diagnosis: Atrial fibrillation with rapid ventricular response (HCC) [I48.91]  Atrial fibrillation with RVR (HCC) [I48.91]  Small cell carcinoma of lung, unspecified laterality, unspecified part of lung (HCC) [C34.90]                   Date / Time: 1/7/2025 10:30 PM    Patient Admission Status: Inpatient   Readmission Risk (Low < 19, Mod (19-27), High > 27): Readmission Risk Score: 11.7    Current PCP: No primary care provider on file.  PCP verified by CM? Yes ([place])    Chart Reviewed: Yes      History Provided by: Patient  Patient Orientation: Alert and Oriented    Patient Cognition: Alert    Hospitalization in the last 30 days (Readmission):  Yes    If yes, Readmission Assessment in CM Navigator will be completed.    Advance Directives:      Code Status: DNR-CC   Patient's Primary Decision Maker is:  (pt and franchesca said his wife Gloria is healthcare POA; requested copies)      Discharge Planning:    Patient lives with: Spouse/Significant Other Type of Home: House  Primary Care Giver: Self  Patient Support Systems include: Spouse/Significant Other, Children   Current Financial resources: [place], [place]  Current community resources: None  Current services prior to admission: Durable Medical Equipment, Other (Comment) ([facility], [facility]iative care [facility] and [facility] for 9hrs a week)            Current DME: Walker, Cane, Other (Comment) (electric scooter)            Type of Home Care services:  None    ADLS  Prior functional level:

## 2025-01-08 NOTE — H&P
History and Physical    Admit Date: 1/7/2025    Patient's PCP: Dr. Raymond primary care provider on file.      Chief complaint: Shortness of Breath       HISTORY OF PRESENT ILLNESS:    This is a very pleasant 64 y.o. male with a history of multiple medical problems presenting with extensive stage Small Cell Lung carcinoma, MS, hypertension, hyperlipidemia, and PE  who presented to the ED 1/7/25 with dyspnea, fatigue, low blood pressure.    He was originally diagnosed in July 2024 and had a brain metastasis successfully treated with radiation at that time. He was then treated with carboplatinum/-16 but was not given immunotherapy.  He did relatively well, but has had a quick relapse.   He was admitted 12/23/2024 - 12/25/24. He was admitted for cycle #1, day #1 Imdeltra.  During that admission, he developed chest pain with elevated troponin and tachycardia for which cardiology was consulted.  Patient improved on beta-blockers and antihypertensives. At that time, elevated high-sensitivity troponins, tachycardia, was felt suggestive of CRS grade 2 for which she was treated with steroids with significant improvement. He was admitted again 12/30/2024 - 12/31/24 following cycle #1, day 8 Imdeltra for observation,,and discharged home.  He is followed by Dr. Waterhouse and has been treated with Imdeltra, received cycle #1, day #15 as an outpatient day prior to current admission and says he did not feel good during and after his treatment and began to feel worse at night at home and developed SOB, and was also noted his BP gradually dropped  90s over 60s at home with temp 99.5. His daughter, who is an NP at [employer], brought him to the ED.     He has a history of PE, no longer on Eliquis after an intracranial hemorrhage a few months ago. His shortness of breath is not significantly worse than baseline.  He has chronic productive cough that is unchanged.  Denies hemoptysis.  Denies leg pain, swelling.  Denies nausea or vomiting.  bronchodilators while hospitalized        Patient is DNR CC        The patient and / or the family were informed of the results of any tests, a time was given to answer questions, a plan was proposed and they agreed with plan.    Thank you Dr. Raymond primary care provider on file. for the opportunity to be involved in this patients care. If you have any questions or concerns please feel free to contact me at [phone].  DNR-CC    Parth Gregg MD

## 2025-01-08 NOTE — PROGRESS NOTES
4 Eyes Skin Assessment     NAME:  Ricardo Campbell  YOB: 1960  MEDICAL RECORD NUMBER:  [id number]    The patient is being assessed for  Shift Handoff    I agree that at least one RN has performed a thorough Head to Toe Skin Assessment on the patient. ALL assessment sites listed below have been assessed.      Areas assessed by both nurses:    Head, Face, Ears, Shoulders, Back, Chest, Arms, Elbows, Hands, Sacrum. Buttock, Coccyx, Ischium, Legs. Feet and Heels, and Under Medical Devices         Does the Patient have a Wound? No noted wound(s)       Yvon Prevention initiated by RN: No  Wound Care Orders initiated by RN: No    Pressure Injury (Stage 3,4, Unstageable, DTI, NWPT, and Complex wounds) if present, place Wound referral order by RN under : No    New Ostomies, if present place, Ostomy referral order under : No     Nurse 1 eSignature: Electronically signed by Emmy Mccray RN on 1/8/25 at 7:33 AM EST    **SHARE this note so that the co-signing nurse can place an eSignature**    Nurse 2 eSignature: Electronically signed by Padmini Bonds RN on 1/8/25 at 9:01 AM EST

## 2025-01-08 NOTE — CONSULTS
Oncology [facility]    Consult Note      Requesting Physician:  Shazia Neves     CHIEF COMPLAINT:  SOB       HISTORY OF PRESENT ILLNESS:    Mr. Campbell  is a 64 y.o. male we are seeing in consultation for Extensive Stage Small Cell Lung Cancer. He is followed by Dr. Waterhouse and has been treated with Imdeltra, received cycle #1, day #15 as an outpatient yesterday.     He tells me that he did not feel good during and after his treatment yesterday. He states he wishes he would have been admitted following treatment rather than undergoing observation period in the office. He began to feel worse last night at home and developed SOB. His daughter, who is a NP at [employer] [facility] [employer] [facility], brought him to the ED when he was found to have a T99.     Today he is seen sitting in the bedside chair. Afib with RVR on the monitor. He is feeling fair. Struggling with his mental health--has questions about whether or not to continue treatment. Wondering if its worth it to undergo treatment if it will leave him feeling so poorly.          Past Medical History:  Past Medical History:   Diagnosis Date    Hypercholesteremia     Hypertension     MS (multiple sclerosis) (HCC)     Small cell carcinoma metastatic to both lungs (HCC)        Past Surgical History:  Past Surgical History:   Procedure Laterality Date    EYE SURGERY         Current Medications:  Current Facility-Administered Medications   Medication Dose Route Frequency Provider Last Rate Last Admin    sodium chloride flush 0.9 % injection 5-40 mL  5-40 mL IntraVENous 2 times per day Elvis Machado MD   10 mL at 01/08/25 0937    sodium chloride flush 0.9 % injection 5-40 mL  5-40 mL IntraVENous PRN Elvis Machado MD        0.9 % sodium chloride infusion   IntraVENous PRN Elvis Machado MD        potassium chloride (KLOR-CON M) extended release tablet 40 mEq

## 2025-01-09 ENCOUNTER — APPOINTMENT (OUTPATIENT)
Age: 65
End: 2025-01-09
Payer: OTHER GOVERNMENT

## 2025-01-09 VITALS
RESPIRATION RATE: 19 BRPM | BODY MASS INDEX: 32.37 KG/M2 | WEIGHT: 239 LBS | TEMPERATURE: 97.9 F | HEART RATE: 88 BPM | SYSTOLIC BLOOD PRESSURE: 108 MMHG | HEIGHT: 72 IN | OXYGEN SATURATION: 97 % | DIASTOLIC BLOOD PRESSURE: 72 MMHG

## 2025-01-09 LAB
ANION GAP SERPL CALCULATED.3IONS-SCNC: 13 MMOL/L (ref 3–16)
BASOPHILS # BLD: 0.1 K/UL (ref 0–0.2)
BASOPHILS NFR BLD: 1.2 %
BUN SERPL-MCNC: 12 MG/DL (ref 7–20)
CALCIUM SERPL-MCNC: 8.7 MG/DL (ref 8.3–10.6)
CHLORIDE SERPL-SCNC: 100 MMOL/L (ref 99–110)
CO2 SERPL-SCNC: 23 MMOL/L (ref 21–32)
CREAT SERPL-MCNC: 0.7 MG/DL (ref 0.8–1.3)
DEPRECATED RDW RBC AUTO: 13.6 % (ref 12.4–15.4)
ECHO AO ASC DIAM: 3.7 CM
ECHO AO ASCENDING AORTA INDEX: 1.61 CM/M2
ECHO AO ROOT DIAM: 3.4 CM
ECHO AO ROOT INDEX: 1.48 CM/M2
ECHO AV AREA PEAK VELOCITY: 2.5 CM2
ECHO AV AREA/BSA PEAK VELOCITY: 1.1 CM2/M2
ECHO AV PEAK GRADIENT: 10 MMHG
ECHO AV PEAK VELOCITY: 1.6 M/S
ECHO AV VELOCITY RATIO: 0.69
ECHO BSA: 2.35 M2
ECHO IVC INSP: 0.2 CM
ECHO IVC PROX: 1.8 CM
ECHO LA AREA 2C: 20.6 CM2
ECHO LA AREA 4C: 23.1 CM2
ECHO LA MAJOR AXIS: 6.8 CM
ECHO LA MINOR AXIS: 6.2 CM
ECHO LA VOL BP: 63 ML (ref 18–58)
ECHO LA VOL MOD A2C: 56 ML (ref 18–58)
ECHO LA VOL MOD A4C: 65 ML (ref 18–58)
ECHO LA VOL/BSA BIPLANE: 27 ML/M2 (ref 16–34)
ECHO LA VOLUME INDEX MOD A2C: 24 ML/M2 (ref 16–34)
ECHO LA VOLUME INDEX MOD A4C: 28 ML/M2 (ref 16–34)
ECHO LV E' LATERAL VELOCITY: 10.8 CM/S
ECHO LV E' SEPTAL VELOCITY: 9.11 CM/S
ECHO LV EDV A2C: 131 ML
ECHO LV EDV A4C: 143 ML
ECHO LV EDV INDEX A4C: 62 ML/M2
ECHO LV EDV NDEX A2C: 57 ML/M2
ECHO LV EF PHYSICIAN: 55 %
ECHO LV EJECTION FRACTION A2C: 56 %
ECHO LV EJECTION FRACTION A4C: 59 %
ECHO LV EJECTION FRACTION BIPLANE: 57 % (ref 55–100)
ECHO LV ESV A2C: 59 ML
ECHO LV ESV A4C: 59 ML
ECHO LV ESV INDEX A2C: 26 ML/M2
ECHO LV ESV INDEX A4C: 26 ML/M2
ECHO LV FRACTIONAL SHORTENING: 30 % (ref 28–44)
ECHO LV INTERNAL DIMENSION DIASTOLE INDEX: 2 CM/M2
ECHO LV INTERNAL DIMENSION DIASTOLIC: 4.6 CM (ref 4.2–5.9)
ECHO LV INTERNAL DIMENSION SYSTOLIC INDEX: 1.39 CM/M2
ECHO LV INTERNAL DIMENSION SYSTOLIC: 3.2 CM
ECHO LV IVSD: 1.1 CM (ref 0.6–1)
ECHO LV MASS 2D: 169.9 G (ref 88–224)
ECHO LV MASS INDEX 2D: 73.8 G/M2 (ref 49–115)
ECHO LV POSTERIOR WALL DIASTOLIC: 1 CM (ref 0.6–1)
ECHO LV RELATIVE WALL THICKNESS RATIO: 0.43
ECHO LVOT AREA: 3.5 CM2
ECHO LVOT DIAM: 2.1 CM
ECHO LVOT MEAN GRADIENT: 2 MMHG
ECHO LVOT PEAK GRADIENT: 5 MMHG
ECHO LVOT PEAK VELOCITY: 1.1 M/S
ECHO LVOT STROKE VOLUME INDEX: 29.2 ML/M2
ECHO LVOT SV: 67.2 ML
ECHO LVOT VTI: 19.4 CM
ECHO MV A VELOCITY: 1.09 M/S
ECHO MV E DECELERATION TIME (DT): 196 MS
ECHO MV E VELOCITY: 0.97 M/S
ECHO MV E/A RATIO: 0.89
ECHO MV E/E' LATERAL: 8.98
ECHO MV E/E' RATIO (AVERAGED): 9.81
ECHO MV E/E' SEPTAL: 10.65
ECHO PV MAX VELOCITY: 1.1 M/S
ECHO PV PEAK GRADIENT: 5 MMHG
ECHO RA AREA 4C: 15.7 CM2
ECHO RA END SYSTOLIC VOLUME APICAL 4 CHAMBER INDEX BSA: 17 ML/M2
ECHO RA VOLUME: 39 ML
ECHO RV BASAL DIMENSION: 4.1 CM
ECHO RV FREE WALL PEAK S': 13.3 CM/S
ECHO RV MID DIMENSION: 2.4 CM
ECHO RV TAPSE: 2 CM (ref 1.7–?)
EOSINOPHIL # BLD: 0.1 K/UL (ref 0–0.6)
EOSINOPHIL NFR BLD: 1.4 %
GFR SERPLBLD CREATININE-BSD FMLA CKD-EPI: >90 ML/MIN/{1.73_M2}
GLUCOSE SERPL-MCNC: 126 MG/DL (ref 70–99)
HCT VFR BLD AUTO: 36.4 % (ref 40.5–52.5)
HGB BLD-MCNC: 12.3 G/DL (ref 13.5–17.5)
LYMPHOCYTES # BLD: 2.4 K/UL (ref 1–5.1)
LYMPHOCYTES NFR BLD: 25.9 %
MAGNESIUM SERPL-MCNC: 1.84 MG/DL (ref 1.8–2.4)
MCH RBC QN AUTO: 32.9 PG (ref 26–34)
MCHC RBC AUTO-ENTMCNC: 33.7 G/DL (ref 31–36)
MCV RBC AUTO: 97.5 FL (ref 80–100)
MONOCYTES # BLD: 0.8 K/UL (ref 0–1.3)
MONOCYTES NFR BLD: 8.7 %
NEUTROPHILS # BLD: 5.8 K/UL (ref 1.7–7.7)
NEUTROPHILS NFR BLD: 62.8 %
PLATELET # BLD AUTO: 271 K/UL (ref 135–450)
PMV BLD AUTO: 8.4 FL (ref 5–10.5)
POTASSIUM SERPL-SCNC: 3.2 MMOL/L (ref 3.5–5.1)
RBC # BLD AUTO: 3.74 M/UL (ref 4.2–5.9)
SODIUM SERPL-SCNC: 136 MMOL/L (ref 136–145)
WBC # BLD AUTO: 9.2 K/UL (ref 4–11)

## 2025-01-09 PROCEDURE — 6360000004 HC RX CONTRAST MEDICATION: Performed by: INTERNAL MEDICINE

## 2025-01-09 PROCEDURE — 83735 ASSAY OF MAGNESIUM: CPT

## 2025-01-09 PROCEDURE — 6370000000 HC RX 637 (ALT 250 FOR IP): Performed by: INTERNAL MEDICINE

## 2025-01-09 PROCEDURE — 94680 O2 UPTK RST&XERS DIR SIMPLE: CPT

## 2025-01-09 PROCEDURE — 94660 CPAP INITIATION&MGMT: CPT

## 2025-01-09 PROCEDURE — C8929 TTE W OR WO FOL WCON,DOPPLER: HCPCS

## 2025-01-09 PROCEDURE — 2500000003 HC RX 250 WO HCPCS: Performed by: INTERNAL MEDICINE

## 2025-01-09 PROCEDURE — 2700000000 HC OXYGEN THERAPY PER DAY

## 2025-01-09 PROCEDURE — 6360000002 HC RX W HCPCS: Performed by: INTERNAL MEDICINE

## 2025-01-09 PROCEDURE — 36415 COLL VENOUS BLD VENIPUNCTURE: CPT

## 2025-01-09 PROCEDURE — 80048 BASIC METABOLIC PNL TOTAL CA: CPT

## 2025-01-09 PROCEDURE — 94761 N-INVAS EAR/PLS OXIMETRY MLT: CPT

## 2025-01-09 PROCEDURE — 85025 COMPLETE CBC W/AUTO DIFF WBC: CPT

## 2025-01-09 RX ADMIN — SULFUR HEXAFLUORIDE 2 ML: 60.7; .19; .19 INJECTION, POWDER, LYOPHILIZED, FOR SUSPENSION INTRAVENOUS; INTRAVESICAL at 10:37

## 2025-01-09 RX ADMIN — Medication 5 ML: at 09:09

## 2025-01-09 RX ADMIN — MELATONIN 1000 UNITS: at 09:10

## 2025-01-09 RX ADMIN — PANTOPRAZOLE SODIUM 40 MG: 40 TABLET, DELAYED RELEASE ORAL at 09:10

## 2025-01-09 RX ADMIN — ENOXAPARIN SODIUM 30 MG: 100 INJECTION SUBCUTANEOUS at 09:09

## 2025-01-09 RX ADMIN — POTASSIUM CHLORIDE 40 MEQ: 1500 TABLET, EXTENDED RELEASE ORAL at 09:10

## 2025-01-09 RX ADMIN — DILTIAZEM HYDROCHLORIDE 240 MG: 240 CAPSULE, EXTENDED RELEASE ORAL at 09:06

## 2025-01-09 RX ADMIN — METOPROLOL TARTRATE 25 MG: 25 TABLET, FILM COATED ORAL at 09:09

## 2025-01-09 RX ADMIN — OXYCODONE HYDROCHLORIDE 10 MG: 5 TABLET ORAL at 09:12

## 2025-01-09 RX ADMIN — DULOXETINE HYDROCHLORIDE 30 MG: 30 CAPSULE, DELAYED RELEASE ORAL at 09:07

## 2025-01-09 RX ADMIN — CETIRIZINE HYDROCHLORIDE 10 MG: 10 TABLET, FILM COATED ORAL at 09:06

## 2025-01-09 ASSESSMENT — PAIN SCALES - GENERAL
PAINLEVEL_OUTOF10: 0
PAINLEVEL_OUTOF10: 7

## 2025-01-09 ASSESSMENT — PAIN DESCRIPTION - PAIN TYPE: TYPE: CHRONIC PAIN

## 2025-01-09 ASSESSMENT — PAIN DESCRIPTION - FREQUENCY: FREQUENCY: CONTINUOUS

## 2025-01-09 ASSESSMENT — PAIN DESCRIPTION - ONSET: ONSET: ON-GOING

## 2025-01-09 ASSESSMENT — PAIN DESCRIPTION - ORIENTATION: ORIENTATION: LOWER

## 2025-01-09 ASSESSMENT — PAIN DESCRIPTION - LOCATION: LOCATION: BACK

## 2025-01-09 ASSESSMENT — PAIN - FUNCTIONAL ASSESSMENT: PAIN_FUNCTIONAL_ASSESSMENT: ACTIVITIES ARE NOT PREVENTED

## 2025-01-09 ASSESSMENT — PAIN DESCRIPTION - DESCRIPTORS: DESCRIPTORS: ACHING

## 2025-01-09 NOTE — PLAN OF CARE
Problem: Pain  Goal: Verbalizes/displays adequate comfort level or baseline comfort level  1/9/2025 1304 by Padmini Bonds, RN  Outcome: Progressing  Flowsheets (Taken 1/9/2025 1304)  Verbalizes/displays adequate comfort level or baseline comfort level:   Encourage patient to monitor pain and request assistance   Assess pain using appropriate pain scale   Implement non-pharmacological measures as appropriate and evaluate response  Note: Patient with chronic pain well controlled with PRN pain medications. See MAR for administration times.      Problem: Safety - Adult  Goal: Free from fall injury  1/9/2025 1304 by Padmini Bonds, RN  Outcome: Progressing  Flowsheets (Taken 1/9/2025 1304)  Free From Fall Injury: Instruct family/caregiver on patient safety  Note: Orthostatic vital signs obtained at start of shift - see flowsheet for details.  is a High fall risk. See Katz Fall Score and ABCDS Injury Risk assessments.   + Screening for Orthostasis and/or + High Fall Risk per KATZ/ABCDS: Explained fall risk precautions to pt and family and rationale behind their use to keep the patient safe. Pt bed is in low position, side rails up, call light and belongings are in reach. Fall wristband applied and present on pts wrist.  Chair Alarm on.  Pt encouraged to call for assistance. Will continue with hourly rounds for PO intake, pain needs, toileting and repositioning as needed.      Problem: Metabolic/Fluid and Electrolytes - Adult  Goal: Electrolytes maintained within normal limits  1/9/2025 1304 by Padmini Bonds, RN  Outcome: Progressing  Flowsheets (Taken 1/9/2025 1304)  Electrolytes maintained within normal limits:   Monitor labs and assess patient for signs and symptoms of electrolyte imbalances   Administer electrolyte replacement as ordered  Note: Patient received potassium replacement today per PRN orders.

## 2025-01-09 NOTE — PROGRESS NOTES
Reviewed discharge instructions with patient and  family members.  Reviewed discharge medications including dosing, schedule, indication, and adverse reactions.  Reviewed which medications were already taken today and next dosage due for each medication.      Reviewed signs and symptoms that prompt a call to the physician and appropriate phone numbers.     Patient verbalized understanding of all instructions and questions were answered to his. satisfaction.  Signed discharge instructions were given to the patient and a copy placed in the paper-lite chart.  Patient discharged to home per wheelchair with family members.      Padmini Bonds RN

## 2025-01-09 NOTE — PROGRESS NOTES
ONCOLOGY HEMATOLOGY CARE PROGRESS NOTE      SUBJECTIVE:      Patient is doing significantly better, resting comfortably, on CPAP.  Heart rate less than 100.  Patient remains afebrile.  Denies any chest pain , palpitations    ROS:     Same as above    OBJECTIVE        Physical    VITALS:  Patient Vitals for the past 24 hrs:   BP Temp Temp src Pulse Resp SpO2   01/09/25 0512 -- -- -- 96 19 99 %   01/09/25 0334 (!) 118/59 98.5 °F (36.9 °C) Oral 95 20 100 %   01/09/25 0127 -- -- -- 92 19 99 %   01/08/25 2357 (!) 117/57 98.6 °F (37 °C) Oral 93 16 99 %   01/08/25 2145 -- -- -- (!) 102 18 96 %   01/08/25 2142 -- -- -- -- 14 --   01/08/25 2112 -- -- -- -- 25 --   01/08/25 2057 117/60 98.9 °F (37.2 °C) Oral 99 21 94 %   01/08/25 1515 116/70 98.5 °F (36.9 °C) Oral 75 24 99 %   01/08/25 1436 -- -- -- 92 23 --   01/08/25 1245 107/75 -- -- (!) 104 21 --   01/08/25 1135 103/76 98.5 °F (36.9 °C) Oral (!) 122 17 100 %   01/08/25 1034 (!) 109/96 -- -- -- -- --   01/08/25 0856 126/69 -- -- -- -- --   01/08/25 0854 (!) 132/119 98.5 °F (36.9 °C) Oral (!) 117 18 98 %       24HR INTAKE/OUTPUT:    Intake/Output Summary (Last 24 hours) at 1/9/2025 0722  Last data filed at 1/8/2025 1400  Gross per 24 hour   Intake 400 ml   Output --   Net 400 ml       CONSTITUTIONAL: awake, alert, cooperative, no apparent distress   EYES: pupils equal, round and reactive to light, sclera clear and conjunctiva normal  ENT: Normocephalic, without obvious abnormality, atraumatic.  Hoarse voice  NECK: supple, symmetrical, no jugular venous distension and no carotid bruits   HEMATOLOGIC/LYMPHATIC: no cervical, supraclavicular or axillary lymphadenopathy   LUNGS: no increased work of breathing and clear to auscultation   CARDIOVASCULAR: regular rate and rhythm, normal S1 and S2, no murmur noted  ABDOMEN: normal bowel sounds x 4, soft, non-distended, non-tender, no masses palpated, no hepatosplenomgaly   MUSCULOSKELETAL:  tone  Enlarging left mediastinal left hilar mass with progressive mass effect upon the main pulmonary artery and left main pulmonary artery and progressive mass effect upon the left upper lobe bronchus.  3. Enlarging lymph nodes in the left AP window region and left lateral aortic recess.  4. Underlying mild diffuse bronchial wall thickening and geographic areas of lucency and groundglass opacity in the lungs compatible with underlying airways infection or inflammation.    Electronically signed by Deep Sellers MD      Problem List  Patient Active Problem List   Diagnosis    Small cell lung cancer in adult (HCC)    Atrial fibrillation with rapid ventricular response (HCC)       ASSESSMENT AND PLAN:    Extensive small cell lung carcinoma:  -Initial diagnosis July 2024  -T4,N2,M1B  -Brain metastasis  -Progression based on CT scan 11/20/2024  - restaging CT CAP done 12/16/24  -Patient is currently admitted for observation after cycle 1 day 15 Imdeltra.  - attempted MRI Brain but could not complete due to metal fragments in his eye.    - he was seen by Dr. Waterhouse in the [facility] office on 1/7/25 and received Cycle #1, day # 15 Imdeltra  -No evidence of CRS/CANS so far      Sinus tachycardia, A-fib  -Possibly reactive.  -No acute ST or T wave changes  -Blood cultures-negative so far  -Heart rate is less than 100 now.  Patient remains clinically asymptomatic.  -  -History of brain metastasis, hemorrhage  -History of pulmonary embolism  -Not on any anticoagulation at present  CTA did not reveal any evidence of PE     Multiple sclerosis:  -He is in a scooter. He has only been in the scooter for several months but had been using a cane for a much longer period of time     Pain  He will continue Oxycodone and Morphine ER     Type 2 diabetes:  -Currently on insulin  -We do not expect that tarlatamab will have negative effect on his diabetes.     Depression:  -This appears to be stable     Elevated cholesterol:  -Will likely not

## 2025-01-09 NOTE — PLAN OF CARE
Problem: Pain  Goal: Verbalizes/displays adequate comfort level or baseline comfort level  Outcome: Progressing  Flowsheets (Taken 1/9/2025 0529)  Verbalizes/displays adequate comfort level or baseline comfort level:   Encourage patient to monitor pain and request assistance   Assess pain using appropriate pain scale   Administer analgesics based on type and severity of pain and evaluate response   Implement non-pharmacological measures as appropriate and evaluate response     Problem: Safety - Adult  Goal: Free from fall injury  Outcome: Progressing  Flowsheets (Taken 1/9/2025 0529)  Free From Fall Injury: Instruct family/caregiver on patient safety     Problem: Metabolic/Fluid and Electrolytes - Adult  Goal: Electrolytes maintained within normal limits  Outcome: Progressing  Flowsheets (Taken 1/9/2025 0529)  Electrolytes maintained within normal limits:   Monitor labs and assess patient for signs and symptoms of electrolyte imbalances   Administer electrolyte replacement as ordered   Monitor response to electrolyte replacements, including repeat lab results as appropriate      verbal instruction

## 2025-01-09 NOTE — PROGRESS NOTES
01/09/25 1323   Resting (Room Air)   SpO2 93   HR 88   Resting (On O2)   SpO2 96   HR 86   O2 Device Nasal cannula   O2 Flow Rate (l/min) 2 l/min   During Walk (Room Air)   SpO2 92   HR 84   Walk/Assistance Device Ambulation   Rate of Dyspnea 1   Symptoms Fatigue   During Walk (On O2)   Need Additional O2 Flow Rate Rows No   Walk/Assistance Device Ambulation   Rate of Dyspnea 0   Symptoms Fatigue   After Walk   SpO2 96   HR 80   Rate of Dyspnea 1   Does the Patient Qualify for Home O2 No   Does the Patient Need Portable Oxygen Tanks No

## 2025-01-09 NOTE — CARE COORDINATION
Case Management Assessment            Discharge Note                    Date / Time of Note: 1/9/2025 1:49 PM                  Discharge Note Completed by: BRENDA Mclean   for [facility] [place] ([facility])  [employer] Mobile: [phone]    Patient Name: Ricardo Campbell   YOB: 1960  Diagnosis: Atrial fibrillation with rapid ventricular response (HCC) [I48.91]  Atrial fibrillation with RVR (HCC) [I48.91]  Small cell carcinoma of lung, unspecified laterality, unspecified part of lung (HCC) [C34.90]   Date / Time: 1/7/2025 10:30 PM    Current PCP: No primary care provider on file.  Clinic patient: No    Hospitalization in the last 30 days: No  Readmission Assessment  Number of Days since last admission?: 8-30 days  Previous Disposition: Home with Family  Who is being Interviewed: Patient, Caregiver  What was the patient's/caregiver's perception as to why they think they needed to return back to the hospital?: Other (Comment) (shortness of breath)  Did you visit your Primary Care Physician after you left the hospital, before you returned this time?: No  Why weren't you able to visit your PCP?: Did not have an appointment  Did you see a specialist, such as Cardiac, Pulmonary, Orthopedic Physician, etc. after you left the hospital?: Yes  Who advised the patient to return to the hospital?: Self-referral, Caregiver, Physician  Does the patient report anything that got in the way of taking their medications?: No  In our efforts to provide the best possible care to you and others like you, can you think of anything that we could have done to help you after you left the hospital the first time, so that you might not have needed to return so soon?: Other (Comment) (nothing additional per pt)    Advance Directives:  Code Status: DNR-CC    Financial:  Payor: [employer] / Plan: [employer] OPTUM / Product Type: *No Product type* /      Pharmacy:    [id number] - [place], [place] - [address] [phone] -  [phone]  [address]  [place] [address]  Phone: [phone] Fax: [phone]      Assistance purchasing medications?: Potential Assistance Purchasing Medications: No  Assistance provided by Case Management: None at this time    Does patient want to participate in local refill/ meds to beds program?:      Meds To Beds General Rules:  1. Can ONLY be done Monday- Friday between 8:30am-5pm  2. Prescription(s) must be in pharmacy by 3pm to be filled same day  3.Copy of patient's insurance/ prescription drug card and patient face sheet must be sent along with the prescription(s)  4. Cost of Rx cannot be added to hospital bill. If financial assistance is needed, please contact unit  or ;  or  CANNOT provide pharmacy voucher for patients co-pays  5. Patients can then  the prescription on their way out of the hospital at discharge, or pharmacy can deliver to the bedside if staff is available. (payment due at time of pick-up or delivery - cash, check, or card accepted)     Able to afford home medications/ co-pay costs: Yes    ADLS:  Current PT AM-PAC Score:   /24  Current OT AM-PAC Score:   /24    DISCHARGE Disposition: Home- No Services Needed    IMM Completed: earlier and pt was in agreement with dc home    Transportation:  Transportation PLAN for discharge: family   Mode of Transport: Private Car    Home Care:  Home Care ordered at discharge: Not Indicated    Home Oxygen and Respiratory Equipment:  Oxygen needed at discharge?: Not Indicated    Additional CM Notes: Pt discharging home today with his wife with no additional needs. He didn't qualify for home o2 which Margie Bearden liaison was aware of as she was on the unit. Pt [facility] and [facility]. See prior note from writer on this date for additional information.     MD aware via perfect

## 2025-01-09 NOTE — DISCHARGE SUMMARY
1.829 m (6')   Wt 108.8 kg (239 lb 14.4 oz)   SpO2 100%   BMI 32.54 kg/m²     No results for input(s): \"POCGLU\" in the last 72 hours.  General appearance: alert, appears stated age, and cooperative  Head: Normocephalic, without obvious abnormality, atraumatic  Neck: no adenopathy, no carotid bruit, no JVD, supple, symmetrical, trachea midline, and thyroid not enlarged, symmetric, no tenderness/mass/nodules  Lungs: clear to auscultation bilaterally , although BS diminished at bases.  Abdomen: soft, non-tender; bowel sounds normal; no masses,  no organomegaly  Extremities: extremities normal, atraumatic, no cyanosis or edema  Pulses: 2+ and symmetric  Skin: Skin color, texture, turgor normal. No rashes or lesions  Neurologic: Grossly normal    LABS:  Recent Labs     01/09/25  0325   WBC 9.2   HGB 12.3*         Recent Labs     01/09/25  0325      K 3.2*      CO2 23   BUN 12   CREATININE 0.7*   GLUCOSE 126*     No results for input(s): \"INR\" in the last 72 hours.      Discharge Medications:  Current Discharge Medication List             Details   dilTIAZem (CARDIZEM CD) 240 MG extended release capsule Take 1 capsule by mouth daily  Qty: 30 capsule, Refills: 3      nitroGLYCERIN (NITROSTAT) 0.4 MG SL tablet Place 1 tablet under the tongue every 5 minutes as needed for Chest pain up to max of 3 total doses. If no relief after 1 dose, call 911.  Qty: 1 tablet, Refills: 0      fluticasone (FLONASE) 50 MCG/ACT nasal spray 1 spray by Each Nostril route daily as needed for Rhinitis      atorvastatin (LIPITOR) 20 MG tablet Take 1 tablet by mouth at bedtime      benzonatate (TESSALON) 100 MG capsule Take 1 capsule by mouth 3 times daily as needed for Cough      DULoxetine (CYMBALTA) 30 MG extended release capsule Take 1 capsule by mouth daily      oxyCODONE (ROXICODONE) 5 MG immediate release tablet Take 2 tablets by mouth every 6 hours as needed for Pain.      omeprazole (PRILOSEC) 20 MG delayed release  capsule Take 1 capsule by mouth daily      olodaterol (STRIVERDI RESPIMAT) 2.5 MCG/ACT inhaler Inhale 2 puffs into the lungs daily      loratadine (CLARITIN) 10 MG tablet Take 1 tablet by mouth daily      vitamin D (CHOLECALCIFEROL) 1000 UNIT TABS tablet Take 1 tablet by mouth daily      metoprolol tartrate (LOPRESSOR) 25 MG tablet Take 1 tablet by mouth 2 times daily  Qty: 60 tablet, Refills: 3      albuterol sulfate HFA (PROVENTIL HFA) 108 (90 BASE) MCG/ACT inhaler Inhale 2 puffs into the lungs every 4 hours as needed for Wheezing or Shortness of Breath With spacer (and mask if indicated). Thanks.  Qty: 1 Inhaler, Refills: 1           Activity: activity as tolerated  Diet: regular diet  Wound Care: none needed    Disposition: home  Discharged Condition: Stable  Follow Up: Primary Care Physician in one week    Total time spent on discharge, finalizing medications, referrals and arranging outpatient follow up was more than 31 mins    Thank you Dr. Raymond primary care provider on file. for the opportunity to be involved in this patients care. If you have any questions or concerns please feel free to contact me at [phone].

## 2025-01-09 NOTE — PROGRESS NOTES
EMR was reviewed.  Echo is pending.  Telemetry personally reviewed, AF with controlled ventricular response.    Assessment and plan.  1.  Persistent AF now with RVR.  Could be due to lack of medications, possible underlying infection/sepsis.  Rate is not controlled home medications  2.  Rule out healthcare associated pneumonia with sepsis.  3.  Metastatic lung cancer currently on Imdelltra, per [facility].   4.  Intracranial hemorrhage from metastasis after XRT.  5.  History of PE currently not on anticoagulation due to ICH.           - Continue Lopressor 25 p.o. twice daily and diltiazem  mg daily.   - No anticoagulation due to recent intracranial hemorrhage.  - Will obtain an echocardiogram today.   - Normal saline bolus as needed SBP less than 100-110 mmHg.  - Workup for possible sepsis underway.  Symptoms could be sequela of immunotherapy.  Patient is currently not on IV antibiotics.      If echo unremarkable, patient may be discharged to home (if ok with primary team) today on the above meds and follow up with me in 1 week with a BMP prior to visit. Please call me with any questions. If cardiology needs to be re-consulted during this admission, please contact our Cardiology Navigator at [phone] during normal business hours or the [facility] cardiology office/ at [phone] after hours or during the weekends/holidays.        I would like to thank you for providing me the opportunity to participate in the care of your patient. If you have any questions, please do not hesitate to contact me.     Rohith Jacobs MD, [facility], [facility]  [facility] [address]  [address] [place] [address]  Ph: [phone]  Fax: [phone]

## 2025-01-09 NOTE — CARE COORDINATION
Addendum at 1:48pm: Pt didn't qualify for home o2. Spoke with pt and dt franchesca at bedside. Updated on the above and explained unable to get o2 through Medicare or the [facility] since he didn't qualify, unless he wanted to pay which he declined. His dt said she will talk with palliative care about the hospital bed as it would be self pay from the hospital and unsure if palliative care will cover the bed. They denied all other needs from writer.     Addendum at 1:05pm: Discussed with Dr. Gregg via phone call. RN aware if pt doesn't qualify for o2, then he would have to pay for o2 which would discuss    Addendum at 11:13am: Discharge order noted. Spoke with pt at bedside. Discussed the pending o2 eval and the [facility] vs medicare again. He is now stating he wants to use his medicare for the [facility] as it would be quicker and request [facility]. His medicare number is [id number]. Discussed home nursing services and he declined at this time stating \"it's not necessary\". He is aware he can get it set up through his PCP or [facility] if needed once home. He said he would have a ride home once everything was arranged.     Updated RN and Charge RN.     Referral called to Rena Hanson ([phone]) who states will work on o2 once eval is in. She was given [facility] number.    10:26am: Spoke with pt bedside this AM after speaking with RN. Pt aware that the hospital bed that his daughter inquired about would be self pay since there is not a recommendation for. He stated understanding.     Also discussed potential home o2 and inquired if he wanted to go through the [facility]. He said if possible but doesn't want to delay his discharge. He is aware that the [facility] is closed today because of President Sly's passing. He said if not able to get it through the [facility], then he would want [facility] as his wife has o2 through them. He said [facility] saw the [facility] last [facility] and [facility].     Message sent to MD via [employer] to order a home o2 eval. RN aware.

## 2025-01-12 LAB
BACTERIA BLD CULT ORG #2: NORMAL
BACTERIA BLD CULT: NORMAL

## 2025-01-24 ENCOUNTER — OFFICE VISIT (OUTPATIENT)
Dept: CARDIOLOGY CLINIC | Age: 65
End: 2025-01-24
Payer: MEDICARE

## 2025-01-24 VITALS
WEIGHT: 243 LBS | HEART RATE: 88 BPM | BODY MASS INDEX: 32.96 KG/M2 | DIASTOLIC BLOOD PRESSURE: 80 MMHG | SYSTOLIC BLOOD PRESSURE: 120 MMHG

## 2025-01-24 DIAGNOSIS — C34.90 SMALL CELL LUNG CANCER IN ADULT (HCC): Primary | ICD-10-CM

## 2025-01-24 DIAGNOSIS — I48.91 ATRIAL FIBRILLATION WITH RAPID VENTRICULAR RESPONSE (HCC): ICD-10-CM

## 2025-01-24 PROCEDURE — 3017F COLORECTAL CA SCREEN DOC REV: CPT | Performed by: NURSE PRACTITIONER

## 2025-01-24 PROCEDURE — 99215 OFFICE O/P EST HI 40 MIN: CPT | Performed by: NURSE PRACTITIONER

## 2025-01-24 PROCEDURE — 1124F ACP DISCUSS-NO DSCNMKR DOCD: CPT | Performed by: NURSE PRACTITIONER

## 2025-01-24 PROCEDURE — 1111F DSCHRG MED/CURRENT MED MERGE: CPT | Performed by: NURSE PRACTITIONER

## 2025-01-24 PROCEDURE — G8427 DOCREV CUR MEDS BY ELIG CLIN: HCPCS | Performed by: NURSE PRACTITIONER

## 2025-01-24 PROCEDURE — G8417 CALC BMI ABV UP PARAM F/U: HCPCS | Performed by: NURSE PRACTITIONER

## 2025-01-24 PROCEDURE — 93000 ELECTROCARDIOGRAM COMPLETE: CPT | Performed by: NURSE PRACTITIONER

## 2025-01-24 PROCEDURE — 1036F TOBACCO NON-USER: CPT | Performed by: NURSE PRACTITIONER

## 2025-01-24 NOTE — PROGRESS NOTES
[facility]     Outpatient Follow Up Note  Pt of Dr Jacobs when he was in hospital    [facility] pt     CHIEF COMPLAINT / HPI:  Follow Up after hospital visit afib RVR   Ricardo Campbell is 65 y.o. male who presents today with small cell lung cancer (w/mets to both lungs), MS, HTN HLD,  remote PE, no longer on Eliquis after an intracranial hemorrhage a few months ago who presents to the emergency department for evaluation (1/7/25)   He had TTE 1/25 he refuses an ischemic workup     Interval history:  VSS w stable, c/o usual SOB with lung ca, to 02 nasal canula today (at home)  uses CPA at HS        Past Medical History:   Diagnosis Date    Hypercholesteremia     Hypertension     MS (multiple sclerosis) (HCC)     Small cell carcinoma metastatic to both lungs (HCC)      Social History:    Social History     Tobacco Use   Smoking Status Former    Current packs/day: 2.00    Types: Cigarettes   Smokeless Tobacco Never     Current Medications:  Current Outpatient Medications   Medication Sig Dispense Refill    metoprolol tartrate (LOPRESSOR) 25 MG tablet Take 1 tablet by mouth 2 times daily 60 tablet 3    dilTIAZem (CARDIZEM CD) 240 MG extended release capsule Take 1 capsule by mouth daily 30 capsule 3    nitroGLYCERIN (NITROSTAT) 0.4 MG SL tablet Place 1 tablet under the tongue every 5 minutes as needed for Chest pain up to max of 3 total doses. If no relief after 1 dose, call 911. 1 tablet 0    fluticasone (FLONASE) 50 MCG/ACT nasal spray 1 spray by Each Nostril route daily as needed for Rhinitis      atorvastatin (LIPITOR) 20 MG tablet Take 1 tablet by mouth at bedtime      benzonatate (TESSALON) 100 MG capsule Take 1 capsule by mouth 3 times daily as needed for Cough      DULoxetine (CYMBALTA) 30 MG extended release capsule Take 1 capsule by mouth daily      oxyCODONE (ROXICODONE) 5 MG immediate release tablet Take 2 tablets by mouth every 6 hours as needed for Pain.      omeprazole (PRILOSEC) 20 MG delayed

## 2025-01-30 ENCOUNTER — HOSPITAL ENCOUNTER (INPATIENT)
Age: 65
LOS: 3 days | Discharge: HOME | DRG: 309 | End: 2025-02-03
Attending: STUDENT IN AN ORGANIZED HEALTH CARE EDUCATION/TRAINING PROGRAM | Admitting: HOSPITALIST
Payer: OTHER GOVERNMENT

## 2025-01-30 ENCOUNTER — APPOINTMENT (OUTPATIENT)
Dept: CT IMAGING | Age: 65
DRG: 309 | End: 2025-01-30
Payer: OTHER GOVERNMENT

## 2025-01-30 ENCOUNTER — APPOINTMENT (OUTPATIENT)
Dept: GENERAL RADIOLOGY | Age: 65
DRG: 309 | End: 2025-01-30
Payer: OTHER GOVERNMENT

## 2025-01-30 DIAGNOSIS — C34.90 NON-SMALL CELL LUNG CANCER WITH METASTASIS (HCC): ICD-10-CM

## 2025-01-30 DIAGNOSIS — I48.91 ATRIAL FIBRILLATION WITH RAPID VENTRICULAR RESPONSE (HCC): Primary | ICD-10-CM

## 2025-01-30 LAB
ANION GAP SERPL CALCULATED.3IONS-SCNC: 13 MMOL/L (ref 3–16)
BASE EXCESS BLDV CALC-SCNC: 3.5 MMOL/L (ref -2–3)
BASE EXCESS BLDV CALC-SCNC: 4.9 MMOL/L (ref -2–3)
BASOPHILS # BLD: 0.1 K/UL (ref 0–0.2)
BASOPHILS NFR BLD: 2 %
BUN SERPL-MCNC: 8 MG/DL (ref 7–20)
CALCIUM SERPL-MCNC: 9.2 MG/DL (ref 8.3–10.6)
CHLORIDE SERPL-SCNC: 101 MMOL/L (ref 99–110)
CO2 BLDV-SCNC: 30 MMOL/L
CO2 BLDV-SCNC: 32 MMOL/L
CO2 SERPL-SCNC: 23 MMOL/L (ref 21–32)
COHGB MFR BLDV: 1.2 % (ref 0–1.5)
COHGB MFR BLDV: 1.2 % (ref 0–1.5)
CREAT SERPL-MCNC: 0.7 MG/DL (ref 0.8–1.3)
DEPRECATED RDW RBC AUTO: 14.1 % (ref 12.4–15.4)
DO-HGB MFR BLDV: 24 %
DO-HGB MFR BLDV: 43.4 %
EKG DIAGNOSIS: NORMAL
EKG Q-T INTERVAL: 304 MS
EKG QRS DURATION: 74 MS
EKG QTC CALCULATION (BAZETT): 440 MS
EKG R AXIS: 80 DEGREES
EKG T AXIS: -28 DEGREES
EKG VENTRICULAR RATE: 126 BPM
EOSINOPHIL # BLD: 0.2 K/UL (ref 0–0.6)
EOSINOPHIL NFR BLD: 2.9 %
FLUAV RNA RESP QL NAA+PROBE: NOT DETECTED
FLUBV RNA RESP QL NAA+PROBE: NOT DETECTED
GFR SERPLBLD CREATININE-BSD FMLA CKD-EPI: >90 ML/MIN/{1.73_M2}
GLUCOSE SERPL-MCNC: 128 MG/DL (ref 70–99)
HCO3 BLDV-SCNC: 28.3 MMOL/L (ref 24–28)
HCO3 BLDV-SCNC: 30.4 MMOL/L (ref 24–28)
HCT VFR BLD AUTO: 38.5 % (ref 40.5–52.5)
HGB BLD-MCNC: 12.8 G/DL (ref 13.5–17.5)
INR PPP: 0.98 (ref 0.85–1.15)
LACTATE BLDV-SCNC: 1.4 MMOL/L (ref 0.4–2)
LYMPHOCYTES # BLD: 2.4 K/UL (ref 1–5.1)
LYMPHOCYTES NFR BLD: 40.6 %
MAGNESIUM SERPL-MCNC: 1.55 MG/DL (ref 1.8–2.4)
MCH RBC QN AUTO: 31.9 PG (ref 26–34)
MCHC RBC AUTO-ENTMCNC: 33.3 G/DL (ref 31–36)
MCV RBC AUTO: 95.9 FL (ref 80–100)
METHGB MFR BLDV: 0.3 % (ref 0–1.5)
METHGB MFR BLDV: 0.4 % (ref 0–1.5)
MONOCYTES # BLD: 0.9 K/UL (ref 0–1.3)
MONOCYTES NFR BLD: 15.3 %
NEUTROPHILS # BLD: 2.3 K/UL (ref 1.7–7.7)
NEUTROPHILS NFR BLD: 39.2 %
NT-PROBNP SERPL-MCNC: 485 PG/ML (ref 0–124)
PCO2 BLDV: 42.8 MMHG (ref 41–51)
PCO2 BLDV: 47.1 MMHG (ref 41–51)
PH BLDV: 7.42 [PH] (ref 7.35–7.45)
PH BLDV: 7.43 [PH] (ref 7.35–7.45)
PLATELET # BLD AUTO: 319 K/UL (ref 135–450)
PMV BLD AUTO: 7.6 FL (ref 5–10.5)
PO2 BLDV: 31.6 MMHG (ref 25–40)
PO2 BLDV: 42.2 MMHG (ref 25–40)
POTASSIUM SERPL-SCNC: 3.8 MMOL/L (ref 3.5–5.1)
PROTHROMBIN TIME: 13.2 SEC (ref 11.9–14.9)
RBC # BLD AUTO: 4.01 M/UL (ref 4.2–5.9)
SAO2 % BLDV: 56 %
SAO2 % BLDV: 76 %
SARS-COV-2 RNA RESP QL NAA+PROBE: NOT DETECTED
SODIUM SERPL-SCNC: 137 MMOL/L (ref 136–145)
TROPONIN, HIGH SENSITIVITY: 22 NG/L (ref 0–22)
TROPONIN, HIGH SENSITIVITY: 31 NG/L (ref 0–22)
WBC # BLD AUTO: 5.9 K/UL (ref 4–11)

## 2025-01-30 PROCEDURE — 83605 ASSAY OF LACTIC ACID: CPT

## 2025-01-30 PROCEDURE — 96365 THER/PROPH/DIAG IV INF INIT: CPT

## 2025-01-30 PROCEDURE — 6360000004 HC RX CONTRAST MEDICATION

## 2025-01-30 PROCEDURE — 71046 X-RAY EXAM CHEST 2 VIEWS: CPT

## 2025-01-30 PROCEDURE — 93005 ELECTROCARDIOGRAM TRACING: CPT | Performed by: STUDENT IN AN ORGANIZED HEALTH CARE EDUCATION/TRAINING PROGRAM

## 2025-01-30 PROCEDURE — G0378 HOSPITAL OBSERVATION PER HR: HCPCS

## 2025-01-30 PROCEDURE — 6360000002 HC RX W HCPCS

## 2025-01-30 PROCEDURE — 93005 ELECTROCARDIOGRAM TRACING: CPT

## 2025-01-30 PROCEDURE — 85610 PROTHROMBIN TIME: CPT

## 2025-01-30 PROCEDURE — 96366 THER/PROPH/DIAG IV INF ADDON: CPT

## 2025-01-30 PROCEDURE — 87636 SARSCOV2 & INF A&B AMP PRB: CPT

## 2025-01-30 PROCEDURE — 84484 ASSAY OF TROPONIN QUANT: CPT

## 2025-01-30 PROCEDURE — 71260 CT THORAX DX C+: CPT

## 2025-01-30 PROCEDURE — 83880 ASSAY OF NATRIURETIC PEPTIDE: CPT

## 2025-01-30 PROCEDURE — 2500000003 HC RX 250 WO HCPCS

## 2025-01-30 PROCEDURE — 96368 THER/DIAG CONCURRENT INF: CPT

## 2025-01-30 PROCEDURE — 96375 TX/PRO/DX INJ NEW DRUG ADDON: CPT

## 2025-01-30 PROCEDURE — 99285 EMERGENCY DEPT VISIT HI MDM: CPT

## 2025-01-30 PROCEDURE — 2580000003 HC RX 258

## 2025-01-30 PROCEDURE — 82803 BLOOD GASES ANY COMBINATION: CPT

## 2025-01-30 PROCEDURE — 96372 THER/PROPH/DIAG INJ SC/IM: CPT

## 2025-01-30 PROCEDURE — 96376 TX/PRO/DX INJ SAME DRUG ADON: CPT

## 2025-01-30 PROCEDURE — 85025 COMPLETE CBC W/AUTO DIFF WBC: CPT

## 2025-01-30 PROCEDURE — 83735 ASSAY OF MAGNESIUM: CPT

## 2025-01-30 PROCEDURE — 80048 BASIC METABOLIC PNL TOTAL CA: CPT

## 2025-01-30 RX ORDER — POLYETHYLENE GLYCOL 3350 17 G/17G
17 POWDER, FOR SOLUTION ORAL DAILY PRN
Status: DISCONTINUED | OUTPATIENT
Start: 2025-01-30 | End: 2025-02-03 | Stop reason: HOSPADM

## 2025-01-30 RX ORDER — DULOXETIN HYDROCHLORIDE 30 MG/1
30 CAPSULE, DELAYED RELEASE ORAL DAILY
Status: DISCONTINUED | OUTPATIENT
Start: 2025-01-30 | End: 2025-02-03 | Stop reason: HOSPADM

## 2025-01-30 RX ORDER — MAGNESIUM SULFATE IN WATER 40 MG/ML
2000 INJECTION, SOLUTION INTRAVENOUS ONCE
Status: COMPLETED | OUTPATIENT
Start: 2025-01-30 | End: 2025-01-30

## 2025-01-30 RX ORDER — SODIUM CHLORIDE 0.9 % (FLUSH) 0.9 %
5-40 SYRINGE (ML) INJECTION PRN
Status: DISCONTINUED | OUTPATIENT
Start: 2025-01-30 | End: 2025-02-03 | Stop reason: HOSPADM

## 2025-01-30 RX ORDER — ONDANSETRON 4 MG/1
4 TABLET, ORALLY DISINTEGRATING ORAL EVERY 8 HOURS PRN
Status: DISCONTINUED | OUTPATIENT
Start: 2025-01-30 | End: 2025-01-31

## 2025-01-30 RX ORDER — PANTOPRAZOLE SODIUM 40 MG/10ML
40 INJECTION, POWDER, LYOPHILIZED, FOR SOLUTION INTRAVENOUS DAILY
Status: DISCONTINUED | OUTPATIENT
Start: 2025-01-31 | End: 2025-02-03 | Stop reason: HOSPADM

## 2025-01-30 RX ORDER — PANTOPRAZOLE SODIUM 40 MG/1
40 TABLET, DELAYED RELEASE ORAL
Status: DISCONTINUED | OUTPATIENT
Start: 2025-01-31 | End: 2025-02-03 | Stop reason: HOSPADM

## 2025-01-30 RX ORDER — DILTIAZEM HYDROCHLORIDE 5 MG/ML
5 INJECTION INTRAVENOUS ONCE
Status: COMPLETED | OUTPATIENT
Start: 2025-01-30 | End: 2025-01-30

## 2025-01-30 RX ORDER — SODIUM CHLORIDE 9 MG/ML
INJECTION, SOLUTION INTRAVENOUS CONTINUOUS
Status: ACTIVE | OUTPATIENT
Start: 2025-01-30 | End: 2025-01-31

## 2025-01-30 RX ORDER — SODIUM CHLORIDE 9 MG/ML
INJECTION, SOLUTION INTRAVENOUS PRN
Status: DISCONTINUED | OUTPATIENT
Start: 2025-01-30 | End: 2025-02-03 | Stop reason: HOSPADM

## 2025-01-30 RX ORDER — OXYCODONE HYDROCHLORIDE 5 MG/1
10 TABLET ORAL EVERY 6 HOURS PRN
Status: DISCONTINUED | OUTPATIENT
Start: 2025-01-30 | End: 2025-02-03 | Stop reason: HOSPADM

## 2025-01-30 RX ORDER — ALBUTEROL SULFATE 0.83 MG/ML
2.5 SOLUTION RESPIRATORY (INHALATION) EVERY 4 HOURS PRN
Status: DISCONTINUED | OUTPATIENT
Start: 2025-01-30 | End: 2025-02-03 | Stop reason: HOSPADM

## 2025-01-30 RX ORDER — POTASSIUM CHLORIDE 7.45 MG/ML
10 INJECTION INTRAVENOUS PRN
Status: DISCONTINUED | OUTPATIENT
Start: 2025-01-30 | End: 2025-02-03 | Stop reason: HOSPADM

## 2025-01-30 RX ORDER — DILTIAZEM HYDROCHLORIDE 120 MG/1
240 CAPSULE, COATED, EXTENDED RELEASE ORAL DAILY
Status: DISCONTINUED | OUTPATIENT
Start: 2025-01-31 | End: 2025-01-31

## 2025-01-30 RX ORDER — VITAMIN B COMPLEX
1000 TABLET ORAL DAILY
Status: DISCONTINUED | OUTPATIENT
Start: 2025-01-30 | End: 2025-02-03 | Stop reason: HOSPADM

## 2025-01-30 RX ORDER — ACETAMINOPHEN 650 MG/1
650 SUPPOSITORY RECTAL EVERY 6 HOURS PRN
Status: DISCONTINUED | OUTPATIENT
Start: 2025-01-30 | End: 2025-02-03 | Stop reason: HOSPADM

## 2025-01-30 RX ORDER — BENZONATATE 100 MG/1
100 CAPSULE ORAL 3 TIMES DAILY PRN
Status: DISCONTINUED | OUTPATIENT
Start: 2025-01-30 | End: 2025-02-03 | Stop reason: HOSPADM

## 2025-01-30 RX ORDER — ATORVASTATIN CALCIUM 20 MG/1
20 TABLET, FILM COATED ORAL NIGHTLY
Status: DISCONTINUED | OUTPATIENT
Start: 2025-01-30 | End: 2025-02-03 | Stop reason: HOSPADM

## 2025-01-30 RX ORDER — POTASSIUM CHLORIDE 1500 MG/1
40 TABLET, EXTENDED RELEASE ORAL PRN
Status: DISCONTINUED | OUTPATIENT
Start: 2025-01-30 | End: 2025-02-03 | Stop reason: HOSPADM

## 2025-01-30 RX ORDER — DILTIAZEM HYDROCHLORIDE 5 MG/ML
10 INJECTION INTRAVENOUS ONCE
Status: COMPLETED | OUTPATIENT
Start: 2025-01-30 | End: 2025-01-30

## 2025-01-30 RX ORDER — FLUTICASONE PROPIONATE 50 MCG
1 SPRAY, SUSPENSION (ML) NASAL DAILY PRN
Status: DISCONTINUED | OUTPATIENT
Start: 2025-01-30 | End: 2025-02-03 | Stop reason: HOSPADM

## 2025-01-30 RX ORDER — SODIUM CHLORIDE 0.9 % (FLUSH) 0.9 %
5-40 SYRINGE (ML) INJECTION EVERY 12 HOURS SCHEDULED
Status: DISCONTINUED | OUTPATIENT
Start: 2025-01-30 | End: 2025-02-03 | Stop reason: HOSPADM

## 2025-01-30 RX ORDER — ACETAMINOPHEN 325 MG/1
650 TABLET ORAL EVERY 6 HOURS PRN
Status: DISCONTINUED | OUTPATIENT
Start: 2025-01-30 | End: 2025-02-03 | Stop reason: HOSPADM

## 2025-01-30 RX ORDER — ONDANSETRON 2 MG/ML
4 INJECTION INTRAMUSCULAR; INTRAVENOUS EVERY 6 HOURS PRN
Status: DISCONTINUED | OUTPATIENT
Start: 2025-01-30 | End: 2025-01-31

## 2025-01-30 RX ORDER — METOPROLOL TARTRATE 25 MG/1
25 TABLET, FILM COATED ORAL 2 TIMES DAILY
Status: DISCONTINUED | OUTPATIENT
Start: 2025-01-30 | End: 2025-02-03

## 2025-01-30 RX ORDER — DOCUSATE SODIUM 100 MG/1
200 CAPSULE, LIQUID FILLED ORAL DAILY
COMMUNITY

## 2025-01-30 RX ORDER — MAGNESIUM SULFATE IN WATER 40 MG/ML
2000 INJECTION, SOLUTION INTRAVENOUS PRN
Status: DISCONTINUED | OUTPATIENT
Start: 2025-01-30 | End: 2025-02-03 | Stop reason: HOSPADM

## 2025-01-30 RX ORDER — IOPAMIDOL 755 MG/ML
75 INJECTION, SOLUTION INTRAVASCULAR
Status: COMPLETED | OUTPATIENT
Start: 2025-01-30 | End: 2025-01-30

## 2025-01-30 RX ORDER — ENOXAPARIN SODIUM 100 MG/ML
30 INJECTION SUBCUTANEOUS 2 TIMES DAILY
Status: DISCONTINUED | OUTPATIENT
Start: 2025-01-30 | End: 2025-02-03 | Stop reason: HOSPADM

## 2025-01-30 RX ADMIN — SODIUM CHLORIDE: 9 INJECTION, SOLUTION INTRAVENOUS at 21:44

## 2025-01-30 RX ADMIN — ENOXAPARIN SODIUM 30 MG: 100 INJECTION SUBCUTANEOUS at 21:02

## 2025-01-30 RX ADMIN — DILTIAZEM HYDROCHLORIDE 5 MG/HR: 5 INJECTION, SOLUTION INTRAVENOUS at 15:33

## 2025-01-30 RX ADMIN — DILTIAZEM HYDROCHLORIDE 5 MG: 5 INJECTION, SOLUTION INTRAVENOUS at 15:29

## 2025-01-30 RX ADMIN — MAGNESIUM SULFATE HEPTAHYDRATE 2000 MG: 40 INJECTION, SOLUTION INTRAVENOUS at 13:34

## 2025-01-30 RX ADMIN — IOPAMIDOL 75 ML: 755 INJECTION, SOLUTION INTRAVENOUS at 13:49

## 2025-01-30 RX ADMIN — DILTIAZEM HYDROCHLORIDE 10 MG: 5 INJECTION, SOLUTION INTRAVENOUS at 13:30

## 2025-01-30 ASSESSMENT — LIFESTYLE VARIABLES
HOW MANY STANDARD DRINKS CONTAINING ALCOHOL DO YOU HAVE ON A TYPICAL DAY: PATIENT DOES NOT DRINK
HOW OFTEN DO YOU HAVE A DRINK CONTAINING ALCOHOL: NEVER

## 2025-01-30 ASSESSMENT — PAIN SCALES - GENERAL: PAINLEVEL_OUTOF10: 0

## 2025-01-30 NOTE — PROGRESS NOTES
Pt arrived to room 3514 in stable condition. HR 99  Lead V appears normal sinus. /79 (96). Dr. PATTERSON notified of patient's arrival.

## 2025-01-30 NOTE — H&P
being treated dysphagia for afib with RVR in the setting of a metastatic lung malignancy.     #Stage 4 small cell cancer s/p cisplatin and -16, brain metastasis s/p radiation  #Dysphagia and fatigue  #poor oral intake  Patient has increased fatigue and progressive dysphagia over the past week leading to an inability to eat .   He describes it as an inability to initiate a swallow for both liquids and solids. No pain involved.CT chest concerning for enlarging left hilar/mediastinal mass, which demonstrates interval increase in size when compared with the prior study of 1/8/2025. This results in mass effect upon the left hilum including the left mainstem bronchus and pulmonary artery.  -Obtain CT soft tissue of the neck w/wo contrast  - speech consulted   -possible need for barium swallow.  - GI consulted, appreciate recommendations  - Oncology consulted, appreciate recommendations  - IVF for 12 hours  - holding oral meds until evaluation      Afib with RVR  May be due to recent stress and dehydration.   -was given diltiazem and metoprolol for cardioversion. will continue to monitor  - continue home eliquis 5 mg BID  - cardiology consulted, appreciate recommendations  - continue IVF for 12 hours    Troponinemia  Has multiple prior troponins in the same range. Denies chest pain  - continue to monitor    Chronic conditions:  Multiple sclerosis  - uses a scooter  - PT/OT    Hx of PE  Not on anticoagulation due to previous ICH  - DVT ppx  - CTPA negative for PE    Hx of hyperglycemia  Home meds from a previous hospital discharge show lantus and sliding scale. However, patient does not use any regimen and mentions that he was only given that regimen temporarily  - monitor   - advance to sliding scale if needed    Code Status: Full Code  FEN: Diet NPO  PPX: lovenox  DISPO: F    Will discuss with attending physician Ifrah Littlejohn, *.   __________________  Chayo Conway, DO PGY-2  [facility]ning

## 2025-01-30 NOTE — ED PROVIDER NOTES
ED Attending Attestation Note     Date of evaluation: 1/30/2025    I have discussed the case with the resident. I have personally performed a history, physical exam, and my own medical decision making. I have reviewed the note and agree with the findings and plan.  I have reviewed the ECG and concur with the resident's interpretation.     INITIAL VITALS:  , Temp: 98.3 °F (36.8 °C), Pulse: (!) 141, Respirations: 25, SpO2: 96 %   Physical Exam  Vitals reviewed.   HENT:      Head: Normocephalic.   Eyes:      Pupils: Pupils are equal, round, and reactive to light.   Cardiovascular:      Rate and Rhythm: Tachycardia present. Rhythm irregular.   Pulmonary:      Effort: Pulmonary effort is normal.   Abdominal:      Palpations: Abdomen is soft.   Musculoskeletal:         General: Normal range of motion.      Cervical back: Normal range of motion.   Skin:     General: Skin is warm and dry.   Neurological:      General: No focal deficit present.      Mental Status: He is alert and oriented to person, place, and time.         MDM:  My assessment reveals a 65-year-old male with past medical history of metastatic lung cancer presenting with difficulty swallowing and decreased p.o. intake.  On evaluation, he is hemodynamically stable though profoundly tachycardic and in A-fib with RVR.  CT pulmonary angiogram obtained does not reveal any evidence of PE but does show an enlarging hilar mass.  I suspect his worsening mass is the etiology of his symptoms and his A-fib with RVR.  He was given a bolus of diltiazem and will be started on a drip and subsequently admitted for further management of his A-fib with RVR.    Critical Care:  Upon my evaluation, this patient had a high probability of imminent or life-threatening deterioration due to A-fib with RVR requiring diltiazem, which required my direct attention, intervention, and personal management.    I have personally provided 35 minutes of critical care time exclusive of time spent 
history is not on file.  He reports that he has quit smoking. His smoking use included cigarettes. He has never used smokeless tobacco. He reports current alcohol use. He reports that he does not use drugs.    Medications     Previous Medications    ALBUTEROL SULFATE HFA (PROVENTIL HFA) 108 (90 BASE) MCG/ACT INHALER    Inhale 2 puffs into the lungs every 4 hours as needed for Wheezing or Shortness of Breath With spacer (and mask if indicated). Thanks.    ATORVASTATIN (LIPITOR) 20 MG TABLET    Take 1 tablet by mouth at bedtime    BENZONATATE (TESSALON) 100 MG CAPSULE    Take 1 capsule by mouth 3 times daily as needed for Cough    DILTIAZEM (CARDIZEM CD) 240 MG EXTENDED RELEASE CAPSULE    Take 1 capsule by mouth daily    DULOXETINE (CYMBALTA) 30 MG EXTENDED RELEASE CAPSULE    Take 1 capsule by mouth daily    FLUTICASONE (FLONASE) 50 MCG/ACT NASAL SPRAY    1 spray by Each Nostril route daily as needed for Rhinitis    LORATADINE (CLARITIN) 10 MG TABLET    Take 1 tablet by mouth daily    METOPROLOL TARTRATE (LOPRESSOR) 25 MG TABLET    Take 1 tablet by mouth 2 times daily    NITROGLYCERIN (NITROSTAT) 0.4 MG SL TABLET    Place 1 tablet under the tongue every 5 minutes as needed for Chest pain up to max of 3 total doses. If no relief after 1 dose, call 911.    OLODATEROL (STRIVERDI RESPIMAT) 2.5 MCG/ACT INHALER    Inhale 2 puffs into the lungs daily    OMEPRAZOLE (PRILOSEC) 20 MG DELAYED RELEASE CAPSULE    Take 1 capsule by mouth daily    OXYCODONE (ROXICODONE) 5 MG IMMEDIATE RELEASE TABLET    Take 2 tablets by mouth every 6 hours as needed for Pain.    VITAMIN D (CHOLECALCIFEROL) 1000 UNIT TABS TABLET    Take 1 tablet by mouth daily       Allergies     He is allergic to bee venom and chloraseptic [phenol].    Physical Exam     GENERAL APPEARANCE: Chronically ill-appearing but in no acute distress  EYES: Lids/conjunctiva normal.  EARS/NOSE/THROAT: Mucous membranes moist, uvula midline, no oral pharyngeal erythema, exudate

## 2025-01-30 NOTE — PROGRESS NOTES
1511: Report received from SEGUN Gonzalez.  1537: Vitals stable, pt a/ox4. Skin assessment performed. Assessment performed.  1750: Message sent to Dr. PATTERSON about pt's arrival. Also clarifying diltiazem drip and PO diltiazem medication order being in at the same time. This RN asked Dr. PATTERSON about contacting cardiology for clarification of diltiazem orders, and if we can use the right chest port for normal saline administration   1806: called pharmacy for clarification of home med rec  1823: no reply from Dr. PATTERSON about message sent at 1750. Message remains unread.  1825: Cardiology consult placed by MD  1841. Pt failed swallow screen. Notified Dr. PATTERSON of baseline neuropathy in right hand and respiratory prompt/popup in epic asking about if MD wanted to place a consult to respiratory.  1906: Message sent to Dr. PATTERSON asking if she had been notified by the ED RN about results of the CT. Also notified MD about not getting this information in report from ED RN. MD was given the results of the CT. Stated she already saw the results.  1911: Received call from . Spoke with MD Gokul. RN gave results of CT chest. MD stated to make patient NPO at midnight, but RN informed him of already having strict NPO status currently. No further orders.      Nightshift RN was notified about normal saline not being started due to needing further clarification from MD

## 2025-01-30 NOTE — PROGRESS NOTES
4 Eyes Skin Assessment     NAME:  Ricardo Campbell  YOB: 1960  MEDICAL RECORD NUMBER:  [id number]    The patient is being assessed for  Admission    I agree that at least one RN has performed a thorough Head to Toe Skin Assessment on the patient. ALL assessment sites listed below have been assessed.      Areas assessed by both nurses:    Head, Face, Ears, Shoulders, Back, Chest, Arms, Elbows, Hands, Sacrum. Buttock, Coccyx, Ischium, Legs. Feet and Heels, and Under Medical Devices     Pt with dry, flaky skin. No abrasions noted.       Does the Patient have a Wound? No noted wound(s)       Yvon Prevention initiated by RN: Yes  Wound Care Orders initiated by RN: No    Pressure Injury (Stage 3,4, Unstageable, DTI, NWPT, and Complex wounds) if present, place Wound referral order by RN under : No    New Ostomies, if present place, Ostomy referral order under : No     Nurse 1 eSignature: Electronically signed by Angeles Smith RN on 1/30/25 at 6:36 PM EST    **SHARE this note so that the co-signing nurse can place an eSignature**    Nurse 2 eSignature: Electronically signed by Antoinette Booker RN on 1/30/25 at 6:45 PM EST

## 2025-01-31 ENCOUNTER — APPOINTMENT (OUTPATIENT)
Dept: GENERAL RADIOLOGY | Age: 65
DRG: 309 | End: 2025-01-31
Payer: OTHER GOVERNMENT

## 2025-01-31 ENCOUNTER — APPOINTMENT (OUTPATIENT)
Dept: CT IMAGING | Age: 65
DRG: 309 | End: 2025-01-31
Payer: OTHER GOVERNMENT

## 2025-01-31 PROBLEM — R11.2 NAUSEA & VOMITING: Status: ACTIVE | Noted: 2025-01-31

## 2025-01-31 PROBLEM — E44.0 MODERATE MALNUTRITION (HCC): Status: ACTIVE | Noted: 2025-01-31

## 2025-01-31 LAB
ANION GAP SERPL CALCULATED.3IONS-SCNC: 11 MMOL/L (ref 3–16)
BASOPHILS # BLD: 0 K/UL (ref 0–0.2)
BASOPHILS # BLD: 0.1 K/UL (ref 0–0.2)
BASOPHILS NFR BLD: 0.4 %
BASOPHILS NFR BLD: 1.2 %
BUN SERPL-MCNC: 7 MG/DL (ref 7–20)
CALCIUM SERPL-MCNC: 8.8 MG/DL (ref 8.3–10.6)
CHLORIDE SERPL-SCNC: 102 MMOL/L (ref 99–110)
CO2 SERPL-SCNC: 26 MMOL/L (ref 21–32)
CREAT SERPL-MCNC: 0.6 MG/DL (ref 0.8–1.3)
DEPRECATED RDW RBC AUTO: 13.8 % (ref 12.4–15.4)
DEPRECATED RDW RBC AUTO: 13.8 % (ref 12.4–15.4)
EOSINOPHIL # BLD: 0 K/UL (ref 0–0.6)
EOSINOPHIL # BLD: 0.2 K/UL (ref 0–0.6)
EOSINOPHIL NFR BLD: 0.2 %
EOSINOPHIL NFR BLD: 2.6 %
GFR SERPLBLD CREATININE-BSD FMLA CKD-EPI: >90 ML/MIN/{1.73_M2}
GLUCOSE SERPL-MCNC: 104 MG/DL (ref 70–99)
HCT VFR BLD AUTO: 34.8 % (ref 40.5–52.5)
HCT VFR BLD AUTO: 37.8 % (ref 40.5–52.5)
HGB BLD-MCNC: 11.8 G/DL (ref 13.5–17.5)
HGB BLD-MCNC: 12.5 G/DL (ref 13.5–17.5)
LYMPHOCYTES # BLD: 1.3 K/UL (ref 1–5.1)
LYMPHOCYTES # BLD: 2.3 K/UL (ref 1–5.1)
LYMPHOCYTES NFR BLD: 13.3 %
LYMPHOCYTES NFR BLD: 34.6 %
MAGNESIUM SERPL-MCNC: 1.87 MG/DL (ref 1.8–2.4)
MCH RBC QN AUTO: 31.6 PG (ref 26–34)
MCH RBC QN AUTO: 32.4 PG (ref 26–34)
MCHC RBC AUTO-ENTMCNC: 33.1 G/DL (ref 31–36)
MCHC RBC AUTO-ENTMCNC: 33.8 G/DL (ref 31–36)
MCV RBC AUTO: 95.3 FL (ref 80–100)
MCV RBC AUTO: 95.7 FL (ref 80–100)
MONOCYTES # BLD: 0.6 K/UL (ref 0–1.3)
MONOCYTES # BLD: 1 K/UL (ref 0–1.3)
MONOCYTES NFR BLD: 14.8 %
MONOCYTES NFR BLD: 5.7 %
NEUTROPHILS # BLD: 3.1 K/UL (ref 1.7–7.7)
NEUTROPHILS # BLD: 8 K/UL (ref 1.7–7.7)
NEUTROPHILS NFR BLD: 46.8 %
NEUTROPHILS NFR BLD: 80.4 %
PLATELET # BLD AUTO: 298 K/UL (ref 135–450)
PLATELET # BLD AUTO: 337 K/UL (ref 135–450)
PMV BLD AUTO: 7.2 FL (ref 5–10.5)
PMV BLD AUTO: 7.8 FL (ref 5–10.5)
POTASSIUM SERPL-SCNC: 3.4 MMOL/L (ref 3.5–5.1)
RBC # BLD AUTO: 3.63 M/UL (ref 4.2–5.9)
RBC # BLD AUTO: 3.97 M/UL (ref 4.2–5.9)
SODIUM SERPL-SCNC: 139 MMOL/L (ref 136–145)
WBC # BLD AUTO: 6.5 K/UL (ref 4–11)
WBC # BLD AUTO: 9.9 K/UL (ref 4–11)

## 2025-01-31 PROCEDURE — 6370000000 HC RX 637 (ALT 250 FOR IP)

## 2025-01-31 PROCEDURE — 96375 TX/PRO/DX INJ NEW DRUG ADDON: CPT

## 2025-01-31 PROCEDURE — 97535 SELF CARE MNGMENT TRAINING: CPT

## 2025-01-31 PROCEDURE — 82728 ASSAY OF FERRITIN: CPT

## 2025-01-31 PROCEDURE — 6360000002 HC RX W HCPCS

## 2025-01-31 PROCEDURE — 2580000003 HC RX 258

## 2025-01-31 PROCEDURE — 85025 COMPLETE CBC W/AUTO DIFF WBC: CPT

## 2025-01-31 PROCEDURE — 6360000004 HC RX CONTRAST MEDICATION

## 2025-01-31 PROCEDURE — 2580000003 HC RX 258: Performed by: HOSPITALIST

## 2025-01-31 PROCEDURE — 97166 OT EVAL MOD COMPLEX 45 MIN: CPT

## 2025-01-31 PROCEDURE — 6360000002 HC RX W HCPCS: Performed by: INTERNAL MEDICINE

## 2025-01-31 PROCEDURE — 36591 DRAW BLOOD OFF VENOUS DEVICE: CPT

## 2025-01-31 PROCEDURE — 6370000000 HC RX 637 (ALT 250 FOR IP): Performed by: INTERNAL MEDICINE

## 2025-01-31 PROCEDURE — 92610 EVALUATE SWALLOWING FUNCTION: CPT

## 2025-01-31 PROCEDURE — 36415 COLL VENOUS BLD VENIPUNCTURE: CPT

## 2025-01-31 PROCEDURE — 36592 COLLECT BLOOD FROM PICC: CPT

## 2025-01-31 PROCEDURE — 6370000000 HC RX 637 (ALT 250 FOR IP): Performed by: HOSPITALIST

## 2025-01-31 PROCEDURE — 83550 IRON BINDING TEST: CPT

## 2025-01-31 PROCEDURE — 96366 THER/PROPH/DIAG IV INF ADDON: CPT

## 2025-01-31 PROCEDURE — 2060000000 HC ICU INTERMEDIATE R&B

## 2025-01-31 PROCEDURE — 83735 ASSAY OF MAGNESIUM: CPT

## 2025-01-31 PROCEDURE — 70491 CT SOFT TISSUE NECK W/DYE: CPT

## 2025-01-31 PROCEDURE — 80048 BASIC METABOLIC PNL TOTAL CA: CPT

## 2025-01-31 PROCEDURE — 83540 ASSAY OF IRON: CPT

## 2025-01-31 PROCEDURE — 99223 1ST HOSP IP/OBS HIGH 75: CPT | Performed by: INTERNAL MEDICINE

## 2025-01-31 PROCEDURE — 97116 GAIT TRAINING THERAPY: CPT

## 2025-01-31 PROCEDURE — 97162 PT EVAL MOD COMPLEX 30 MIN: CPT

## 2025-01-31 PROCEDURE — 2500000003 HC RX 250 WO HCPCS

## 2025-01-31 PROCEDURE — 74018 RADEX ABDOMEN 1 VIEW: CPT

## 2025-01-31 RX ORDER — SODIUM CHLORIDE, SODIUM LACTATE, POTASSIUM CHLORIDE, CALCIUM CHLORIDE 600; 310; 30; 20 MG/100ML; MG/100ML; MG/100ML; MG/100ML
INJECTION, SOLUTION INTRAVENOUS CONTINUOUS
Status: ACTIVE | OUTPATIENT
Start: 2025-01-31 | End: 2025-02-01

## 2025-01-31 RX ORDER — ONDANSETRON 2 MG/ML
8 INJECTION INTRAMUSCULAR; INTRAVENOUS EVERY 8 HOURS PRN
Status: DISCONTINUED | OUTPATIENT
Start: 2025-01-31 | End: 2025-02-03 | Stop reason: HOSPADM

## 2025-01-31 RX ORDER — METOPROLOL SUCCINATE 25 MG/1
25 TABLET, EXTENDED RELEASE ORAL DAILY
Status: DISCONTINUED | OUTPATIENT
Start: 2025-01-31 | End: 2025-02-03

## 2025-01-31 RX ORDER — IOPAMIDOL 755 MG/ML
75 INJECTION, SOLUTION INTRAVASCULAR
Status: COMPLETED | OUTPATIENT
Start: 2025-01-31 | End: 2025-01-31

## 2025-01-31 RX ORDER — ONDANSETRON 4 MG/1
8 TABLET, ORALLY DISINTEGRATING ORAL EVERY 8 HOURS PRN
Status: DISCONTINUED | OUTPATIENT
Start: 2025-01-31 | End: 2025-02-03 | Stop reason: HOSPADM

## 2025-01-31 RX ORDER — DIGOXIN 0.25 MG/ML
250 INJECTION INTRAMUSCULAR; INTRAVENOUS EVERY 6 HOURS
Status: DISCONTINUED | OUTPATIENT
Start: 2025-01-31 | End: 2025-01-31

## 2025-01-31 RX ORDER — LACTULOSE 10 G/15ML
30 SOLUTION ORAL
Status: COMPLETED | OUTPATIENT
Start: 2025-01-31 | End: 2025-01-31

## 2025-01-31 RX ORDER — POTASSIUM CHLORIDE 7.45 MG/ML
10 INJECTION INTRAVENOUS
Status: DISPENSED | OUTPATIENT
Start: 2025-01-31 | End: 2025-01-31

## 2025-01-31 RX ORDER — DEXTROSE MONOHYDRATE AND SODIUM CHLORIDE 5; .45 G/100ML; G/100ML
INJECTION, SOLUTION INTRAVENOUS CONTINUOUS
Status: DISCONTINUED | OUTPATIENT
Start: 2025-01-31 | End: 2025-01-31

## 2025-01-31 RX ORDER — SODIUM CHLORIDE, SODIUM LACTATE, POTASSIUM CHLORIDE, CALCIUM CHLORIDE 600; 310; 30; 20 MG/100ML; MG/100ML; MG/100ML; MG/100ML
INJECTION, SOLUTION INTRAVENOUS CONTINUOUS
Status: DISCONTINUED | OUTPATIENT
Start: 2025-01-31 | End: 2025-01-31

## 2025-01-31 RX ORDER — SENNOSIDES A AND B 8.6 MG/1
2 TABLET, FILM COATED ORAL 2 TIMES DAILY
Status: DISCONTINUED | OUTPATIENT
Start: 2025-01-31 | End: 2025-02-03 | Stop reason: HOSPADM

## 2025-01-31 RX ORDER — DILTIAZEM HYDROCHLORIDE 240 MG/1
240 CAPSULE, COATED, EXTENDED RELEASE ORAL DAILY
Status: DISCONTINUED | OUTPATIENT
Start: 2025-01-31 | End: 2025-02-01

## 2025-01-31 RX ORDER — DIGOXIN 0.25 MG/ML
250 INJECTION INTRAMUSCULAR; INTRAVENOUS EVERY 6 HOURS
Status: COMPLETED | OUTPATIENT
Start: 2025-01-31 | End: 2025-01-31

## 2025-01-31 RX ORDER — DIGOXIN 0.25 MG/ML
500 INJECTION INTRAMUSCULAR; INTRAVENOUS ONCE
Status: COMPLETED | OUTPATIENT
Start: 2025-01-31 | End: 2025-01-31

## 2025-01-31 RX ORDER — LACTULOSE 10 G/15ML
20 SOLUTION ORAL 3 TIMES DAILY PRN
Status: DISCONTINUED | OUTPATIENT
Start: 2025-01-31 | End: 2025-02-03 | Stop reason: HOSPADM

## 2025-01-31 RX ORDER — MAGNESIUM SULFATE IN WATER 40 MG/ML
2000 INJECTION, SOLUTION INTRAVENOUS ONCE
Status: DISCONTINUED | OUTPATIENT
Start: 2025-01-31 | End: 2025-02-03 | Stop reason: HOSPADM

## 2025-01-31 RX ORDER — METOCLOPRAMIDE HYDROCHLORIDE 5 MG/ML
10 INJECTION INTRAMUSCULAR; INTRAVENOUS EVERY 6 HOURS
Status: DISCONTINUED | OUTPATIENT
Start: 2025-01-31 | End: 2025-02-03 | Stop reason: HOSPADM

## 2025-01-31 RX ORDER — SPIRONOLACTONE 25 MG/1
25 TABLET ORAL DAILY
Status: DISCONTINUED | OUTPATIENT
Start: 2025-01-31 | End: 2025-02-02

## 2025-01-31 RX ORDER — POLYETHYLENE GLYCOL 3350 17 G/17G
17 POWDER, FOR SOLUTION ORAL 2 TIMES DAILY
Status: DISCONTINUED | OUTPATIENT
Start: 2025-01-31 | End: 2025-02-03 | Stop reason: HOSPADM

## 2025-01-31 RX ORDER — POTASSIUM CHLORIDE 7.45 MG/ML
10 INJECTION INTRAVENOUS ONCE
Status: COMPLETED | OUTPATIENT
Start: 2025-01-31 | End: 2025-01-31

## 2025-01-31 RX ADMIN — POLYETHYLENE GLYCOL 3350 17 G: 17 POWDER, FOR SOLUTION ORAL at 12:03

## 2025-01-31 RX ADMIN — DIGOXIN 250 MCG: 0.25 INJECTION INTRAMUSCULAR; INTRAVENOUS at 17:18

## 2025-01-31 RX ADMIN — POTASSIUM CHLORIDE 10 MEQ: 10 INJECTION, SOLUTION INTRAVENOUS at 15:39

## 2025-01-31 RX ADMIN — METOCLOPRAMIDE HYDROCHLORIDE 10 MG: 5 INJECTION INTRAMUSCULAR; INTRAVENOUS at 17:18

## 2025-01-31 RX ADMIN — POTASSIUM CHLORIDE 10 MEQ: 10 INJECTION, SOLUTION INTRAVENOUS at 13:26

## 2025-01-31 RX ADMIN — POTASSIUM CHLORIDE 10 MEQ: 10 INJECTION, SOLUTION INTRAVENOUS at 14:35

## 2025-01-31 RX ADMIN — SENNOSIDES 17.2 MG: 8.6 TABLET, COATED ORAL at 23:32

## 2025-01-31 RX ADMIN — METOCLOPRAMIDE HYDROCHLORIDE 10 MG: 5 INJECTION INTRAMUSCULAR; INTRAVENOUS at 12:18

## 2025-01-31 RX ADMIN — POTASSIUM CHLORIDE 10 MEQ: 10 INJECTION, SOLUTION INTRAVENOUS at 05:39

## 2025-01-31 RX ADMIN — DILTIAZEM HYDROCHLORIDE 2.5 MG/HR: 5 INJECTION, SOLUTION INTRAVENOUS at 18:04

## 2025-01-31 RX ADMIN — DIGOXIN 250 MCG: 0.25 INJECTION INTRAMUSCULAR; INTRAVENOUS at 23:36

## 2025-01-31 RX ADMIN — SODIUM CHLORIDE, POTASSIUM CHLORIDE, SODIUM LACTATE AND CALCIUM CHLORIDE: 600; 310; 30; 20 INJECTION, SOLUTION INTRAVENOUS at 13:25

## 2025-01-31 RX ADMIN — SPIRONOLACTONE 25 MG: 25 TABLET, FILM COATED ORAL at 10:41

## 2025-01-31 RX ADMIN — LACTULOSE 30 G: 20 SOLUTION ORAL at 12:36

## 2025-01-31 RX ADMIN — DILTIAZEM HYDROCHLORIDE 5 MG/HR: 5 INJECTION, SOLUTION INTRAVENOUS at 08:42

## 2025-01-31 RX ADMIN — DILTIAZEM HYDROCHLORIDE 240 MG: 240 CAPSULE, EXTENDED RELEASE ORAL at 10:41

## 2025-01-31 RX ADMIN — METOPROLOL SUCCINATE 25 MG: 25 TABLET, FILM COATED, EXTENDED RELEASE ORAL at 10:41

## 2025-01-31 RX ADMIN — SENNOSIDES 17.2 MG: 8.6 TABLET, COATED ORAL at 12:36

## 2025-01-31 RX ADMIN — MAGNESIUM SULFATE HEPTAHYDRATE 2000 MG: 40 INJECTION, SOLUTION INTRAVENOUS at 12:11

## 2025-01-31 RX ADMIN — METOCLOPRAMIDE HYDROCHLORIDE 10 MG: 5 INJECTION INTRAMUSCULAR; INTRAVENOUS at 23:38

## 2025-01-31 RX ADMIN — IOPAMIDOL 75 ML: 755 INJECTION, SOLUTION INTRAVENOUS at 08:57

## 2025-01-31 RX ADMIN — DIGOXIN 500 MCG: 0.25 INJECTION INTRAMUSCULAR; INTRAVENOUS at 12:18

## 2025-01-31 RX ADMIN — PANTOPRAZOLE SODIUM 40 MG: 40 INJECTION, POWDER, FOR SOLUTION INTRAVENOUS at 10:27

## 2025-01-31 RX ADMIN — POLYETHYLENE GLYCOL 3350 17 G: 17 POWDER, FOR SOLUTION ORAL at 23:32

## 2025-01-31 ASSESSMENT — PAIN DESCRIPTION - ONSET: ONSET: ON-GOING

## 2025-01-31 ASSESSMENT — PAIN DESCRIPTION - LOCATION: LOCATION: ABDOMEN;BACK;CHEST

## 2025-01-31 ASSESSMENT — PAIN - FUNCTIONAL ASSESSMENT: PAIN_FUNCTIONAL_ASSESSMENT: PREVENTS OR INTERFERES SOME ACTIVE ACTIVITIES AND ADLS

## 2025-01-31 ASSESSMENT — PAIN DESCRIPTION - FREQUENCY: FREQUENCY: CONTINUOUS

## 2025-01-31 ASSESSMENT — PAIN SCALES - GENERAL
PAINLEVEL_OUTOF10: 0
PAINLEVEL_OUTOF10: 8

## 2025-01-31 ASSESSMENT — PAIN DESCRIPTION - PAIN TYPE: TYPE: CHRONIC PAIN;ACUTE PAIN

## 2025-01-31 ASSESSMENT — PAIN DESCRIPTION - ORIENTATION: ORIENTATION: OTHER (COMMENT)

## 2025-01-31 NOTE — PROGRESS NOTES
Speech Language Pathology  Facility/Department:[address]  Clinical Swallow Evaluation    Name: Ricardo Campbell  : 1960  MRN: [id number]                                                     Patient Diagnosis(es):   Patient Active Problem List    Diagnosis Date Noted    Atrial fibrillation (HCC) 2025    Atrial fibrillation with rapid ventricular response (HCC) 2025    Small cell lung cancer in adult (HCC) 2024     Past Medical History:   Diagnosis Date    Hypercholesteremia     Hypertension     MS (multiple sclerosis) (HCC)     Small cell carcinoma metastatic to both lungs (HCC)      Past Surgical History:   Procedure Laterality Date    EYE SURGERY       Reason for Referral:  Ricardo Campbell was referred for a Speech Therapy evaluation to assess swallow function and/or communication.    History of Present Illness  Per MD notes:  \"Ricrado Campbell is a 65 y.o. 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 He describes it as an inability to initiate swallowing for both liquids and solids. No pain involved. Patient also noted some nausea, did not take anything for this. He had called [facility], who recommended that the patient come to the ED. Patient denies chest pain, palpitations, leg swelling, abdominal pain, vomiting, diarrhea, shortness of breath, cough, and any recent sick contacts.  Of note, he also has multiple sclerosis and uses a scooter for ambulation. Other medical history listed below.   Upon evaluation in the ED he was found to be in afib with RVR but was not experiencing any symptoms. CBC unremarkable and BMP unremarkable. He was given a bolus of dilt and placed on a

## 2025-01-31 NOTE — PROGRESS NOTES
Patient heart rate remains elevated 140-150.  He is becoming fatigued from frequent ambulations to bathroom with attempts to pass stool.  Diltizem gtt titrated to 7.5mg/hr.  Will continue to monitor and titrate as needed. .

## 2025-01-31 NOTE — PROGRESS NOTES
Patients Potassium was 3.4 with AM labs. Patient has PRN replacements, however he is NPO so he is unable to take the oral replacement. RN notified DON Madrid     NP responded, \"Will review\"

## 2025-01-31 NOTE — PROGRESS NOTES
Nurse took bedside report on patient and transferred care from SALAS Arvizu RN. Nurse reached out to Dr Yu via perfect serve with the following update:    I just took over as the nurse for this patient. Patient is very uncomfortable, complains of abdominal pain and has not had success having a BM. He states his last BM was a week or more ago. He also has concerns of being exposed to the flu because his children and wife are all flu positve. He remains afebrile. Lactulose, senna and mirlax had been given around noon. He is diaphoretic, his vitals are currently stable. I increased his dilt drip to 7.5 at 1310 and his heart rate is much imporved resting in the mid 90s-110s. Is there anything else you would recommend we give him, maybe an enema or continue to monitor and give the current medications more time and do you think he may potentially need a KUB?

## 2025-01-31 NOTE — PROGRESS NOTES
Internal Medicine Progress Note    Patient Name: Ricardo Campbell   Patient : 1960   Date: 2025   Admit Date: 2025     CC: Irregular Heart Beat (Pt found to be in AFIB w/ RVR by squad. ), Dysphagia (Pt called Dr Waterhouse and reported to them Difficulty swallowing and hasn't been eating or drinking), and Dizziness       Interval History     No overnight events    Patient stated that his nausea was not present when I saw him. He reported no BM for 1 week, but stated it was likely because he hadn't eaten for a week.    VSS    Labs significant for K 3.4, Mg 1.87, HCO3 30.4    ROS   Review of Systems   Constitutional:  Positive for fatigue and unexpected weight change. Negative for chills and fever.   HENT:  Negative for congestion, rhinorrhea and sore throat.    Respiratory:  Positive for cough. Negative for chest tightness, shortness of breath and wheezing.    Cardiovascular:  Negative for chest pain, palpitations and leg swelling.   Gastrointestinal:  Positive for constipation (No BM for 1 week). Negative for abdominal pain, diarrhea, nausea and vomiting.   Neurological:  Positive for weakness and headaches. Negative for syncope, light-headedness and numbness.   All other systems reviewed and are negative.         Objective     I/Os:  I/O last 3 completed shifts:  In: -   Out: 675 [Urine:675]      Vital Signs:  Patient Vitals for the past 8 hrs:   BP Temp Temp src Pulse Resp SpO2 Weight   25 1316 119/65 98.7 °F (37.1 °C) Oral 93 23 98 % --   25 1030 129/80 -- -- -- -- -- --   25 0821 -- -- -- -- -- -- 107.7 kg (237 lb 6.4 oz)   25 0613 -- -- -- 96 15 -- --       Physical Exam:  Physical Exam  Vitals and nursing note reviewed.   Constitutional:       General: He is not in acute distress.     Appearance: He is obese. He is ill-appearing.   HENT:      Head: Normocephalic and atraumatic.      Mouth/Throat:      Mouth: Mucous membranes are dry.      Pharynx: Oropharynx is

## 2025-01-31 NOTE — PROGRESS NOTES
Physical Therapy  Facility/Department: [address]  Physical Therapy Initial Assessment and Treatment    Name: Ricardo Campbell  : 1960  MRN: [id number]  Date of Service: 2025    Discharge Recommendations:  Subacute/Skilled Nursing Facility (vs home with 24 hr capable assist pending progress)   PT Equipment Recommendations  Equipment Needed: No      Patient Diagnosis(es): The primary encounter diagnosis was Atrial fibrillation with rapid ventricular response (HCC). A diagnosis of Non-small cell lung cancer with metastasis (HCC) was also pertinent to this visit.  Past Medical History:  has a past medical history of Hypercholesteremia, Hypertension, MS (multiple sclerosis) (HCC), and Small cell carcinoma metastatic to both lungs (HCC).  Past Surgical History:  has a past surgical history that includes Eye surgery.    Assessment  Body Structures, Functions, Activity Limitations Requiring Skilled Therapeutic Intervention: Decreased functional mobility ;Decreased endurance;Decreased balance  Assessment: Pt with decreased independent mobility from baseline.  Pt reports normally independent with ambulation with cane or RW however admits to declining mobility over last few weeks.  Pt currently needing CGA for transfers and CG to mod assist for ambulation.  Pt reported acute onset of dizziness while ambulating and needed mod assist to recover balance.  Pt at high risk for falls.  Not safe to ambulate alone.  Concerns for pt returning home.  Currently needing assist for mobility and pt reports wife unable to provide physical assist.  Rec cont skilled PT to maximize mobility and independence.  If pt would return home, rec direct capable 24 hr assist and home PT.  Will follow  Treatment Diagnosis: impaired functional mobility 2/2 decreased balance and endurance  Decision Making: Medium Complexity  Requires PT Follow-Up: Yes    Plan  Physical Therapy Plan  General Plan:  (2-5)  Current Treatment

## 2025-01-31 NOTE — PROGRESS NOTES
Patient had terrible constipation, visible impaction, nurse took orders for an enema, admin around 1500 with immediate twila blood and visible stool impaction and severe abdominal pain from patient. His blood pressure went to 60s/50s, nurse laid him flat, stop his dilt drip at 1517 and contact MD. Resident came to bedside, patient BP improved, nurse counted trends of BP every 15 min. Dr Vilelgas ordered CBC and results were improved from prior. Patient remained flat for about an hour, his vitals improved, dilt has remained off and he has been able to have multiple small liquid bowel movements, he still constipation and impaction. Per resident the plan of care is to continue with pharmaceutical treatment and if no luck by morning surgery should be consulted for de-compaction.

## 2025-01-31 NOTE — PROGRESS NOTES
Received report from ED RN, Carlos, earlier in this shift. He stated patient was told to go to ED by Dr. Waterhouse for trouble swallowing. Only patient history given in report was trouble swallowing. No mention of CT done in ED. ED RN stated patient was in afib RVR on admission to ED from [facility]. He stated dilt pushes were given in the ED and the patient was started on a cardizem drip.

## 2025-01-31 NOTE — PROGRESS NOTES
Patient complaining of severe constipation.  Miralax available on MAR.  Dose given. Patient continues to attempt to have a bowel movement.  Able to pass smears.  Perfect serve to Dr Yu for additional laxatives.  Order for senna and lactulose received.  Given to patient.

## 2025-01-31 NOTE — CARE COORDINATION
Addendum at 2:41pm: Received VM from SEGUN Ramires Transitional nurse at [facility] stating she received the information needing for home care and will follow up with pt. She can be reached at [phone].    Attempted meeting with pt at bedside to discuss therapy recs but he just returned to the room and was being seen by staff. Requested conversation at another time    Addendum at 1:00pm: [facility] stating [facility] sent the PT/OT notes to his PCP but they do reflect SNF and he doesn't have SNF benefits, only at [facility]. Quita said that the PCP office would arrange for home care when discharged if this is what pt wants.     Attempted meeting with pt at bedside to discuss and he was off the floor at this time.     Addendum at 10:57am: Received call from [facility]. Updated her on pt wanting home therapy. She said he was just switched to home base primary care. She said she will watch [facility] for therapy notes and once in, will send to pt's primary care. She said a home care order will need to be placed when discharge for RN/PT/OT. She can be reached at [phone]    10:32am: Case Management Assessment  Initial Evaluation    Date/Time of Evaluation: 1/31/2025 10:32 AM  Assessment Completed by: BRENDA Mclean   for [facility] [place] ([facility])  [employer]: [phone]    If patient is discharged prior to next notation, then this note serves as note for discharge by case management.    Patient Name: Ricardo Campbell                   YOB: 1960  Diagnosis: Atrial fibrillation (HCC) [I48.91]  Atrial fibrillation with rapid ventricular response (HCC) [I48.91]  Non-small cell lung cancer with metastasis (HCC) [C34.90]                   Date / Time: 1/30/2025 12:01 PM    Patient Admission Status: Observation   Readmission Risk (Low < 19, Mod (19-27), High > 27): Readmission Risk Score: 12    Current PCP: No

## 2025-01-31 NOTE — PLAN OF CARE
Problem: Pain  Goal: Verbalizes/displays adequate comfort level or baseline comfort level  Outcome: Progressing   Has no complaints of pain but does have discomfort from constipation and relief medications have been given and the goal is to have a successful BM today.   Problem: Safety - Adult  Goal: Free from fall injury  Outcome: Progressing   Patient is on a bed alarm, understands risk for falls and prevention.

## 2025-01-31 NOTE — PROGRESS NOTES
Comprehensive Nutrition Assessment    RECOMMENDATIONS:  PO Diet: Clear liquid  Nutrition Supplement: Ensure Clear TID  Nutrition Education: Education/Counseling not indicated     NUTRITION ASSESSMENT:   Nutritional summary & status: Recently dx lung cancer w/ brain mets s/p Imdeltra, cycle #2, day #1 on 1/21/25 . Atrial fibrillation with RVR, likely secondary to dehydration from poor oral intake per MD. Reports dysphagia for the past week, eating minimal amounts, not even able to tolerate liquids. EMR shows significant weight loss of 2.5% in 1 week/6#. Noted enlarging hilar/mediastinal mass. Along with dysphagia, is nauseous and vomits after PO intake recently. Was not taking antiemetics because he didn't believe he would need them. Educated on antiemetic use. Reglan scheduled + Zofran PRN. SLP tried to eval, but pt w/ limited PO accpetance. No EGD today from GI.     Admission // PMH: Afib // lung cancer w/ brain mets, MS, tobbaco abuse    MALNUTRITION ASSESSMENT  Context of Malnutrition: Acute Illness (on chronic)   Malnutrition Status: Moderate malnutrition  Findings of the 6 clinical characteristics of malnutrition (Minimum of 2 out of 6 clinical characteristics is required to make the diagnosis of moderate or severe Protein Calorie Malnutrition based on AND/ASPEN Guidelines):  Energy Intake:  50% or less of estimated energy requirements for 5 or more days  Weight Loss:  1% to 2% over 1 week (2% over 1 week)     Body Fat Loss:  Unable to assess     Muscle Mass Loss:  No muscle mass loss        NUTRITION DIAGNOSIS   Moderate malnutrition, in context of acute illness or injury related to inadequate protein-energy intake as evidenced by criteria as identified in malnutrition assessment    Nutrition Monitoring and Evaluation:   Food/Nutrient Intake Outcomes:  Progression of Nutrition, Diet Advancement/Tolerance, Food and Nutrient Intake, Supplement Intake  Physical Signs/Symptoms Outcomes:  Biochemical Data,

## 2025-01-31 NOTE — PROGRESS NOTES
Perfect serve sent to Dr Yu.  Cardiology notes state anticoagulation is not recommended.  Order received to to hold lovenox.

## 2025-01-31 NOTE — PROGRESS NOTES
Occupational Therapy  Facility/Department: [address]  Occupational Therapy Initial Assessment/Treatment    Name: Ricardo Campbell  : 1960  MRN: [id number]  Date of Service: 2025    Discharge Recommendations:  24 hour supervision or assist (vs home with 24 hr capable assist pending progress)  OT Equipment Recommendations  Equipment Needed: Yes  Mobility Devices: ADL Assistive Devices  ADL Assistive Devices: Sock-Aid Soft;Reacher       Patient Diagnosis(es): The primary encounter diagnosis was Atrial fibrillation with rapid ventricular response (HCC). A diagnosis of Non-small cell lung cancer with metastasis (HCC) was also pertinent to this visit.  Past Medical History:  has a past medical history of Hypercholesteremia, Hypertension, MS (multiple sclerosis) (HCC), and Small cell carcinoma metastatic to both lungs (HCC).  Past Surgical History:  has a past surgical history that includes Eye surgery.    Treatment Diagnosis: Impaired ADL, functional mobility, functional activity tolerance      Assessment  Performance deficits / Impairments: Decreased functional mobility ;Decreased ADL status;Decreased endurance;Decreased balance;Decreased strength  Assessment: Pt presents with a decline in functional independence.  Pt is from home with wife.  It appears pt and wife assist each other.  Currently, pt req CG to mod assist for functional mobility and max assist for lower body ADL.  Pt with increased HR and has tendency to get dizzy with activity. Concerns for pt returning home.  BSC place in pt's room for bedside or over toilet use.  Currently needing assist for mobility and pt reports wife unable to provide physical assist.  Feel pt would benefit from further skilled IP OT to maximize safety and functional independence.   If pt would return home, rec direct capable 24 hr assist and home OT.  Treatment Diagnosis: Impaired ADL, functional mobility, functional activity tolerance  Prognosis:

## 2025-01-31 NOTE — PLAN OF CARE
Problem: Pain  Goal: Verbalizes/displays adequate comfort level or baseline comfort level  Outcome: Progressing  Patient had no complaints of pain this shift      Problem: Safety - Adult  Goal: Free from fall injury  Outcome: Progressing  Pt is a High fall risk.   Explained fall risk precautions to pt and rationale behind their use to keep the patient safe. Belongings are in reach. Pt encouraged to notify staff for any and all assistance. Staff present in tx suite throughout entirety of pts treatment to monitor and protect from falls.      Problem: Respiratory - Adult  Goal: Achieves optimal ventilation and oxygenation  Outcome: Progressing  Patient tolerated room air this shift      Problem: Infection - Adult  Goal: Absence of infection during hospitalization  Outcome: Progressing  Patient afebrile this shift

## 2025-02-01 LAB
25(OH)D3 SERPL-MCNC: 39.4 NG/ML
ALBUMIN SERPL-MCNC: 3.3 G/DL (ref 3.4–5)
ANION GAP SERPL CALCULATED.3IONS-SCNC: 9 MMOL/L (ref 3–16)
BASOPHILS # BLD: 0.1 K/UL (ref 0–0.2)
BASOPHILS NFR BLD: 0.7 %
BUN SERPL-MCNC: 4 MG/DL (ref 7–20)
CALCIUM SERPL-MCNC: 9 MG/DL (ref 8.3–10.6)
CHLORIDE SERPL-SCNC: 105 MMOL/L (ref 99–110)
CO2 SERPL-SCNC: 24 MMOL/L (ref 21–32)
CREAT SERPL-MCNC: 0.6 MG/DL (ref 0.8–1.3)
DEPRECATED RDW RBC AUTO: 14.1 % (ref 12.4–15.4)
EOSINOPHIL # BLD: 0 K/UL (ref 0–0.6)
EOSINOPHIL NFR BLD: 0.2 %
FERRITIN SERPL IA-MCNC: 741 NG/ML (ref 30–400)
FOLATE SERPL-MCNC: 18.9 NG/ML (ref 4.78–24.2)
GFR SERPLBLD CREATININE-BSD FMLA CKD-EPI: >90 ML/MIN/{1.73_M2}
GLUCOSE BLD-MCNC: 162 MG/DL (ref 70–99)
GLUCOSE SERPL-MCNC: 110 MG/DL (ref 70–99)
HCT VFR BLD AUTO: 34.2 % (ref 40.5–52.5)
HGB BLD-MCNC: 11.6 G/DL (ref 13.5–17.5)
IRON SATN MFR SERPL: 22 % (ref 20–50)
IRON SERPL-MCNC: 42 UG/DL (ref 59–158)
LYMPHOCYTES # BLD: 2.7 K/UL (ref 1–5.1)
LYMPHOCYTES NFR BLD: 22.1 %
MCH RBC QN AUTO: 32.1 PG (ref 26–34)
MCHC RBC AUTO-ENTMCNC: 33.8 G/DL (ref 31–36)
MCV RBC AUTO: 94.9 FL (ref 80–100)
MONOCYTES # BLD: 1.1 K/UL (ref 0–1.3)
MONOCYTES NFR BLD: 8.7 %
NEUTROPHILS # BLD: 8.4 K/UL (ref 1.7–7.7)
NEUTROPHILS NFR BLD: 68.3 %
PERFORMED ON: ABNORMAL
PHOSPHATE SERPL-MCNC: 3.6 MG/DL (ref 2.5–4.9)
PLATELET # BLD AUTO: 320 K/UL (ref 135–450)
PMV BLD AUTO: 7.3 FL (ref 5–10.5)
POTASSIUM SERPL-SCNC: 3.4 MMOL/L (ref 3.5–5.1)
RBC # BLD AUTO: 3.6 M/UL (ref 4.2–5.9)
SODIUM SERPL-SCNC: 138 MMOL/L (ref 136–145)
TIBC SERPL-MCNC: 189 UG/DL (ref 260–445)
VIT B12 SERPL-MCNC: 1091 PG/ML (ref 211–911)
WBC # BLD AUTO: 12.3 K/UL (ref 4–11)

## 2025-02-01 PROCEDURE — 85025 COMPLETE CBC W/AUTO DIFF WBC: CPT

## 2025-02-01 PROCEDURE — 6360000002 HC RX W HCPCS

## 2025-02-01 PROCEDURE — 82607 VITAMIN B-12: CPT

## 2025-02-01 PROCEDURE — 6370000000 HC RX 637 (ALT 250 FOR IP): Performed by: INTERNAL MEDICINE

## 2025-02-01 PROCEDURE — 2580000003 HC RX 258: Performed by: HOSPITALIST

## 2025-02-01 PROCEDURE — 82746 ASSAY OF FOLIC ACID SERUM: CPT

## 2025-02-01 PROCEDURE — 80069 RENAL FUNCTION PANEL: CPT

## 2025-02-01 PROCEDURE — 2500000003 HC RX 250 WO HCPCS

## 2025-02-01 PROCEDURE — 6370000000 HC RX 637 (ALT 250 FOR IP): Performed by: HOSPITALIST

## 2025-02-01 PROCEDURE — 2580000003 HC RX 258

## 2025-02-01 PROCEDURE — 82306 VITAMIN D 25 HYDROXY: CPT

## 2025-02-01 PROCEDURE — 2060000000 HC ICU INTERMEDIATE R&B

## 2025-02-01 PROCEDURE — 99233 SBSQ HOSP IP/OBS HIGH 50: CPT | Performed by: INTERNAL MEDICINE

## 2025-02-01 PROCEDURE — 82525 ASSAY OF COPPER: CPT

## 2025-02-01 RX ORDER — DILTIAZEM HCL 60 MG
60 TABLET ORAL EVERY 6 HOURS
Status: DISCONTINUED | OUTPATIENT
Start: 2025-02-01 | End: 2025-02-03

## 2025-02-01 RX ORDER — POTASSIUM CHLORIDE 7.45 MG/ML
10 INJECTION INTRAVENOUS
Status: DISPENSED | OUTPATIENT
Start: 2025-02-01 | End: 2025-02-01

## 2025-02-01 RX ADMIN — METOCLOPRAMIDE HYDROCHLORIDE 10 MG: 5 INJECTION INTRAMUSCULAR; INTRAVENOUS at 18:11

## 2025-02-01 RX ADMIN — DILTIAZEM HYDROCHLORIDE 60 MG: 60 TABLET, FILM COATED ORAL at 18:11

## 2025-02-01 RX ADMIN — DILTIAZEM HYDROCHLORIDE 5 MG/HR: 5 INJECTION, SOLUTION INTRAVENOUS at 09:01

## 2025-02-01 RX ADMIN — DILTIAZEM HYDROCHLORIDE 240 MG: 240 CAPSULE, EXTENDED RELEASE ORAL at 08:54

## 2025-02-01 RX ADMIN — POLYETHYLENE GLYCOL 3350 17 G: 17 POWDER, FOR SOLUTION ORAL at 09:22

## 2025-02-01 RX ADMIN — SENNOSIDES 17.2 MG: 8.6 TABLET, COATED ORAL at 21:23

## 2025-02-01 RX ADMIN — METOPROLOL SUCCINATE 25 MG: 25 TABLET, FILM COATED, EXTENDED RELEASE ORAL at 08:42

## 2025-02-01 RX ADMIN — DILTIAZEM HYDROCHLORIDE 5 MG/HR: 5 INJECTION, SOLUTION INTRAVENOUS at 14:12

## 2025-02-01 RX ADMIN — POTASSIUM CHLORIDE 10 MEQ: 10 INJECTION, SOLUTION INTRAVENOUS at 09:08

## 2025-02-01 RX ADMIN — POTASSIUM CHLORIDE 10 MEQ: 10 INJECTION, SOLUTION INTRAVENOUS at 14:12

## 2025-02-01 RX ADMIN — METOCLOPRAMIDE HYDROCHLORIDE 10 MG: 5 INJECTION INTRAMUSCULAR; INTRAVENOUS at 11:36

## 2025-02-01 RX ADMIN — DILTIAZEM HYDROCHLORIDE 60 MG: 60 TABLET, FILM COATED ORAL at 12:53

## 2025-02-01 RX ADMIN — POLYETHYLENE GLYCOL 3350 17 G: 17 POWDER, FOR SOLUTION ORAL at 21:23

## 2025-02-01 RX ADMIN — SODIUM CHLORIDE, POTASSIUM CHLORIDE, SODIUM LACTATE AND CALCIUM CHLORIDE: 600; 310; 30; 20 INJECTION, SOLUTION INTRAVENOUS at 06:27

## 2025-02-01 RX ADMIN — PANTOPRAZOLE SODIUM 40 MG: 40 INJECTION, POWDER, FOR SOLUTION INTRAVENOUS at 08:37

## 2025-02-01 RX ADMIN — METHYLPREDNISOLONE SODIUM SUCCINATE 1000 MG: 1 INJECTION INTRAMUSCULAR; INTRAVENOUS at 18:07

## 2025-02-01 RX ADMIN — SENNOSIDES 17.2 MG: 8.6 TABLET, COATED ORAL at 08:43

## 2025-02-01 RX ADMIN — POTASSIUM CHLORIDE 10 MEQ: 10 INJECTION, SOLUTION INTRAVENOUS at 12:53

## 2025-02-01 RX ADMIN — METOCLOPRAMIDE HYDROCHLORIDE 10 MG: 5 INJECTION INTRAMUSCULAR; INTRAVENOUS at 05:37

## 2025-02-01 RX ADMIN — POTASSIUM CHLORIDE 10 MEQ: 10 INJECTION, SOLUTION INTRAVENOUS at 10:59

## 2025-02-01 RX ADMIN — SODIUM CHLORIDE, PRESERVATIVE FREE 10 ML: 5 INJECTION INTRAVENOUS at 08:39

## 2025-02-01 ASSESSMENT — PAIN DESCRIPTION - ONSET: ONSET: ON-GOING

## 2025-02-01 ASSESSMENT — PAIN SCALES - GENERAL
PAINLEVEL_OUTOF10: 7
PAINLEVEL_OUTOF10: 0

## 2025-02-01 ASSESSMENT — PAIN DESCRIPTION - LOCATION: LOCATION: ABDOMEN;BACK;CHEST

## 2025-02-01 ASSESSMENT — PAIN DESCRIPTION - ORIENTATION: ORIENTATION: OTHER (COMMENT)

## 2025-02-01 ASSESSMENT — PAIN DESCRIPTION - FREQUENCY: FREQUENCY: CONTINUOUS

## 2025-02-01 ASSESSMENT — PAIN - FUNCTIONAL ASSESSMENT: PAIN_FUNCTIONAL_ASSESSMENT: PREVENTS OR INTERFERES SOME ACTIVE ACTIVITIES AND ADLS

## 2025-02-01 ASSESSMENT — PAIN DESCRIPTION - PAIN TYPE: TYPE: ACUTE PAIN

## 2025-02-01 NOTE — PLAN OF CARE
Neurology Plan of Care:    Consult note reviewed, patient exxamined. Reports continued difficulty swallowing. He says he has had MRIs in the past despite metallic object in eye, but [facility] so I am unable to see records. He did tell the Neurologist that he was told not to have head in scanner, so while MRI could be helpful, feel there are too many unknowns surrounding the safety of it at this time.   He is open to trying steroids as this has helped him in the past.    Exam:  Awake, oriented x 4. No aphasia, soft speech, hoarse voice. Follows commands in all four extremities    Blinks to threat bilaterally. Mild nystagmus noted when looking to L, though note very noticeable. Notes some blurred/double vision when he does that but states \"that's normal) Smile symmetric. Tongue midline     R  L    Deltoid 5 5   Biceps 5 5   Triceps 5 5   Wrist extension  5 5   Interossei 5 5      R  L    Hip flexion  5 5   Hip extension  5- 5   Knee flexion  4- 4   Knee extension  4- 4   Ankle dorsiflexion  4 4   Ankle plantar flexion  5 5     Sensation: Intact throughout    Impression/Plan:  64 yo patient with both history of MS and history of intracranial metastases. Difficult to differentiate between the two without an MRI. Hospitalist and oncology team open to trialing high dose steroids, so will plan to order today    - IV Methylprednisolone 1000 mg daily for 3 doses  - PPI and glucose checks while on steroids  - Discussed MRI brain with Hospitalist, who reports they are finding records. With what he told my attending physician on 1/31 I will hold off for now.   - Q4 hour neuro checks  - PT/OT  - SLP eval and treat  - Neurology will follow, please call with any concerns    Case discussed with Dr. Yu, Dr. Wiley, patient, resident team    GARRET Galvan-CNP  Neurology & Neurocritical Care   Neurology Line: [phone]  PerfectServe: [facility] Neurology & Neuro Critical Care NPs

## 2025-02-01 NOTE — PROGRESS NOTES
[facility]  Cardiology Daily Progress Note  2/1/2025,9:54 AM      Jayden Cabral MD ,[facility]  No primary care provider on file.  Primary cardiologist:    Admit Date:  1/30/2025  Hospital Day: Hospital Day: 3  Subjective:  Mr. Campbell with atrial fibs rapid ventricular response.  Did convert with the use of diltiazem drip yesterday and shortly after drip was discontinued he went back in A-fib RVR.  During the night he did convert back to sinus and is in sinus at this time on drip.  Will plan to add oral diltiazem.    Interval change since last visit  Small cell carcinoma on the chemotherapy  Hypertension  Recurring atrial fibrillation    Objective:   /65   Pulse 85   Temp 98.4 °F (36.9 °C) (Oral)   Resp 21   Ht 1.829 m (6')   Wt 108.9 kg (240 lb)   SpO2 99%   BMI 32.55 kg/m²     Intake/Output Summary (Last 24 hours) at 2/1/2025 0954  Last data filed at 2/1/2025 0702  Gross per 24 hour   Intake 1900 ml   Output 1900 ml   Net 0 ml       TELEMETRY: Sinus 98     Physical Examination:    Vitals:    01/31/25 1949 02/01/25 0009 02/01/25 0448 02/01/25 0826   BP: (!) 122/55 137/64 126/63 128/65   Pulse: 99 93 81 85   Resp: 23 23 22 21   Temp: 98.8 °F (37.1 °C) 98.9 °F (37.2 °C) 98.7 °F (37.1 °C) 98.4 °F (36.9 °C)   TempSrc: Oral Oral Oral Oral   SpO2: 97% 98% 94% 99%   Weight:       Height:            Constitutional and General Appearance:  Healthy. And alert .  HEENT: eyes and ears intact. No nasal masses  THYROID: not enlarged  LUNGS:  Clear to auscultation and percussion  HEART and VASCULAR:  The apical impulses not displaced  Heart tones are crisp and normal  Cervical veins are not engorged  The carotid upstroke is normal in amplitude and contour without delay or bruit  Peripheral pulses are symmetrical and full  There is no clubbing, cyanosis of the extremities.  No peripheral edema  Femoral Arteries: 2+ and equal  Pedal Pulses: 2+ and equal   ABDOMEN::  No masses

## 2025-02-01 NOTE — PROGRESS NOTES
ONCOLOGY HEMATOLOGY CARE PROGRESS NOTE      SUBJECTIVE: Had an enema w/ good relief of constipation      ROS:   Constitutional:  No weight loss, No fever, No chills, No night sweats.  Energy level good.  Eyes:  No impairment or change in vision  ENT / Mouth:  No pain, abnormal ulceration, bleeding, nasal drip or change in voice or hearing  Cardiovascular:  No chest pain, palpitations, new edema, or calf discomfort  Respiratory:  No pain, hemoptysis, change to breathing  Breast:  No pain, discharge, change in appearance or texture  Gastrointestinal:  No pain, cramping, jaundice, change to eating and bowel habits  Urinary:  No pain, bleeding or change in continence  Genitalia: No pain, bleeding or discharge  Musculoskeletal:  No redness, pain, edema or weakness  Skin:  No pruritus, rash, change to nodules or lesions  Neurologic:  No discomfort, change in mental status, speech, sensory or motor activity  Psychiatric:  No change in concentration or change to affect or mood  Endocrine:  No hot flashes, increased thirst, or change to urine production  Hematologic: No petechiae, ecchymosis or bleeding  Lymphatic:  No lymphadenopathy or lymphedema  Allergy / Immunologic:  No eczema, hives, frequent or recurrent infections    OBJECTIVE      Mr. Campbell  is a 65 y.o. male we are seeing for Extensive Stage Small Cell Lung Cancer. He is followed by Dr. Waterhouse and Dr. Suero as an outpatient. Currently treated with Imdeltra, s/p cycle #2, day #1 1/21/25. His wife called in to [facility] triage yesterday with c/o weakness, difficulty swallowing, not able to get out of bed. He was encouraged to come in to the office for evaluation however patient was refusing. Ultimately, they called EMS and he was taken to the ED for evaluation. Upon evaluation in the ED he was found to be in afib with RVR but was not experiencing any symptoms. CBC unremarkable and BMP unremarkable. He was given a bolus of

## 2025-02-01 NOTE — PROGRESS NOTES
Patient had PO and IV drip diltiazem ordered at beginning of shift. Diltiazem drip was discontinued. Patient HR began jumping in and out of 140s/150s. Diltiazem drip was re-ordered at 5 mg/hr.     RN contacted Dr. Cabral who ordered 60 mg of Diltiazem PO q6 hours, discontinuation of 240 mg daily PO dose, and to titrate drip down as long as heart rate remained stable following PO dose. Nursing communication order placed outlining parameters for titrating down IV Diltiazem. RN gave 60 mg PO dose per order at 1300. Monitoring heart rate at this time. Plan to titrate drip to 2.5 mg/hr at 1700 pending stable rate.       At 1700 RN titrated drip down to 2.5. By 1745 patient's heart rate destabilized. RN contacted Dr. Cabral again, given new orders to return to 5 mg/hr rate of diltiazem drip and continue to give 60 mg PO q6 hours. Will reevalutate and retry to titrate off drip tomorrow.

## 2025-02-01 NOTE — PLAN OF CARE
Problem: Pain  Goal: Verbalizes/displays adequate comfort level or baseline comfort level  Outcome: Progressing  Note: Pt did not report pain this shift. Pt educated on importance of calling for pain meds when in pain. Pt verbalized understanding.     Problem: Safety - Adult  Goal: Free from fall injury  Outcome: Progressing  Note: Pt is a High fall risk. See Katz Fall Score and ABCDS Injury Risk assessments.   + Screening for Orthostasis and/or + High Fall Risk per KATZ/ABCDS: Explained fall risk precautions to pt and family and rationale behind their use to keep the patient safe. Pt bed is in low position, side rails up, call light and belongings are in reach. Fall wristband applied and present on pts wrist.  Bed alarm on.  Pt encouraged to call for assistance. Will continue with hourly rounds for PO intake, pain needs, toileting and repositioning as needed.      Problem: ABCDS Injury Assessment  Goal: Absence of physical injury  Outcome: Progressing  Note: No new reported or observed physical injuries this shift.      Problem: Cardiovascular - Adult  Goal: Absence of cardiac dysrhythmias or at baseline  Outcome: Progressing  Note: Patient in process of transitioning from IV to PO diltiazem, pending heart rate remains stable.

## 2025-02-02 LAB
ALBUMIN SERPL-MCNC: 3.5 G/DL (ref 3.4–5)
ANION GAP SERPL CALCULATED.3IONS-SCNC: 14 MMOL/L (ref 3–16)
BUN SERPL-MCNC: 4 MG/DL (ref 7–20)
CALCIUM SERPL-MCNC: 9 MG/DL (ref 8.3–10.6)
CHLORIDE SERPL-SCNC: 102 MMOL/L (ref 99–110)
CO2 SERPL-SCNC: 22 MMOL/L (ref 21–32)
CREAT SERPL-MCNC: 0.6 MG/DL (ref 0.8–1.3)
EKG ATRIAL RATE: 76 BPM
EKG DIAGNOSIS: NORMAL
EKG P AXIS: 60 DEGREES
EKG P-R INTERVAL: 164 MS
EKG Q-T INTERVAL: 408 MS
EKG QRS DURATION: 70 MS
EKG QTC CALCULATION (BAZETT): 459 MS
EKG R AXIS: 43 DEGREES
EKG T AXIS: 52 DEGREES
EKG VENTRICULAR RATE: 76 BPM
GFR SERPLBLD CREATININE-BSD FMLA CKD-EPI: >90 ML/MIN/{1.73_M2}
GLUCOSE BLD-MCNC: 172 MG/DL (ref 70–99)
GLUCOSE BLD-MCNC: 179 MG/DL (ref 70–99)
GLUCOSE BLD-MCNC: 187 MG/DL (ref 70–99)
GLUCOSE BLD-MCNC: 244 MG/DL (ref 70–99)
GLUCOSE BLD-MCNC: 279 MG/DL (ref 70–99)
GLUCOSE SERPL-MCNC: 193 MG/DL (ref 70–99)
HCT VFR BLD AUTO: 36.1 % (ref 40.5–52.5)
HGB BLD-MCNC: 11.9 G/DL (ref 13.5–17.5)
PERFORMED ON: ABNORMAL
PHOSPHATE SERPL-MCNC: 4.2 MG/DL (ref 2.5–4.9)
POTASSIUM SERPL-SCNC: 3.7 MMOL/L (ref 3.5–5.1)
SODIUM SERPL-SCNC: 138 MMOL/L (ref 136–145)

## 2025-02-02 PROCEDURE — 6370000000 HC RX 637 (ALT 250 FOR IP)

## 2025-02-02 PROCEDURE — 93005 ELECTROCARDIOGRAM TRACING: CPT | Performed by: HOSPITALIST

## 2025-02-02 PROCEDURE — 6360000002 HC RX W HCPCS

## 2025-02-02 PROCEDURE — 94640 AIRWAY INHALATION TREATMENT: CPT

## 2025-02-02 PROCEDURE — 80069 RENAL FUNCTION PANEL: CPT

## 2025-02-02 PROCEDURE — 85018 HEMOGLOBIN: CPT

## 2025-02-02 PROCEDURE — 93010 ELECTROCARDIOGRAM REPORT: CPT | Performed by: INTERNAL MEDICINE

## 2025-02-02 PROCEDURE — 2060000000 HC ICU INTERMEDIATE R&B

## 2025-02-02 PROCEDURE — 2580000003 HC RX 258

## 2025-02-02 PROCEDURE — 6370000000 HC RX 637 (ALT 250 FOR IP): Performed by: HOSPITALIST

## 2025-02-02 PROCEDURE — 6370000000 HC RX 637 (ALT 250 FOR IP): Performed by: INTERNAL MEDICINE

## 2025-02-02 PROCEDURE — 85014 HEMATOCRIT: CPT

## 2025-02-02 PROCEDURE — 99233 SBSQ HOSP IP/OBS HIGH 50: CPT | Performed by: INTERNAL MEDICINE

## 2025-02-02 RX ORDER — ROPINIROLE 1 MG/1
2 TABLET, FILM COATED ORAL NIGHTLY PRN
Status: DISCONTINUED | OUTPATIENT
Start: 2025-02-02 | End: 2025-02-03 | Stop reason: HOSPADM

## 2025-02-02 RX ORDER — MORPHINE SULFATE 30 MG/1
30 TABLET, FILM COATED, EXTENDED RELEASE ORAL 2 TIMES DAILY
Status: DISCONTINUED | OUTPATIENT
Start: 2025-02-02 | End: 2025-02-03 | Stop reason: HOSPADM

## 2025-02-02 RX ORDER — INSULIN LISPRO 100 [IU]/ML
0-4 INJECTION, SOLUTION INTRAVENOUS; SUBCUTANEOUS
Status: DISCONTINUED | OUTPATIENT
Start: 2025-02-02 | End: 2025-02-02

## 2025-02-02 RX ORDER — GLUCAGON 1 MG/ML
1 KIT INJECTION PRN
Status: DISCONTINUED | OUTPATIENT
Start: 2025-02-02 | End: 2025-02-03 | Stop reason: HOSPADM

## 2025-02-02 RX ORDER — ROPINIROLE 2 MG/1
2 TABLET, FILM COATED ORAL NIGHTLY PRN
COMMUNITY

## 2025-02-02 RX ORDER — DEXTROSE MONOHYDRATE 100 MG/ML
INJECTION, SOLUTION INTRAVENOUS CONTINUOUS PRN
Status: DISCONTINUED | OUTPATIENT
Start: 2025-02-02 | End: 2025-02-03 | Stop reason: HOSPADM

## 2025-02-02 RX ORDER — AMANTADINE HYDROCHLORIDE 100 MG/1
100 TABLET ORAL 2 TIMES DAILY
COMMUNITY

## 2025-02-02 RX ORDER — LOSARTAN POTASSIUM 100 MG/1
100 TABLET ORAL DAILY
Status: ON HOLD | COMMUNITY
End: 2025-02-03 | Stop reason: HOSPADM

## 2025-02-02 RX ORDER — MORPHINE SULFATE 30 MG/1
30 TABLET, FILM COATED, EXTENDED RELEASE ORAL 2 TIMES DAILY
COMMUNITY

## 2025-02-02 RX ORDER — ALLOPURINOL 300 MG/1
300 TABLET ORAL DAILY
Status: DISCONTINUED | OUTPATIENT
Start: 2025-02-02 | End: 2025-02-03 | Stop reason: HOSPADM

## 2025-02-02 RX ORDER — SPIRONOLACTONE 25 MG/1
12.5 TABLET ORAL DAILY
Status: DISCONTINUED | OUTPATIENT
Start: 2025-02-03 | End: 2025-02-03

## 2025-02-02 RX ORDER — KETOROLAC TROMETHAMINE 30 MG/ML
15 INJECTION, SOLUTION INTRAMUSCULAR; INTRAVENOUS ONCE
Status: COMPLETED | OUTPATIENT
Start: 2025-02-02 | End: 2025-02-02

## 2025-02-02 RX ORDER — DALFAMPRIDINE 10 MG/1
10 TABLET, FILM COATED, EXTENDED RELEASE ORAL DAILY
COMMUNITY

## 2025-02-02 RX ORDER — ALLOPURINOL 300 MG/1
300 TABLET ORAL DAILY
COMMUNITY

## 2025-02-02 RX ORDER — LOSARTAN POTASSIUM 100 MG/1
100 TABLET ORAL DAILY
Status: DISCONTINUED | OUTPATIENT
Start: 2025-02-02 | End: 2025-02-03

## 2025-02-02 RX ORDER — INSULIN LISPRO 100 [IU]/ML
0-4 INJECTION, SOLUTION INTRAVENOUS; SUBCUTANEOUS
Status: DISCONTINUED | OUTPATIENT
Start: 2025-02-02 | End: 2025-02-03 | Stop reason: HOSPADM

## 2025-02-02 RX ADMIN — METOCLOPRAMIDE HYDROCHLORIDE 10 MG: 5 INJECTION INTRAMUSCULAR; INTRAVENOUS at 00:33

## 2025-02-02 RX ADMIN — Medication 3 MG: at 20:03

## 2025-02-02 RX ADMIN — DILTIAZEM HYDROCHLORIDE 60 MG: 60 TABLET, FILM COATED ORAL at 18:24

## 2025-02-02 RX ADMIN — PANTOPRAZOLE SODIUM 40 MG: 40 INJECTION, POWDER, FOR SOLUTION INTRAVENOUS at 09:44

## 2025-02-02 RX ADMIN — TIOTROPIUM BROMIDE AND OLODATEROL 2 PUFF: 3.124; 2.736 SPRAY, METERED RESPIRATORY (INHALATION) at 12:36

## 2025-02-02 RX ADMIN — POLYETHYLENE GLYCOL 3350 17 G: 17 POWDER, FOR SOLUTION ORAL at 20:10

## 2025-02-02 RX ADMIN — METOCLOPRAMIDE HYDROCHLORIDE 10 MG: 5 INJECTION INTRAMUSCULAR; INTRAVENOUS at 23:51

## 2025-02-02 RX ADMIN — DILTIAZEM HYDROCHLORIDE 60 MG: 60 TABLET, FILM COATED ORAL at 12:44

## 2025-02-02 RX ADMIN — Medication 3 MG: at 00:32

## 2025-02-02 RX ADMIN — KETOROLAC TROMETHAMINE 15 MG: 30 INJECTION, SOLUTION INTRAMUSCULAR at 09:46

## 2025-02-02 RX ADMIN — POLYETHYLENE GLYCOL 3350 17 G: 17 POWDER, FOR SOLUTION ORAL at 09:43

## 2025-02-02 RX ADMIN — ROPINIROLE HYDROCHLORIDE 2 MG: 1 TABLET, FILM COATED ORAL at 20:03

## 2025-02-02 RX ADMIN — METOCLOPRAMIDE HYDROCHLORIDE 10 MG: 5 INJECTION INTRAMUSCULAR; INTRAVENOUS at 16:16

## 2025-02-02 RX ADMIN — LOSARTAN POTASSIUM 100 MG: 100 TABLET, FILM COATED ORAL at 12:41

## 2025-02-02 RX ADMIN — METOCLOPRAMIDE HYDROCHLORIDE 10 MG: 5 INJECTION INTRAMUSCULAR; INTRAVENOUS at 06:24

## 2025-02-02 RX ADMIN — SENNOSIDES 17.2 MG: 8.6 TABLET, COATED ORAL at 20:03

## 2025-02-02 RX ADMIN — DILTIAZEM HYDROCHLORIDE 60 MG: 60 TABLET, FILM COATED ORAL at 00:32

## 2025-02-02 RX ADMIN — METOCLOPRAMIDE HYDROCHLORIDE 10 MG: 5 INJECTION INTRAMUSCULAR; INTRAVENOUS at 11:29

## 2025-02-02 RX ADMIN — METOPROLOL SUCCINATE 25 MG: 25 TABLET, FILM COATED, EXTENDED RELEASE ORAL at 09:43

## 2025-02-02 RX ADMIN — DILTIAZEM HYDROCHLORIDE 60 MG: 60 TABLET, FILM COATED ORAL at 06:24

## 2025-02-02 RX ADMIN — ATORVASTATIN CALCIUM 20 MG: 20 TABLET, FILM COATED ORAL at 20:03

## 2025-02-02 RX ADMIN — MORPHINE SULFATE 30 MG: 30 TABLET, FILM COATED, EXTENDED RELEASE ORAL at 20:03

## 2025-02-02 RX ADMIN — MORPHINE SULFATE 30 MG: 30 TABLET, FILM COATED, EXTENDED RELEASE ORAL at 12:41

## 2025-02-02 RX ADMIN — DILTIAZEM HYDROCHLORIDE 60 MG: 60 TABLET, FILM COATED ORAL at 23:51

## 2025-02-02 RX ADMIN — METHYLPREDNISOLONE SODIUM SUCCINATE 1000 MG: 1 INJECTION INTRAMUSCULAR; INTRAVENOUS at 10:01

## 2025-02-02 RX ADMIN — ALLOPURINOL 300 MG: 300 TABLET ORAL at 12:41

## 2025-02-02 RX ADMIN — INSULIN LISPRO 2 UNITS: 100 INJECTION, SOLUTION INTRAVENOUS; SUBCUTANEOUS at 19:04

## 2025-02-02 RX ADMIN — SENNOSIDES 17.2 MG: 8.6 TABLET, COATED ORAL at 09:28

## 2025-02-02 ASSESSMENT — PAIN DESCRIPTION - LOCATION
LOCATION: CHEST;BACK
LOCATION: SHOULDER;BACK

## 2025-02-02 ASSESSMENT — PAIN DESCRIPTION - ONSET
ONSET: ON-GOING

## 2025-02-02 ASSESSMENT — PAIN - FUNCTIONAL ASSESSMENT
PAIN_FUNCTIONAL_ASSESSMENT: ACTIVITIES ARE NOT PREVENTED

## 2025-02-02 ASSESSMENT — PAIN DESCRIPTION - ORIENTATION
ORIENTATION: MID

## 2025-02-02 ASSESSMENT — PAIN DESCRIPTION - DESCRIPTORS
DESCRIPTORS: ACHING;STABBING
DESCRIPTORS: ACHING
DESCRIPTORS: ACHING;STABBING

## 2025-02-02 ASSESSMENT — PAIN SCALES - GENERAL
PAINLEVEL_OUTOF10: 0
PAINLEVEL_OUTOF10: 7
PAINLEVEL_OUTOF10: 7
PAINLEVEL_OUTOF10: 6
PAINLEVEL_OUTOF10: 8
PAINLEVEL_OUTOF10: 3
PAINLEVEL_OUTOF10: 6
PAINLEVEL_OUTOF10: 8
PAINLEVEL_OUTOF10: 0

## 2025-02-02 ASSESSMENT — PAIN DESCRIPTION - PAIN TYPE
TYPE: CHRONIC PAIN

## 2025-02-02 ASSESSMENT — PAIN DESCRIPTION - FREQUENCY
FREQUENCY: CONTINUOUS

## 2025-02-02 NOTE — PROGRESS NOTES
2227 - On call hospitalist NP Tremaine perfectserved for PRN sleep med request of patient. Held melatonin unheld in the MAR.     0400 - Pt discussed with writer need to have his home PO meds unheld. Patient agreeable to waiting and addressing it in the AM with MD.

## 2025-02-02 NOTE — PROGRESS NOTES
Internal Medicine Progress Note    Patient Name: Ricardo Campbell   Patient : 1960   Date: 2025   Admit Date: 2025     CC: Irregular Heart Beat (Pt found to be in AFIB w/ RVR by squad. ), Dysphagia (Pt called Dr Waterhouse and reported to them Difficulty swallowing and hasn't been eating or drinking), and Dizziness       Interval History     No overnight events    Patient is eager to leave, but willing to stay for steroid course.    VSS    Labs significant for Hgb 11.9    ROS   Review of Systems   Constitutional:  Positive for fatigue and unexpected weight change. Negative for chills and fever.   HENT:  Negative for congestion, rhinorrhea and sore throat.    Respiratory:  Positive for cough. Negative for chest tightness, shortness of breath and wheezing.    Cardiovascular:  Negative for chest pain, palpitations and leg swelling.   Gastrointestinal:  Negative for abdominal pain, diarrhea, nausea and vomiting.   Musculoskeletal:  Positive for back pain.   Neurological:  Positive for weakness. Negative for syncope, light-headedness and numbness.   All other systems reviewed and are negative.         Objective     I/Os:  I/O last 3 completed shifts:  In: 920 [P.O.:920]  Out: 4300 [Urine:4275; Stool:25]      Vital Signs:  Patient Vitals for the past 8 hrs:   BP Temp Temp src Pulse Resp SpO2 Weight   25 1135 126/60 98.4 °F (36.9 °C) Oral 79 24 98 % --   25 0921 (!) 119/56 98.6 °F (37 °C) Oral 80 22 100 % --   25 0732 -- -- -- -- -- -- 103.6 kg (228 lb 6.4 oz)       Physical Exam:  Physical Exam  Vitals and nursing note reviewed.   Constitutional:       General: He is not in acute distress.     Appearance: He is obese. He is ill-appearing.   HENT:      Head: Normocephalic and atraumatic.      Mouth/Throat:      Mouth: Mucous membranes are dry.      Pharynx: Oropharynx is clear.   Eyes:      General: No scleral icterus.  Cardiovascular:      Rate and Rhythm: Normal rate and regular

## 2025-02-02 NOTE — PROGRESS NOTES
Internal Medicine Progress Note    Patient Name: Ricardo Campbell   Patient : 1960   Date: 2025   Admit Date: 2025     CC: Irregular Heart Beat (Pt found to be in AFIB w/ RVR by squad. ), Dysphagia (Pt called Dr Waterhouse and reported to them Difficulty swallowing and hasn't been eating or drinking), and Dizziness       Interval History     Patient had a bowel movement and is no longer feeling obstipated.    Metal fragment in eye contraindicates MRI    VSS    Labs significant for K 3.4, BUN 4, WBC 12.3, Hgb 11.6    ROS   Review of Systems   Constitutional:  Positive for fatigue and unexpected weight change. Negative for chills and fever.   HENT:  Negative for congestion, rhinorrhea and sore throat.    Respiratory:  Positive for cough. Negative for chest tightness, shortness of breath and wheezing.    Cardiovascular:  Negative for chest pain, palpitations and leg swelling.   Gastrointestinal:  Negative for abdominal pain, diarrhea, nausea and vomiting.   Neurological:  Positive for weakness. Negative for syncope, light-headedness and numbness.   All other systems reviewed and are negative.         Objective     I/Os:  I/O last 3 completed shifts:  In: 2440 [P.O.:1040; I.V.:1400]  Out: 3650 [Urine:3425; Stool:225]      Vital Signs:  Patient Vitals for the past 8 hrs:   BP Temp Temp src Pulse Resp SpO2   25 1811 121/66 -- -- -- -- --   25 1619 131/60 97.9 °F (36.6 °C) Oral 77 17 100 %   25 1253 128/68 -- -- -- -- --       Physical Exam:  Physical Exam  Vitals and nursing note reviewed.   Constitutional:       General: He is not in acute distress.     Appearance: He is obese. He is ill-appearing.   HENT:      Head: Normocephalic and atraumatic.      Mouth/Throat:      Mouth: Mucous membranes are dry.      Pharynx: Oropharynx is clear.   Eyes:      General: No scleral icterus.  Cardiovascular:      Rate and Rhythm: Normal rate and regular rhythm.      Pulses: Normal pulses.

## 2025-02-02 NOTE — PROGRESS NOTES
Interventional cardiology progress note    Patient is awake and alert.  His rhythm has remained stable sinus rhythm since last evening.  He had been receiving the diltiazem at 60 mg every 6 hours and tolerating it very well and blood pressure today is 120/64.  He has also been on the diltiazem drip overnight and no more A-fib.  At this point I think we can feel that he is probably adequately loaded.  Will plan to reduce the diltiazem from 5 mg to 2.5 mg for the next 4 hours and then discontinue it and then expectantly observe.    I have asked nurse to give me a call if there are issues.  Otherwise we will see him tomorrow on on the oral dose and after in 4 hours discontinue the IV drip.    Jayden Cabral MD, FACC

## 2025-02-02 NOTE — PROGRESS NOTES
Dr. Cabral gave RN verbal order to titrate diltiazem drip down to 2.5 mL hour at 1030. RN titrated appropriately. Continuing to give PO diltiazem q6 hours. Med rec done by pharmacy. Home meds resumed, including home bp meds. Monitoring heart rate and blood pressure frequently. If all remains stable, ordered to trial discontinuing drip at 1430.     Diltiazem drip stopped at 1440. Patient remains stable at this point. Heart rate irregular but in low 90s. Dr. Cabral informed of irregularity. Will continue to give PO doses of diltiazem and monitor.

## 2025-02-02 NOTE — PLAN OF CARE
Problem: Pain  Goal: Verbalizes/displays adequate comfort level or baseline comfort level  Outcome: Progressing  Note: Pt reported pain this shift. One time does of toradol given per MAR. Pt educated on importance of calling for pain meds when in pain. Pt verbalized understanding.      Problem: Safety - Adult  Goal: Free from fall injury  Outcome: Progressing  Note: Pt is a High fall risk. See Katz Fall Score and ABCDS Injury Risk assessments.   + Screening for Orthostasis and/or + High Fall Risk per KATZ/ABCDS: Explained fall risk precautions to pt and family and rationale behind their use to keep the patient safe. Pt bed is in low position, side rails up, call light and belongings are in reach. Fall wristband applied and present on pts wrist.  Chair Alarm on.  Pt encouraged to call for assistance. Will continue with hourly rounds for PO intake, pain needs, toileting and repositioning as needed.      Problem: ABCDS Injury Assessment  Goal: Absence of physical injury  Outcome: Progressing  Note: No new reported or observed physical injuries this shift.      Problem: Nutrition Deficit:  Goal: Optimize nutritional status  Outcome: Not Progressing  Note: Patient not wanting to eat hospital food. PO fluid intake is satisfactory.

## 2025-02-03 VITALS
TEMPERATURE: 98 F | WEIGHT: 226.6 LBS | RESPIRATION RATE: 18 BRPM | HEART RATE: 98 BPM | HEIGHT: 72 IN | DIASTOLIC BLOOD PRESSURE: 69 MMHG | OXYGEN SATURATION: 97 % | BODY MASS INDEX: 30.69 KG/M2 | SYSTOLIC BLOOD PRESSURE: 124 MMHG

## 2025-02-03 LAB
ALBUMIN SERPL-MCNC: 3.7 G/DL (ref 3.4–5)
ANION GAP SERPL CALCULATED.3IONS-SCNC: 12 MMOL/L (ref 3–16)
BUN SERPL-MCNC: 8 MG/DL (ref 7–20)
CALCIUM SERPL-MCNC: 9.6 MG/DL (ref 8.3–10.6)
CHLORIDE SERPL-SCNC: 105 MMOL/L (ref 99–110)
CO2 SERPL-SCNC: 24 MMOL/L (ref 21–32)
COPPER SERPL-MCNC: 122.6 UG/DL (ref 70–140)
CREAT SERPL-MCNC: 0.7 MG/DL (ref 0.8–1.3)
GFR SERPLBLD CREATININE-BSD FMLA CKD-EPI: >90 ML/MIN/{1.73_M2}
GLUCOSE BLD-MCNC: 170 MG/DL (ref 70–99)
GLUCOSE BLD-MCNC: 181 MG/DL (ref 70–99)
GLUCOSE SERPL-MCNC: 177 MG/DL (ref 70–99)
HCT VFR BLD AUTO: 36.4 % (ref 40.5–52.5)
HGB BLD-MCNC: 12.2 G/DL (ref 13.5–17.5)
PERFORMED ON: ABNORMAL
PERFORMED ON: ABNORMAL
PHOSPHATE SERPL-MCNC: 3.7 MG/DL (ref 2.5–4.9)
POTASSIUM SERPL-SCNC: 3.6 MMOL/L (ref 3.5–5.1)
SODIUM SERPL-SCNC: 141 MMOL/L (ref 136–145)

## 2025-02-03 PROCEDURE — 6370000000 HC RX 637 (ALT 250 FOR IP)

## 2025-02-03 PROCEDURE — 6370000000 HC RX 637 (ALT 250 FOR IP): Performed by: INTERNAL MEDICINE

## 2025-02-03 PROCEDURE — 85018 HEMOGLOBIN: CPT

## 2025-02-03 PROCEDURE — 85014 HEMATOCRIT: CPT

## 2025-02-03 PROCEDURE — 6360000002 HC RX W HCPCS: Performed by: HOSPITALIST

## 2025-02-03 PROCEDURE — 94640 AIRWAY INHALATION TREATMENT: CPT

## 2025-02-03 PROCEDURE — 94761 N-INVAS EAR/PLS OXIMETRY MLT: CPT

## 2025-02-03 PROCEDURE — 6370000000 HC RX 637 (ALT 250 FOR IP): Performed by: HOSPITALIST

## 2025-02-03 PROCEDURE — 2580000003 HC RX 258: Performed by: HOSPITALIST

## 2025-02-03 PROCEDURE — 80069 RENAL FUNCTION PANEL: CPT

## 2025-02-03 PROCEDURE — 99233 SBSQ HOSP IP/OBS HIGH 50: CPT | Performed by: INTERNAL MEDICINE

## 2025-02-03 PROCEDURE — 6360000002 HC RX W HCPCS

## 2025-02-03 RX ORDER — DILTIAZEM HYDROCHLORIDE 240 MG/1
240 CAPSULE, COATED, EXTENDED RELEASE ORAL DAILY
Status: DISCONTINUED | OUTPATIENT
Start: 2025-02-03 | End: 2025-02-03 | Stop reason: HOSPADM

## 2025-02-03 RX ORDER — METOPROLOL SUCCINATE 50 MG/1
50 TABLET, EXTENDED RELEASE ORAL DAILY
Status: DISCONTINUED | OUTPATIENT
Start: 2025-02-04 | End: 2025-02-03 | Stop reason: HOSPADM

## 2025-02-03 RX ORDER — FUROSEMIDE 10 MG/ML
40 INJECTION INTRAMUSCULAR; INTRAVENOUS ONCE
Status: COMPLETED | OUTPATIENT
Start: 2025-02-03 | End: 2025-02-03

## 2025-02-03 RX ORDER — METOPROLOL SUCCINATE 50 MG/1
50 TABLET, EXTENDED RELEASE ORAL DAILY
Qty: 30 TABLET | Refills: 3 | Status: SHIPPED | OUTPATIENT
Start: 2025-02-04

## 2025-02-03 RX ADMIN — MORPHINE SULFATE 30 MG: 30 TABLET, FILM COATED, EXTENDED RELEASE ORAL at 09:56

## 2025-02-03 RX ADMIN — TIOTROPIUM BROMIDE AND OLODATEROL 2 PUFF: 3.124; 2.736 SPRAY, METERED RESPIRATORY (INHALATION) at 08:57

## 2025-02-03 RX ADMIN — METOCLOPRAMIDE HYDROCHLORIDE 10 MG: 5 INJECTION INTRAMUSCULAR; INTRAVENOUS at 06:22

## 2025-02-03 RX ADMIN — METOCLOPRAMIDE HYDROCHLORIDE 10 MG: 5 INJECTION INTRAMUSCULAR; INTRAVENOUS at 09:54

## 2025-02-03 RX ADMIN — SENNOSIDES 17.2 MG: 8.6 TABLET, COATED ORAL at 09:55

## 2025-02-03 RX ADMIN — PANTOPRAZOLE SODIUM 40 MG: 40 INJECTION, POWDER, FOR SOLUTION INTRAVENOUS at 09:55

## 2025-02-03 RX ADMIN — FUROSEMIDE 40 MG: 10 INJECTION, SOLUTION INTRAMUSCULAR; INTRAVENOUS at 09:54

## 2025-02-03 RX ADMIN — OXYCODONE HYDROCHLORIDE 10 MG: 5 TABLET ORAL at 09:54

## 2025-02-03 RX ADMIN — DILTIAZEM HYDROCHLORIDE 240 MG: 240 CAPSULE, EXTENDED RELEASE ORAL at 11:56

## 2025-02-03 RX ADMIN — METOPROLOL SUCCINATE 25 MG: 25 TABLET, FILM COATED, EXTENDED RELEASE ORAL at 09:56

## 2025-02-03 RX ADMIN — DILTIAZEM HYDROCHLORIDE 60 MG: 60 TABLET, FILM COATED ORAL at 06:25

## 2025-02-03 RX ADMIN — DULOXETINE HYDROCHLORIDE 30 MG: 30 CAPSULE, DELAYED RELEASE ORAL at 09:55

## 2025-02-03 RX ADMIN — Medication 1000 UNITS: at 09:55

## 2025-02-03 RX ADMIN — ALLOPURINOL 300 MG: 300 TABLET ORAL at 09:56

## 2025-02-03 RX ADMIN — POLYETHYLENE GLYCOL 3350 17 G: 17 POWDER, FOR SOLUTION ORAL at 09:55

## 2025-02-03 RX ADMIN — METHYLPREDNISOLONE SODIUM SUCCINATE 1000 MG: 1 INJECTION INTRAMUSCULAR; INTRAVENOUS at 11:56

## 2025-02-03 ASSESSMENT — ENCOUNTER SYMPTOMS
DIARRHEA: 0
SORE THROAT: 0
COUGH: 1
WHEEZING: 0
CHEST TIGHTNESS: 0
VOMITING: 0
SHORTNESS OF BREATH: 0
BACK PAIN: 1
ABDOMINAL PAIN: 0
RHINORRHEA: 0
NAUSEA: 0

## 2025-02-03 ASSESSMENT — PAIN - FUNCTIONAL ASSESSMENT: PAIN_FUNCTIONAL_ASSESSMENT: ACTIVITIES ARE NOT PREVENTED

## 2025-02-03 ASSESSMENT — PAIN SCALES - GENERAL
PAINLEVEL_OUTOF10: 4
PAINLEVEL_OUTOF10: 7

## 2025-02-03 ASSESSMENT — PAIN DESCRIPTION - ONSET: ONSET: ON-GOING

## 2025-02-03 ASSESSMENT — PAIN DESCRIPTION - DESCRIPTORS: DESCRIPTORS: ACHING

## 2025-02-03 ASSESSMENT — PAIN DESCRIPTION - ORIENTATION: ORIENTATION: MID

## 2025-02-03 ASSESSMENT — PAIN DESCRIPTION - FREQUENCY: FREQUENCY: CONTINUOUS

## 2025-02-03 ASSESSMENT — PAIN DESCRIPTION - LOCATION: LOCATION: BACK;SHOULDER

## 2025-02-03 ASSESSMENT — PAIN DESCRIPTION - PAIN TYPE: TYPE: CHRONIC PAIN

## 2025-02-03 NOTE — PROGRESS NOTES
NEUROLOGY PROGRESS NOTE       Patient Name: Ricardo Campbell YOB: 1960   Sex: Male Age: 65 yrs     CC / Reason for Consult: History of multiple sclerosis in a patient with metastatic small cell carcinoma with worsening dysphagia, possible MS exacerbation    Changes over last 24 hours:   Completed 2/3 doses of methylpred 1000 mg IV    ROS: Dysphonia, dysphagia improved     ASSESSMENT & RECOMMENDATIONS   Assessment:  66 yo patient with both history of MS and history of intracranial metastases. Difficult to differentiate between the two without an MRI. Hospitalist and oncology team open to trialing high dose steroids, so will plan to order today.     Plan:  - Complete 3rd dose IV Methylprednisolone 1000 mg today  - PPI and glucose checks while on steroid  - Q4 hour neuro checks  - PT/OT  - SLP eval and treat  - Discussed utility of obtaining MRI brain with [facility] nurse in room today, would be helpful to proceed with MRI if able to distinguish MS flare vs. Met disease, plan to obtain follow-up with [facility] and possible MRI  - Neurology to sign off.     Plan discussed with patient, [facility] nurse at bedside coordinating care, primary team.    Jarred Michele, APRN - CNP   Neurology  2/3/2025 7:50 AM  PerfectS[facility]: [facility] Neurology    HISTORY   Interval History: Started steroids on 2/2    PMH Past Medical History:   Diagnosis Date    Hypercholesteremia     Hypertension     MS (multiple sclerosis) (HCC)     Small cell carcinoma metastatic to both lungs (HCC)       Allergies Allergies   Allergen Reactions    Bee Venom Anaphylaxis    Chloraseptic [Phenol] Anaphylaxis      Diet ADULT ORAL NUTRITION SUPPLEMENT; Breakfast, Lunch, Dinner; Clear Liquid Oral Supplement  ADULT DIET; Dysphagia - Soft and Bite Sized   Isolation No active isolations     CURRENT SCHEDULED MEDICATIONS   Inpatient Medications     spironolactone, 12.5 mg, Oral, Daily    allopurinol, 300 mg, Oral, Daily    losartan, 100 mg, Oral, Daily

## 2025-02-03 NOTE — SIGNIFICANT EVENT
Palliative care following. I have spoken to the patient regarding goals of care and code status. At this time he expresses that he will like to transition to comfort care. We will change his code status from FULL CODE to DNR-CC      Mandy MD Carli, PGY-3  Internal Medicine

## 2025-02-03 NOTE — DISCHARGE INSTRUCTIONS
Medications:  Stop taking Lopressor and instead start taking Toprol XL 50 mg extended release tablet by mouth once daily instead.  Stop taking Cozaar.  Continue taking your other medications as prescribed.    Follow ups:    Please contact the office of Waterhouse, David M, MD at [phone] to schedule a follow-up appointment.  Contact  Affairs to schedule a hospitalization follow up appointment.

## 2025-02-03 NOTE — DISCHARGE SUMMARY
oncology  Significant Diagnostic Studies:    XR ABDOMEN (KUB) (SINGLE AP VIEW)   Final Result      No obstruction      Electronically signed by Carlos Brady MD      CT SOFT TISSUE NECK W CONTRAST   Final Result   1. No acute abnormality within the neck.   2. Findings of malignancy in the upper chest, as seen on comparison imaging.      Electronically signed by Rakesh Corrales      CT CHEST PULMONARY EMBOLISM W CONTRAST   Final Result   1. No evidence of pulmonary embolus.   2. Enlarging left hilar/mediastinal mass, which demonstrates interval increase   in size when compared with the prior study of 1/8/2025. This results in mass   effect upon the left hilum including the left mainstem bronchus and pulmonary   artery.               Electronically signed by Garland Vallejo      XR CHEST (2 VW)   Final Result   1. Enlarging left hilar mass otherwise no acute cardiopulmonary abnormality.      Electronically signed by Deep Sellers        Disposition: home with home health  Discharged Condition: Stable  Follow Up: Primary Care Physician in one week    DISCHARGE MEDICATION:     Medication List        START taking these medications      metoprolol succinate 50 MG extended release tablet  Commonly known as: TOPROL XL  Take 1 tablet by mouth daily  Start taking on: February 4, 2025            CONTINUE taking these medications      albuterol sulfate  (90 Base) MCG/ACT inhaler  Commonly known as: Proventil HFA  Inhale 2 puffs into the lungs every 4 hours as needed for Wheezing or Shortness of Breath With spacer (and mask if indicated). Thanks.     allopurinol 300 MG tablet  Commonly known as: ZYLOPRIM     Amantadine 100 MG Tabs tablet  Commonly known as: SYMMETREL     atorvastatin 20 MG tablet  Commonly known as: LIPITOR     benzonatate 100 MG capsule  Commonly known as: TESSALON     dalfampridine 10 MG extended release tablet  Commonly known as: AMPYRA     dilTIAZem 240 MG extended release capsule  Commonly known

## 2025-02-03 NOTE — CARE COORDINATION
Addendum at 2:17pm: received call from Lulú IVERSON stating pt wanted a referral to [facility] ([facility]). Met with pt at bedside who confirmed he wanted a referral to [facility] with them calling him once home. He is aware a referral will be sent and requested them to follow up at home. He was in agreement with this plan. RN present and aware of conversation.     Referral called to Cee at [facility] and facesheet faxed via epic. She is aware pt is requesting follow up at home as he is discharging today.    8:26am:          Case Management Assessment            Discharge Note                    Date / Time of Note: 2/3/2025 8:26 AM                  Discharge Note Completed by: BRENDA Mclean   for [facility] ([facility])  fitkit Mobile: [phone]    Patient Name: Ricardo Campbell   YOB: 1960  Diagnosis: Atrial fibrillation (HCC) [I48.91]  Atrial fibrillation with rapid ventricular response (HCC) [I48.91]  Non-small cell lung cancer with metastasis (HCC) [C34.90]  Nausea & vomiting [R11.2]   Date / Time: 1/30/2025 12:01 PM    Current PCP: No primary care provider on file.  Clinic patient: No    Hospitalization in the last 30 days: Yes  Readmission Assessment  Number of Days since last admission?: 8-30 days  Previous Disposition: Home with Family  Who is being Interviewed: Patient  What was the patient's/caregiver's perception as to why they think they needed to return back to the hospital?: Other (Comment) (Poor intake and didn't feel well)  Did you visit your Primary Care Physician after you left the hospital, before you returned this time?: No  Why weren't you able to visit your PCP?: Did not have an appointment  Did you see a specialist, such as Cardiac, Pulmonary, Orthopedic Physician, etc. after you left the hospital?: Yes  Who advised the patient to return to the hospital?: Self-referral, Physician  Does the patient report anything that got in the

## 2025-02-03 NOTE — CONSULTS
[facility]    Consult Note      Requesting Physician:  Jesus Clemente    CHIEF COMPLAINT:  Irregular Heart beat       HISTORY OF PRESENT ILLNESS:    Mr. Cambpell  is a 65 y.o. male we are seeing in consultation for Extensive Stage Small Cell Lung Cancer. He is followed by Dr. Waterhouse and Dr. Suero as an outpatient. Currently treated with Imdeltra, s/p cycle #2, day #1 1/21/25. His wife called in to [facility] triage yesterday with c/o weakness, difficulty swallowing, not able to get out of bed. He was encouraged to come in to the office for evaluation however patient was refusing. Ultimately, they called EMS and he was taken to the ED for evaluation. Upon evaluation in the ED he was found to be in afib with RVR but was not experiencing any symptoms. CBC unremarkable and BMP unremarkable. He was given a bolus of dilt and placed on a dilt drip.     CT scan with left hilar/mediastinal mass, which demonstrates interval increase  in size when compared with the prior study of 1/8/2025. This results in mass  effect upon the left hilum including the left mainstem bronchus and pulmonary  artery.    Today he is seen in the bedside chair. No family at bedside. Remains in Afib with RVR. HR in 150s. He is very dismissive of this and states he no longer worries about this because its been such an ongoing issue for him. Feels dehydration has probably triggered this most recent episode. He reports dizziness and SOB with activity.     He reports difficulty swallowing at home. Has not been able to eat or drink. Reports that he is hungry but when he does get something down, he vomits it back up. He reports feeling nauseated, particularly when standing. He was not taking any antiemetics at home.     He reports fatigue, not being able to get out of bed. He states he has had PT at home but has been unable to 
Clinical Pharmacy Consult Note  Medication History     Admit Date: 1/30/2025    Pharmacy consulted to verify home medication list by Dr. Hope.    List of current medications patient is taking is complete. Home Medication list in Epic updated to reflect changes noted below.    Source of information: Pt's spouse & [facility] medication dispense summary     Patient's home pharmacy: [facility] ([phone])     Changes made to medication list:   Medications removed: (include reason, ex: therapy completed, patient no longer taking, etc.)  Striverdi Respimat, pt no longer using   Medications added:   Morphine 30 mg ER  Dalfampridine 10 mg ER  Losartan 100 mg  Tiotropium-Olodaterol 2.5-2.5 mcg/act   Allopurinol 300 mg  Amantadine 100 mg  Ropinirole 2 mg  Medication doses adjusted:   Vitamin D - adjusted from 1 tab daily to 3 tabs daily   Other notes:   None    Current Outpatient Medications   Medication Instructions    albuterol sulfate HFA (PROVENTIL HFA) 108 (90 BASE) MCG/ACT inhaler 2 puffs, Inhalation, EVERY 4 HOURS PRN, With spacer (and mask if indicated). Thanks.    allopurinol (ZYLOPRIM) 300 mg, Oral, DAILY    Amantadine (SYMMETREL) 100 mg, Oral, 2 TIMES DAILY    atorvastatin (LIPITOR) 20 mg, Oral, Nightly    benzonatate (TESSALON) 100 mg, Oral, 3 TIMES DAILY PRN    dalfampridine (AMPYRA) 10 mg, Oral, DAILY    dilTIAZem (CARDIZEM CD) 240 mg, Oral, DAILY    docusate sodium (COLACE) 200 mg, Oral, DAILY, Takes at night    DULoxetine (CYMBALTA) 30 mg, Oral, DAILY    fluticasone (FLONASE) 50 MCG/ACT nasal spray 1 spray, Each Nostril, DAILY PRN    loratadine (CLARITIN) 10 mg, Oral, DAILY    losartan (COZAAR) 100 mg, Oral, DAILY    metoprolol tartrate (LOPRESSOR) 25 mg, Oral, 2 TIMES DAILY    morphine (MS CONTIN) 30 mg, Oral, 2 TIMES DAILY    nitroGLYCERIN (NITROSTAT) 0.4 mg, SubLINGual, EVERY 5 MIN PRN, up to max of 3 total doses. If no relief after 1 dose, call 911.    omeprazole (PRILOSEC) 20 mg, Oral, DAILY    
Dictated # [id number]  
[facility]  Palliative Medicine Consultation Note      Date Of Admission:1/30/2025  Date of consult: 02/03/25  Seen by PC in the past:  Yes (not at this facility)    Recommendations:        Patient seen by palliative care today.  We discussed goals of care extensively and at this time patient expresses his wishes to be DNR CC.  He was notably DNR CC on 1/9/2025.  He expresses that he would like to have his goals to be symptom control and quality rather than quantity of life.  He reports at this time that he is doing better with the steroid treatments and that his difficulty swallowing has improved.  We have notified [facility] and at this time we have additionally changed patient's CODE STATUS to DNR CC. Hospice consulted.    1. Goals of Care/Advanced Care planning/Code status: Full code --> DNR CC  2. Pain: no pain   3. SOB: improved, RA  4. Disposition: to be d/c, plan for d/c planning with hospice pending pending discussions with [facility] & case management    Reason for Consult:         [x]  Goals of Care  []  Code Status Discussion/Advanced Care Planning   []  Psychosocial/Family Support  []  Symptom Management  []  Other (Specify)    Requesting Physician: Dr. Yu    CHIEF COMPLAINT:  dysphagia    History Obtained From:  patient, chart review    History of Present Illness:         Ricardo Campbell is a 65 y.o. male with PMH of MS (scooter dependent), tobacco use dx, SCLC w/brain mets 5/24 f/w Drs. Waterhouse & Nimo on imdeltra and brain mets radiation, PE not an AC d/t prior ICH who p/w fatigue, progressive dysphagia w/difficulty of initiating swallow to liquid and solid w/associated n/anorexia on 1/30/25 and reported to present to the ED after discussion with [facility]. Patient was then found to be in afib RVR and received dilt gtt and imaging at that time revealed enlarging left hilar/mediastinal mass w/mass effect on the left hilum including the left mainstem bronchus and pulmonary artery on CT 
     Alcohol History       Alcohol Use Status  Yes Comment  6 beers per week              Drug Use       Drug Use Status  No              Sexual Activity       Sexually Active  Not Asked                     Family History:  History reviewed. No pertinent family history.     Allergies:  Allergies   Allergen Reactions    Bee Venom Anaphylaxis    Chloraseptic [Phenol] Anaphylaxis        ROS:   General: No fever or weight change  Hematologic: No unexpected submucosal bleeding or bruising  HEENT: No sore throat or facial pain  Respiratory: No cough or dyspnea  Cardiovascular: No angina or dependent edema  Gastrointestinal: See HPI  Musculoskeletal: No usual joint pain or stiffness  Skin: No skin eruptions or changing lesions  Neurologic: No focal weakness or numbness  Psychiatric: No anxiety or sleep disturbance    Physical Exam:  Vital Signs:   Vitals:    01/31/25 0613   BP:    Pulse: 96   Resp: 15   Temp:    SpO2:        General: Well-nourished, well-developed  HEENT: Sclera anicteric, mucosal membranes moist  Cardiovascular: Regular rate and rhythm.  No murmurs.  Respiratory: Respirations nonlabored, no crepitus  GI: Abdomen nondistended, soft, and nontender.  Normal active bowel sounds.  No masses palpable. Rectal: Deferred  Musculoskeletal: No pitting edema of the lower legs.  Neurological: Gross memory appears intact.  Patient is alert and oriented      Recent Imaging:   CT CHEST PULMONARY EMBOLISM W CONTRAST  Narrative: EXAM: CT CHEST PULMONARY EMBOLISM W CONTRAST    INDICATION: A-fib with RVR, cancer patient not anticoagulated    COMPARISON: 1/8/2025    TECHNIQUE:  IV Contrast Media and volume: 80 cc OMNI 350  Helically acquired pulmonary embolism protocol. MIP reformats were submitted.  Up-to-date CT equipment and radiation dose reduction techniques were employed.  3-dimensional vascular reconstructions were provided for review.    FINDINGS:    Diagnostic quality: Adequate.    Pulmonary emboli: None.  Right 
respirations unlabored   Chest Wall:  No tenderness or deformity   Heart:  Regular rate and rhythm, S1, S2 normal, no murmur, rub or gallop   Abdomen:   Soft, non-tender, no organomegaly   Extremities: Extremities normal, atraumatic, no cyanosis,  no edema   Pulses: 2+ and symmetric   Skin: Skin color, texture, turgor normal, no rashes or lesions   Psych: Normal mood and affect   Neurologic: CN II-XII grossly intact      LABS / IMAGING:     Troponin, High Sensitivity (ng/L)   Date Value   01/30/2025 31 (H)   01/30/2025 22   01/08/2025 25 (H)     Lab Results   Component Value Date    PROBNP 485 (H) 01/30/2025    PROBNP 647 (H) 01/07/2025    PROBNP 651 (H) 11/20/2024     Lab Results   Component Value Date     01/31/2025    K 3.4 (L) 01/31/2025     01/31/2025    CO2 26 01/31/2025    BUN 7 01/31/2025    CREATININE 0.6 (L) 01/31/2025    GLUCOSE 104 (H) 01/31/2025    CALCIUM 8.8 01/31/2025    BILITOT 0.8 01/07/2025    ALKPHOS 96 01/07/2025    AST 37 01/07/2025    ALT see below 01/07/2025    LABGLOM >90 01/31/2025    GFRAA >60 02/20/2021    AGRATIO 0.9 (L) 01/07/2025    GLOB 3.1 02/20/2021      Lab Results   Component Value Date    WBC 6.5 01/31/2025    HGB 11.8 (L) 01/31/2025    HCT 34.8 (L) 01/31/2025    MCV 95.7 01/31/2025     01/31/2025     Lab Results   Component Value Date    FERRITIN 442.0 (H) 12/31/2024       RECENT RELEVANT NONCARDIAC IMAGING:    CTPE 1/30/2025  I personally and independently reviewed the images of this study today as part of my assessment of this patient.    -No PE  -Enlarging left hilar mass    CARDIAC IMAGING / TESTING     TELEMETRY since admission (personally reviewed by me today)  AF RVR => AF controlled rate (on diltiazem)    EKG personally interpreted:   Results for orders placed or performed during the hospital encounter of 01/30/25   EKG 12 Lead    Collection Time: 01/30/25 12:03 PM   Result Value Ref Range    Ventricular Rate 126 BPM    QRS Duration 74 ms    Q-T 
opportunity to participate in the patient's care.          JULIA MULLEN MD      D:  02/01/2025 00:06:26     T:  02/01/2025 02:39:24     KENNA/CELIA  Job #:  [id number]     Doc#:  [id number]

## 2025-02-03 NOTE — PROGRESS NOTES
Internal Medicine Progress Note    Patient Name: Ricardo Campbell   Patient : 1960   Date: 2/3/2025   Admit Date: 2025     CC: Irregular Heart Beat (Pt found to be in AFIB w/ RVR by squad. ), Dysphagia (Pt called Dr Waterhouse and reported to them Difficulty swallowing and hasn't been eating or drinking), and Dizziness       Interval History     No overnight events    Patient has no new complaints and is ready for discharge following final steroid infusion later today.    Patient spoke with palliative today and has changed code status to DNR-CC    VSS    No significant labs.    ROS   Review of Systems   Constitutional:  Positive for fatigue and unexpected weight change. Negative for chills and fever.   HENT:  Negative for congestion, rhinorrhea and sore throat.    Respiratory:  Positive for cough. Negative for chest tightness, shortness of breath and wheezing.    Cardiovascular:  Negative for chest pain, palpitations and leg swelling.   Gastrointestinal:  Negative for abdominal pain, diarrhea, nausea and vomiting.   Musculoskeletal:  Positive for back pain.   Neurological:  Positive for weakness. Negative for syncope, light-headedness and numbness.   All other systems reviewed and are negative.         Objective     I/Os:  I/O last 3 completed shifts:  In: 480 [P.O.:480]  Out: 1450 [Urine:1450]      Vital Signs:  Patient Vitals for the past 8 hrs:   BP Temp Temp src Pulse Resp SpO2 Weight   25 1024 -- -- -- -- 18 -- --   25 0951 124/69 98 °F (36.7 °C) Oral 98 18 97 % --   25 0857 -- -- -- 99 21 100 % --   25 0732 -- -- -- -- -- -- 102.8 kg (226 lb 9.6 oz)   25 0625 121/61 -- -- -- -- -- --       Physical Exam:  Physical Exam  Vitals and nursing note reviewed.   Constitutional:       General: He is not in acute distress.     Appearance: He is obese. He is ill-appearing.   HENT:      Head: Normocephalic and atraumatic.      Mouth/Throat:      Mouth: Mucous membranes

## 2025-02-03 NOTE — PROGRESS NOTES
I have personally seen and examined the patient independently. I have reviewed the patient's available data,including medical history and recent test results. Reviewed and discussed note as documented by resident.  I agree with the physical exam findings, assessment and plan. I personally performed a substantive portion of the visit including all aspects of the medical decision making.     Ricardo Campbell is a 65 y.o. male with small cell lung cancer with brain metastases diagnosed in may 2024 s/p cisplatin and  16, and most recently started on immunotherapy presented with intractable N/V and weakness .     Pt stated that one week after his immunotherapy he started to have nausea that has been slowly getting worse. It got much worse after his last dose through the last week. He is not able to keep anything it. He does not have any swallowing difficulties but just food cannot stay down due to nausea and vomiting. He felt very weak and barely able to do much.     Intractable nausea and vomiting   Dysphagia   Hx of MS  Fecal impaction   -Seems multifactorial . It started after the immunotherapy so its likely contributing . He was also found with severe constipation and fecal impaction in the hospital - This was relieved after an enema and aggressive bowel regiment and he actually feels much better. Cont aggressive bowel regimen   -He did not really complain of dysphagia but upon eval by ST he did have difficulty with swallow initiation but with no signs of aspiration   -With his MS neurology was consulted. They are not sure if this is related to MS or cancer. They deferred brain MRI due to hx of metallic object in his eye . He was started on pulse steroids x 3 days . Pt does not think he improved      Metastatic lung cancer - CT showed progression of disease but oncology stated that this does not meet criteria for progression and may indicate an expected response to early treatment  . PT changed to DNR CC before

## 2025-02-03 NOTE — PLAN OF CARE
Problem: Pain  Goal: Verbalizes/displays adequate comfort level or baseline comfort level  Outcome: Adequate for Discharge     Problem: Safety - Adult  Goal: Free from fall injury  Outcome: Adequate for Discharge     Problem: Infection - Adult  Goal: Absence of infection at discharge  Outcome: Adequate for Discharge     Problem: Hematologic - Adult  Goal: Maintains hematologic stability  Outcome: Adequate for Discharge

## 2025-02-03 NOTE — PROGRESS NOTES
Cardiology Consult Service  Daily Progress Note        Admit Date:  1/30/2025  Primary cardiologist: Dr Jacobs [facility]    Reason for Consultation/Chief Complaint: CAF now with RVR    Subjective:     Patient is 64-year-old man, follows with me, with history of smoking (20 years, quit 07/2024), HTN, HLP, MS, metastatic small cell lung cancer (metastasis to the brain status post XRT complicated by intracranial hemorrhage) on chemotherapy at the [facility], PE not on AC due to ICH, persistent AF.      Patient was admitted on 12/23 - 12/31/24 for elective immunotherapy (Imdelltra).  He received 1 dose on 12/23 and developed AF with RVR.  Rate was controlled with medications and was not started on anticoagulation due to recent history of intracranial hemorrhage.    Patient returned to the emergency room on 1/30 with significant dysphagia, decreased p.o. intake and fatigue.  He was admitted for dehydration, fecal impaction, AF now with RVR.  He received IV fluids and diltiazem drip.    Echo 01/2025: Normal    Interval history:  Patient today reports no complaints.  Rate is high normal  bpm.    Objective:     Medications:   dilTIAZem  240 mg Oral Daily    methylPREDNISolone  1,000 mg IntraVENous Daily    [START ON 2/4/2025] metoprolol succinate  50 mg Oral Daily    allopurinol  300 mg Oral Daily    morphine  30 mg Oral BID    tiotropium-olodaterol  2 puff Inhalation Daily    insulin lispro  0-4 Units SubCUTAneous 4x Daily AC & HS    magnesium sulfate  2,000 mg IntraVENous Once    metoclopramide  10 mg IntraVENous Q6H    polyethylene glycol  17 g Oral BID    senna  2 tablet Oral BID    [Held by provider] sodium chloride flush  5-40 mL IntraVENous 2 times per day    [Held by provider] enoxaparin  30 mg SubCUTAneous BID    melatonin  3 mg Oral Nightly    atorvastatin  20 mg Oral Nightly    DULoxetine  30 mg Oral Daily    [Held by provider] pantoprazole  40 mg Oral QAM AC    Vitamin D  1,000 Units Oral Daily

## 2025-02-03 NOTE — PLAN OF CARE
Reviewed discharge instructions with patient.  Reviewed discharge medications including dosing, schedule, indication, and adverse reactions.  Reviewed which medications were already taken today and next dosage due for each medication.       Patient verbalized understanding of all instructions and questions were answered to his. satisfaction.  Signed discharge instructions were given to the patient and a copy placed in the paper-lite chart.  Patient discharged to home per wheelchair with family members.    Pt discharged with all belonging. Medication picked up from pharmacy and sent with pt.     Radha Sheikh RN

## 2025-02-05 LAB
EKG ATRIAL RATE: 100 BPM
EKG DIAGNOSIS: NORMAL
EKG Q-T INTERVAL: 336 MS
EKG QRS DURATION: 70 MS
EKG QTC CALCULATION (BAZETT): 440 MS
EKG R AXIS: 59 DEGREES
EKG T AXIS: 59 DEGREES
EKG VENTRICULAR RATE: 103 BPM

## 2025-05-20 ENCOUNTER — HOSPITAL ENCOUNTER (INPATIENT)
Age: 65
LOS: 2 days | Discharge: HOSPICE | DRG: 871 | End: 2025-05-22
Attending: EMERGENCY MEDICINE | Admitting: INTERNAL MEDICINE
Payer: OTHER GOVERNMENT

## 2025-05-20 ENCOUNTER — APPOINTMENT (OUTPATIENT)
Dept: CT IMAGING | Age: 65
DRG: 871 | End: 2025-05-20
Payer: OTHER GOVERNMENT

## 2025-05-20 DIAGNOSIS — R09.02 HYPOXEMIA: ICD-10-CM

## 2025-05-20 DIAGNOSIS — A41.9 SEPSIS, DUE TO UNSPECIFIED ORGANISM, UNSPECIFIED WHETHER ACUTE ORGAN DYSFUNCTION PRESENT (HCC): Primary | ICD-10-CM

## 2025-05-20 DIAGNOSIS — R53.83 FATIGUE, UNSPECIFIED TYPE: ICD-10-CM

## 2025-05-20 DIAGNOSIS — C34.90 SMALL CELL LUNG CANCER IN ADULT (HCC): ICD-10-CM

## 2025-05-20 PROBLEM — J18.9 ACUTE PNEUMONIA: Status: ACTIVE | Noted: 2025-05-20

## 2025-05-20 LAB
ALBUMIN SERPL-MCNC: 2.9 G/DL (ref 3.4–5)
ALBUMIN/GLOB SERPL: 0.9 {RATIO} (ref 1.1–2.2)
ALP SERPL-CCNC: 81 U/L (ref 40–129)
ALT SERPL-CCNC: 41 U/L (ref 10–40)
ANION GAP SERPL CALCULATED.3IONS-SCNC: 15 MMOL/L (ref 3–16)
AST SERPL-CCNC: 19 U/L (ref 15–37)
BASE EXCESS BLDV CALC-SCNC: 1.7 MMOL/L (ref -3–3)
BASOPHILS # BLD: 0.2 K/UL (ref 0–0.2)
BASOPHILS NFR BLD: 1.7 %
BILIRUB SERPL-MCNC: 0.4 MG/DL (ref 0–1)
BUN SERPL-MCNC: 19 MG/DL (ref 7–20)
CALCIUM SERPL-MCNC: 9.4 MG/DL (ref 8.3–10.6)
CHLORIDE SERPL-SCNC: 99 MMOL/L (ref 99–110)
CO2 BLDV-SCNC: 24 MMOL/L
CO2 SERPL-SCNC: 22 MMOL/L (ref 21–32)
COHGB MFR BLDV: 2.7 % (ref 0–1.5)
CREAT SERPL-MCNC: 0.5 MG/DL (ref 0.8–1.3)
DEPRECATED RDW RBC AUTO: 16.2 % (ref 12.4–15.4)
EOSINOPHIL # BLD: 0 K/UL (ref 0–0.6)
EOSINOPHIL NFR BLD: 0.2 %
FLUAV RNA RESP QL NAA+PROBE: NOT DETECTED
FLUBV RNA RESP QL NAA+PROBE: NOT DETECTED
GFR SERPLBLD CREATININE-BSD FMLA CKD-EPI: >90 ML/MIN/{1.73_M2}
GLUCOSE SERPL-MCNC: 239 MG/DL (ref 70–99)
HCO3 BLDV-SCNC: 22.7 MMOL/L (ref 23–29)
HCT VFR BLD AUTO: 37.9 % (ref 40.5–52.5)
HGB BLD-MCNC: 12.7 G/DL (ref 13.5–17.5)
LACTATE BLDV-SCNC: 1.7 MMOL/L (ref 0.4–2)
LACTATE BLDV-SCNC: 2.3 MMOL/L (ref 0.4–2)
LYMPHOCYTES # BLD: 0.5 K/UL (ref 1–5.1)
LYMPHOCYTES NFR BLD: 4.6 %
MCH RBC QN AUTO: 31.6 PG (ref 26–34)
MCHC RBC AUTO-ENTMCNC: 33.5 G/DL (ref 31–36)
MCV RBC AUTO: 94.3 FL (ref 80–100)
METHGB MFR BLDV: 0.1 %
MONOCYTES # BLD: 0.2 K/UL (ref 0–1.3)
MONOCYTES NFR BLD: 1.5 %
NEUTROPHILS # BLD: 11 K/UL (ref 1.7–7.7)
NEUTROPHILS NFR BLD: 92 %
NT-PROBNP SERPL-MCNC: 1045 PG/ML (ref 0–124)
O2 THERAPY: ABNORMAL
PCO2 BLDV: 26.2 MMHG (ref 40–50)
PH BLDV: 7.55 [PH] (ref 7.35–7.45)
PLATELET # BLD AUTO: 199 K/UL (ref 135–450)
PMV BLD AUTO: 8 FL (ref 5–10.5)
PO2 BLDV: 74.7 MMHG (ref 25–40)
POTASSIUM SERPL-SCNC: 4.1 MMOL/L (ref 3.5–5.1)
PROCALCITONIN SERPL IA-MCNC: 0.19 NG/ML (ref 0–0.15)
PROT SERPL-MCNC: 6.3 G/DL (ref 6.4–8.2)
RBC # BLD AUTO: 4.02 M/UL (ref 4.2–5.9)
SAO2 % BLDV: 96 %
SARS-COV-2 RNA RESP QL NAA+PROBE: NOT DETECTED
SODIUM SERPL-SCNC: 136 MMOL/L (ref 136–145)
WBC # BLD AUTO: 12 K/UL (ref 4–11)

## 2025-05-20 PROCEDURE — 6370000000 HC RX 637 (ALT 250 FOR IP): Performed by: INTERNAL MEDICINE

## 2025-05-20 PROCEDURE — 80053 COMPREHEN METABOLIC PANEL: CPT

## 2025-05-20 PROCEDURE — 2580000003 HC RX 258: Performed by: EMERGENCY MEDICINE

## 2025-05-20 PROCEDURE — 71260 CT THORAX DX C+: CPT

## 2025-05-20 PROCEDURE — 87150 DNA/RNA AMPLIFIED PROBE: CPT

## 2025-05-20 PROCEDURE — 85025 COMPLETE CBC W/AUTO DIFF WBC: CPT

## 2025-05-20 PROCEDURE — 87040 BLOOD CULTURE FOR BACTERIA: CPT

## 2025-05-20 PROCEDURE — 94640 AIRWAY INHALATION TREATMENT: CPT

## 2025-05-20 PROCEDURE — 99285 EMERGENCY DEPT VISIT HI MDM: CPT

## 2025-05-20 PROCEDURE — 2500000003 HC RX 250 WO HCPCS: Performed by: INTERNAL MEDICINE

## 2025-05-20 PROCEDURE — 6360000002 HC RX W HCPCS: Performed by: EMERGENCY MEDICINE

## 2025-05-20 PROCEDURE — 87636 SARSCOV2 & INF A&B AMP PRB: CPT

## 2025-05-20 PROCEDURE — 6360000002 HC RX W HCPCS: Performed by: INTERNAL MEDICINE

## 2025-05-20 PROCEDURE — 83880 ASSAY OF NATRIURETIC PEPTIDE: CPT

## 2025-05-20 PROCEDURE — 83605 ASSAY OF LACTIC ACID: CPT

## 2025-05-20 PROCEDURE — 1200000000 HC SEMI PRIVATE

## 2025-05-20 PROCEDURE — 84145 PROCALCITONIN (PCT): CPT

## 2025-05-20 PROCEDURE — 82803 BLOOD GASES ANY COMBINATION: CPT

## 2025-05-20 PROCEDURE — 36415 COLL VENOUS BLD VENIPUNCTURE: CPT

## 2025-05-20 PROCEDURE — 6360000004 HC RX CONTRAST MEDICATION

## 2025-05-20 PROCEDURE — 96365 THER/PROPH/DIAG IV INF INIT: CPT

## 2025-05-20 RX ORDER — ONDANSETRON 2 MG/ML
4 INJECTION INTRAMUSCULAR; INTRAVENOUS EVERY 6 HOURS PRN
Status: DISCONTINUED | OUTPATIENT
Start: 2025-05-20 | End: 2025-05-22 | Stop reason: HOSPADM

## 2025-05-20 RX ORDER — ATORVASTATIN CALCIUM 10 MG/1
20 TABLET, FILM COATED ORAL NIGHTLY
Status: DISCONTINUED | OUTPATIENT
Start: 2025-05-20 | End: 2025-05-22 | Stop reason: HOSPADM

## 2025-05-20 RX ORDER — SODIUM CHLORIDE 0.9 % (FLUSH) 0.9 %
5-40 SYRINGE (ML) INJECTION PRN
Status: DISCONTINUED | OUTPATIENT
Start: 2025-05-20 | End: 2025-05-22 | Stop reason: HOSPADM

## 2025-05-20 RX ORDER — ALLOPURINOL 300 MG/1
300 TABLET ORAL DAILY
Status: DISCONTINUED | OUTPATIENT
Start: 2025-05-21 | End: 2025-05-22 | Stop reason: HOSPADM

## 2025-05-20 RX ORDER — METOPROLOL SUCCINATE 50 MG/1
50 TABLET, EXTENDED RELEASE ORAL DAILY
Status: DISCONTINUED | OUTPATIENT
Start: 2025-05-21 | End: 2025-05-22 | Stop reason: HOSPADM

## 2025-05-20 RX ORDER — POLYETHYLENE GLYCOL 3350 17 G/17G
17 POWDER, FOR SOLUTION ORAL DAILY PRN
Status: DISCONTINUED | OUTPATIENT
Start: 2025-05-20 | End: 2025-05-22 | Stop reason: HOSPADM

## 2025-05-20 RX ORDER — ACETAMINOPHEN 650 MG/1
650 SUPPOSITORY RECTAL EVERY 6 HOURS PRN
Status: DISCONTINUED | OUTPATIENT
Start: 2025-05-20 | End: 2025-05-22 | Stop reason: HOSPADM

## 2025-05-20 RX ORDER — DILTIAZEM HYDROCHLORIDE 240 MG/1
240 CAPSULE, COATED, EXTENDED RELEASE ORAL DAILY
Status: DISCONTINUED | OUTPATIENT
Start: 2025-05-21 | End: 2025-05-22 | Stop reason: HOSPADM

## 2025-05-20 RX ORDER — ALBUTEROL SULFATE 90 UG/1
2 INHALANT RESPIRATORY (INHALATION) EVERY 4 HOURS PRN
Status: DISCONTINUED | OUTPATIENT
Start: 2025-05-20 | End: 2025-05-21

## 2025-05-20 RX ORDER — DULOXETIN HYDROCHLORIDE 30 MG/1
30 CAPSULE, DELAYED RELEASE ORAL DAILY
Status: DISCONTINUED | OUTPATIENT
Start: 2025-05-21 | End: 2025-05-22 | Stop reason: HOSPADM

## 2025-05-20 RX ORDER — IOPAMIDOL 755 MG/ML
75 INJECTION, SOLUTION INTRAVASCULAR
Status: COMPLETED | OUTPATIENT
Start: 2025-05-20 | End: 2025-05-20

## 2025-05-20 RX ORDER — ONDANSETRON 4 MG/1
4 TABLET, ORALLY DISINTEGRATING ORAL EVERY 8 HOURS PRN
Status: DISCONTINUED | OUTPATIENT
Start: 2025-05-20 | End: 2025-05-22 | Stop reason: HOSPADM

## 2025-05-20 RX ORDER — OXYCODONE HYDROCHLORIDE 5 MG/1
10 TABLET ORAL EVERY 4 HOURS PRN
Status: DISCONTINUED | OUTPATIENT
Start: 2025-05-20 | End: 2025-05-22 | Stop reason: HOSPADM

## 2025-05-20 RX ORDER — ENOXAPARIN SODIUM 100 MG/ML
40 INJECTION SUBCUTANEOUS DAILY
Status: DISCONTINUED | OUTPATIENT
Start: 2025-05-20 | End: 2025-05-22 | Stop reason: HOSPADM

## 2025-05-20 RX ORDER — 0.9 % SODIUM CHLORIDE 0.9 %
1000 INTRAVENOUS SOLUTION INTRAVENOUS ONCE
Status: COMPLETED | OUTPATIENT
Start: 2025-05-20 | End: 2025-05-20

## 2025-05-20 RX ORDER — SODIUM CHLORIDE 0.9 % (FLUSH) 0.9 %
5-40 SYRINGE (ML) INJECTION EVERY 12 HOURS SCHEDULED
Status: DISCONTINUED | OUTPATIENT
Start: 2025-05-20 | End: 2025-05-22 | Stop reason: HOSPADM

## 2025-05-20 RX ORDER — AMANTADINE HYDROCHLORIDE 100 MG/1
100 CAPSULE, GELATIN COATED ORAL 2 TIMES DAILY
Status: DISCONTINUED | OUTPATIENT
Start: 2025-05-20 | End: 2025-05-22 | Stop reason: HOSPADM

## 2025-05-20 RX ORDER — ACETAMINOPHEN 325 MG/1
650 TABLET ORAL EVERY 6 HOURS PRN
Status: DISCONTINUED | OUTPATIENT
Start: 2025-05-20 | End: 2025-05-22 | Stop reason: HOSPADM

## 2025-05-20 RX ORDER — PANTOPRAZOLE SODIUM 40 MG/1
40 TABLET, DELAYED RELEASE ORAL
Status: DISCONTINUED | OUTPATIENT
Start: 2025-05-21 | End: 2025-05-22 | Stop reason: HOSPADM

## 2025-05-20 RX ORDER — GUAIFENESIN 600 MG/1
600 TABLET, EXTENDED RELEASE ORAL 2 TIMES DAILY
Status: DISCONTINUED | OUTPATIENT
Start: 2025-05-20 | End: 2025-05-22 | Stop reason: HOSPADM

## 2025-05-20 RX ORDER — MORPHINE SULFATE 30 MG/1
30 TABLET, FILM COATED, EXTENDED RELEASE ORAL 2 TIMES DAILY
Status: DISCONTINUED | OUTPATIENT
Start: 2025-05-20 | End: 2025-05-22 | Stop reason: HOSPADM

## 2025-05-20 RX ORDER — SODIUM CHLORIDE 9 MG/ML
INJECTION, SOLUTION INTRAVENOUS PRN
Status: DISCONTINUED | OUTPATIENT
Start: 2025-05-20 | End: 2025-05-22 | Stop reason: HOSPADM

## 2025-05-20 RX ADMIN — CEFEPIME 2000 MG: 2 INJECTION, POWDER, FOR SOLUTION INTRAVENOUS at 17:59

## 2025-05-20 RX ADMIN — ENOXAPARIN SODIUM 40 MG: 100 INJECTION SUBCUTANEOUS at 20:59

## 2025-05-20 RX ADMIN — GUAIFENESIN 600 MG: 600 TABLET, MULTILAYER, EXTENDED RELEASE ORAL at 23:18

## 2025-05-20 RX ADMIN — ATORVASTATIN CALCIUM 20 MG: 10 TABLET, FILM COATED ORAL at 20:59

## 2025-05-20 RX ADMIN — MORPHINE SULFATE 30 MG: 30 TABLET, FILM COATED, EXTENDED RELEASE ORAL at 20:59

## 2025-05-20 RX ADMIN — Medication 10 ML: at 21:00

## 2025-05-20 RX ADMIN — VANCOMYCIN HYDROCHLORIDE 1500 MG: 10 INJECTION, POWDER, LYOPHILIZED, FOR SOLUTION INTRAVENOUS at 18:46

## 2025-05-20 RX ADMIN — TIOTROPIUM BROMIDE AND OLODATEROL 2 PUFF: 3.124; 2.736 SPRAY, METERED RESPIRATORY (INHALATION) at 20:52

## 2025-05-20 RX ADMIN — IOPAMIDOL 75 ML: 755 INJECTION, SOLUTION INTRAVENOUS at 17:34

## 2025-05-20 RX ADMIN — SODIUM CHLORIDE 1000 ML: 0.9 INJECTION, SOLUTION INTRAVENOUS at 17:05

## 2025-05-20 RX ADMIN — AMANTADINE HYDROCHLORIDE 100 MG: 100 CAPSULE ORAL at 21:25

## 2025-05-20 ASSESSMENT — PAIN SCALES - GENERAL
PAINLEVEL_OUTOF10: 0
PAINLEVEL_OUTOF10: 9

## 2025-05-20 ASSESSMENT — PAIN - FUNCTIONAL ASSESSMENT: PAIN_FUNCTIONAL_ASSESSMENT: NONE - DENIES PAIN

## 2025-05-20 ASSESSMENT — PAIN DESCRIPTION - LOCATION: LOCATION: CHEST

## 2025-05-20 ASSESSMENT — PAIN DESCRIPTION - DESCRIPTORS: DESCRIPTORS: ACHING

## 2025-05-20 ASSESSMENT — PAIN DESCRIPTION - ORIENTATION: ORIENTATION: MID

## 2025-05-20 NOTE — ED PROVIDER NOTES
[facility] EMERGENCY DEPARTMENT  EMERGENCY DEPARTMENT ENCOUNTER        Pt Name: Ricardo Campbell  MRN: [id number]  Birthdate 1960  Date of evaluation: 5/20/2025  Provider: MARY ANN Umaña Jr  PCP: No primary care provider on file.  Note Started: 6:06 PM EDT 5/20/25       I have seen and evaluated this patient with my supervising physician Claudia Hawley DO.      CHIEF COMPLAINT       Chief Complaint   Patient presents with    Fatigue     Pt with weakness and fatigue x 2 days.  Has lung CA with mets to brain.  Pt reports that he has been feeling more SOB, denies any CP.  Last radiation treatment yesterday.  VSS per /70, 98, 95% RA.       HISTORY OF PRESENT ILLNESS: 1 or more Elements     History from : Patient    Limitations to history : None    Ricardo Campbell is a 65 y.o. male who presents with reports of fatigue, increasing shortness of breath and feeling generally unwell.  He does have a lung cancer with metastasis to the brain.  He is getting radiation treatments and had treatment done yesterday.  States he typically does not get this fatigue after having treatment done.  He is not experiencing any chest pain.  Has had some nausea.  Denying any hemoptysis.    Nursing Notes were all reviewed and agreed with or any disagreements were addressed in the HPI.      SURGICAL HISTORY     Past Surgical History:   Procedure Laterality Date    EYE SURGERY         CURRENTMEDICATIONS       Previous Medications    ALBUTEROL SULFATE HFA (PROVENTIL HFA) 108 (90 BASE) MCG/ACT INHALER    Inhale 2 puffs into the lungs every 4 hours as needed for Wheezing or Shortness of Breath With spacer (and mask if indicated). Thanks.    ALLOPURINOL (ZYLOPRIM) 300 MG TABLET    Take 1 tablet by mouth daily    AMANTADINE (SYMMETREL) 100 MG TABS TABLET    Take 100 mg by mouth 2 times daily    ATORVASTATIN (LIPITOR) 20 MG TABLET    Take 1 tablet by mouth at bedtime    BENZONATATE (TESSALON) 100 MG CAPSULE     5/20/2025  EXAM: CT CHEST PULMONARY EMBOLISM W CONTRAST INDICATION: Pulmonary embolism, shortness of breath, COMPARISON: 1/30/2025 TECHNIQUE: Axial CT imaging obtained through the chest with IV contrast using CT pulmonary angiogram protocol. Axial images, multiplanar reformatted images and maximum intensity projection images were reviewed for CT angiographic technique.  Individualized dose optimization technique was used in order to meet ALARA standards for radiation dose reduction.  In addition to vendor specific dose reduction algorithms, the dose reduction techniques vary based on the specific scanner utilized but frequently include automated exposure control, adjustment of the mA and/or kV according to patient size, and use of iterative reconstruction technique. FINDINGS: DIAGNOSTIC QUALITY: Suboptimal. PULMONARY EMBOLI: No evidence for pulmonary thromboembolism RIGHT HEART STRAIN: None. HEART / GREAT VESSELS: Normal size heart. No pericardial effusion. Thoracic aorta normal diameter without dissection. Left hilar mass encasing the left main pulmonary artery is mildly reduced in size with decreased narrowing of the pulmonary artery compared to 1/30/2025. ADENOPATHY: Enlarging subcarinal lymph node 4.6 x 2.9 cm on series 2, image 129. Previously, this measured 4.0 x 1.7 cm. Additional enlarging subcarinal lymph node on the right. Stable right paratracheal lymph node 1.6 x 1.0 cm. LUNGS AND AIRWAYS: Representative measurement of a portion of the mass at the left hilum and invading the mediastinum is 3.3 x 2.2 cm on series 2, image 123 (previous 7.2 x 4.6 cm).  There are numerous, new bilateral pulmonary nodules, many of which show  central cavitation. Dominant new lesion is located in the left upper lobe 4.5 x 3.7 cm. PLEURA: No pleural effusions or significant pleural thickening. CHEST WALL / LOWER NECK: No acute abnormality UPPER ABDOMEN: No acute abnormality BONES: No definite destructive bone lesion. There is  ordered and pending at this time    Reassessment Exam:   I have reassessed tissue perfusion and hemodynamic status after fluid bolus at this time: Improving      CONSULTS: (Who and What was discussed)  None  Discussion with Other Profesionals : Admitting Team      Social Determinants : None    Records Reviewed : Inpatient Notes      Ddx:   Pneumonia, metabolic disturbance, dehydration, pulmonary embolism    Medical Decision Making:   This is a 65-year-old male with history of lung cancer with metastasis to the brain presenting to the emergency department for complaints of fatigue feeling generally unwell and worsening shortness of breath.  He did have a radiation treatment yesterday and does not typically feel this bad after having treatments.  He does have a cough that he has not been able to get any expectorant from.  He is ill-appearing on my evaluation, tachycardic and tachypneic requiring 2 L of oxygen which is abnormal from his baseline.    Given that the patient is meeting sepsis criteria he was started on vancomycin and cefepime.  His CBC did show leukocytosis of 12, lactic acid elevated at 2.3, procalcitonin elevated at 0.19, CMP with no severe electrolyte disturbance or hepatorenal dysfunction.  CT pulmonary angiogram was negative for acute PE but did show multiple new bilateral pulmonary nodules.  Based off of the hypoxia, sepsis criteria patient will require admission.  He is otherwise stable at this time.  Hospitalist contacted for admission.      I am the Primary Clinician of Record.    FINAL IMPRESSION      1. Sepsis, due to unspecified organism, unspecified whether acute organ dysfunction present (HCC)    2. Fatigue, unspecified type    3. Hypoxemia          DISPOSITION/PLAN     DISPOSITION Decision to Admit    PATIENT REFERRED TO:  No follow-up provider specified.    DISCHARGE MEDICATIONS:  New Prescriptions    No medications on file       DISCONTINUED MEDICATIONS:  Discontinued Medications    No

## 2025-05-20 NOTE — ED PROVIDER NOTES
Emergency Department Attending SUPERVISORY Provider Note  Location: [facility] EMERGENCY DEPARTMENT  5/20/2025     Chief Complaint   Patient presents with    Fatigue     Pt with weakness and fatigue x 2 days.  Has lung CA with mets to brain.  Pt reports that he has been feeling more SOB, denies any CP.  Last radiation treatment yesterday.  VSS per /70, 98, 95% RA.        PATIENT ID:  Ricardo Campbell is a 65 y.o. 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  Has lung cancer with mets to the brain.  Had radiation yesterday.  Has no chest pain, just says he feels like he cannot catch a big breath.  Has been more fatigued as well.  Per EMS, blood pressure was a little soft at 110/70, but stable here in the emergency department..          Vitals:    05/20/25 1605   BP:    Pulse:    Resp:    Temp:    SpO2: 96%        BASIC EXAM:    Focused exam revealed   General: Alert, no acute distress, patient resting comfortably   Skin: Warm, intact, no pallor noted   Head: Normocephalic  Eye: Normal conjunctiva   Cardiac: Normal peripheral perfusion  Respiratory: No acute distress   Musculoskeletal: No deformity, full ROM.   Neurological: Alert and oriented  Psychiatric: Cooperative        CT CHEST PULMONARY EMBOLISM W CONTRAST   Final Result         1. No evidence for pulmonary thromboembolism   2. Multiple new bilateral pulmonary nodules and new dominant left upper lobe mass, many of which with central cavitation most compatible with pulmonary metastases. Septic emboli could have similar appearance, but considered less likely.   3. Primary left hilar mass has decreased in size, but there is progression of subcarinal metastatic lymphadenopathy      ----------PERT DATA----------     criteria met for Severe Sepsis.   Must meet 1:    [] Lactate > 4        or   [] SBP < 90 or MAP < 65 for at        least two readings in the first        hour after fluid bolus        administration      [] Vasopressors initiated (if hypotension persists after fluid resuscitation)        [x] No criteria met for Septic Shock.   Patient Vitals for the past 6 hrs:   BP Temp Pulse Resp SpO2   05/20/25 1542 106/81 98.4 °F (36.9 °C) 97 17 96 %   05/20/25 1600 112/66 -- 89 21 96 %   05/20/25 1605 -- -- -- -- 96 %   05/20/25 1620 103/70 -- 87 21 92 %   05/20/25 1640 102/67 -- 91 21 94 %      Recent Labs     05/20/25  1552   WBC 12.0*   LACTA 2.3*            Time Severe Sepsis Identified: 4:56 PM     Fluid Resuscitation Rational: less than 30mL/kg because h/o lung cancer, CHF    Repeat lactate level: ordered and pending at this time    Reassessment Exam:   Not applicable. Patient does not have septic shock.        PLAN:   -Patient seen and evaluated.  Relevant records reviewed.      4:56 PM EDT  Patient needs severe sepsis criteria; heart rate above 90, respirations of 20, white blood cell count 12.0 with a neutrophilia.  Lactate 2.3 meeting severe sepsis criteria.  In immunocompromised patient and in the interest of early goal-directed therapy, will start broad-spectrum antibiotics, cefepime and vancomycin, for concerns of pneumonia, as he is feeling short of breath, with fatigue.  Blood pressure little soft, once over 67, waiting on BNP, but at this point, will give 1 L IV fluids, and reassess.     Ct as above, masses and new masses on CT scan. No significant PNA, but getting abx as above. Admitted per PA documentation.       Medications   allopurinol (ZYLOPRIM) tablet 300 mg (300 mg Oral Given 5/21/25 0841)   amantadine (SYMMETREL) capsule 100 mg (100 mg Oral Given 5/21/25 0844)   atorvastatin (LIPITOR) tablet 20 mg (20 mg Oral Given 5/20/25 2059)   dilTIAZem (CARDIZEM CD) extended release capsule 240 mg (240 mg

## 2025-05-20 NOTE — ED NOTES
Ricardo Campbell is a 65 y.o. male admitted for  Principal Problem:    Acute pneumonia  Resolved Problems:    * No resolved hospital problems. *  .   Patient Home via EMS transportation with   Chief Complaint   Patient presents with    Fatigue     Pt with weakness and fatigue x 2 days.  Has lung CA with mets to brain.  Pt reports that he has been feeling more SOB, denies any CP.  Last radiation treatment yesterday.  VSS per /70, 98, 95% RA.   .  Patient is alert and Person, Place, Time, and Situation  Patient's baseline mobility: Baseline Mobility: Walker  Code Status: Prior   Cardiac Rhythm:       Is patient on baseline Oxygen: no how many Liters:   Abnormal Assessment Findings: weak, SOB    Isolation: None      NIH Score:    C-SSRS: Risk of Suicide: No Risk  Bedside swallow:        Active LDA's:   Peripheral IV 05/20/25 Left Antecubital (Active)     Patient admitted with a greene: no If the grenee is chronic was it exchanged:  Reason for greene:   Patient admitted with Central Line:  NA . PICC line placement confirmed: YES OR NO:[id number]}   Reason for Central line:   Was central line Inserted from an outside facility:        Family/Caregiver Present yes Any Concerns: no   Restraints no  Sitter no         Vitals: MEWS Score: 1    Vitals:    05/20/25 1620 05/20/25 1640 05/20/25 1700 05/20/25 1800   BP: 103/70 102/67 104/68 122/65   Pulse: 87 91 97 83   Resp: 21 21 16 18   Temp:       TempSrc:       SpO2: 92% 94% 94% 95%       Last documented pain score (0-10 scale) Pain Level: 0  Pain medication administered No.    Pertinent or High Risk Medications/Drips: No.    Pending Blood Product Administration: no    Abnormal labs:   Abnormal Labs Reviewed   CBC WITH AUTO DIFFERENTIAL - Abnormal; Notable for the following components:       Result Value    WBC 12.0 (*)     RBC 4.02 (*)     Hemoglobin 12.7 (*)     Hematocrit 37.9 (*)     RDW 16.2 (*)     Neutrophils Absolute 11.0 (*)     Lymphocytes Absolute 0.5 (*)     All

## 2025-05-21 PROBLEM — E43 SEVERE PROTEIN-CALORIE MALNUTRITION: Status: ACTIVE | Noted: 2025-01-31

## 2025-05-21 LAB
ANION GAP SERPL CALCULATED.3IONS-SCNC: 11 MMOL/L (ref 3–16)
BASOPHILS # BLD: 0 K/UL (ref 0–0.2)
BASOPHILS NFR BLD: 0.2 %
BUN SERPL-MCNC: 14 MG/DL (ref 7–20)
CALCIUM SERPL-MCNC: 8.9 MG/DL (ref 8.3–10.6)
CHLORIDE SERPL-SCNC: 101 MMOL/L (ref 99–110)
CO2 SERPL-SCNC: 20 MMOL/L (ref 21–32)
CREAT SERPL-MCNC: 0.4 MG/DL (ref 0.8–1.3)
DEPRECATED RDW RBC AUTO: 15.9 % (ref 12.4–15.4)
EOSINOPHIL # BLD: 0 K/UL (ref 0–0.6)
EOSINOPHIL NFR BLD: 0.1 %
GFR SERPLBLD CREATININE-BSD FMLA CKD-EPI: >90 ML/MIN/{1.73_M2}
GLUCOSE SERPL-MCNC: 214 MG/DL (ref 70–99)
HCT VFR BLD AUTO: 37.8 % (ref 40.5–52.5)
HGB BLD-MCNC: 12.6 G/DL (ref 13.5–17.5)
LYMPHOCYTES # BLD: 0.8 K/UL (ref 1–5.1)
LYMPHOCYTES NFR BLD: 5.7 %
MCH RBC QN AUTO: 32 PG (ref 26–34)
MCHC RBC AUTO-ENTMCNC: 33.3 G/DL (ref 31–36)
MCV RBC AUTO: 96.2 FL (ref 80–100)
MONOCYTES # BLD: 0.1 K/UL (ref 0–1.3)
MONOCYTES NFR BLD: 0.9 %
NEUTROPHILS # BLD: 13.1 K/UL (ref 1.7–7.7)
NEUTROPHILS NFR BLD: 93.1 %
PLATELET # BLD AUTO: 194 K/UL (ref 135–450)
PLATELET BLD QL SMEAR: ADEQUATE
PMV BLD AUTO: 8.7 FL (ref 5–10.5)
POTASSIUM SERPL-SCNC: 3.8 MMOL/L (ref 3.5–5.1)
RBC # BLD AUTO: 3.93 M/UL (ref 4.2–5.9)
REPORT: NORMAL
SLIDE REVIEW: ABNORMAL
SODIUM SERPL-SCNC: 132 MMOL/L (ref 136–145)
VANCOMYCIN SERPL-MCNC: 13.8 UG/ML
WBC # BLD AUTO: 14.1 K/UL (ref 4–11)

## 2025-05-21 PROCEDURE — 6360000002 HC RX W HCPCS: Performed by: INTERNAL MEDICINE

## 2025-05-21 PROCEDURE — 6370000000 HC RX 637 (ALT 250 FOR IP): Performed by: INTERNAL MEDICINE

## 2025-05-21 PROCEDURE — 2500000003 HC RX 250 WO HCPCS: Performed by: INTERNAL MEDICINE

## 2025-05-21 PROCEDURE — 80202 ASSAY OF VANCOMYCIN: CPT

## 2025-05-21 PROCEDURE — 1200000000 HC SEMI PRIVATE

## 2025-05-21 PROCEDURE — 80048 BASIC METABOLIC PNL TOTAL CA: CPT

## 2025-05-21 PROCEDURE — 85025 COMPLETE CBC W/AUTO DIFF WBC: CPT

## 2025-05-21 PROCEDURE — 94640 AIRWAY INHALATION TREATMENT: CPT

## 2025-05-21 PROCEDURE — 2580000003 HC RX 258: Performed by: INTERNAL MEDICINE

## 2025-05-21 PROCEDURE — 94669 MECHANICAL CHEST WALL OSCILL: CPT

## 2025-05-21 PROCEDURE — 36415 COLL VENOUS BLD VENIPUNCTURE: CPT

## 2025-05-21 RX ORDER — VANCOMYCIN HYDROCHLORIDE 1.5 G/300ML
1500 INJECTION, SOLUTION INTRAVITREAL EVERY 12 HOURS
Status: DISCONTINUED | OUTPATIENT
Start: 2025-05-21 | End: 2025-05-21 | Stop reason: SDUPTHER

## 2025-05-21 RX ORDER — ALBUTEROL SULFATE 0.83 MG/ML
2.5 SOLUTION RESPIRATORY (INHALATION) EVERY 4 HOURS PRN
Status: DISCONTINUED | OUTPATIENT
Start: 2025-05-21 | End: 2025-05-22 | Stop reason: HOSPADM

## 2025-05-21 RX ORDER — LORAZEPAM 2 MG/ML
1 INJECTION INTRAMUSCULAR
Status: DISCONTINUED | OUTPATIENT
Start: 2025-05-21 | End: 2025-05-22 | Stop reason: HOSPADM

## 2025-05-21 RX ORDER — MORPHINE SULFATE 2 MG/ML
2 INJECTION, SOLUTION INTRAMUSCULAR; INTRAVENOUS
Status: DISCONTINUED | OUTPATIENT
Start: 2025-05-21 | End: 2025-05-22 | Stop reason: HOSPADM

## 2025-05-21 RX ORDER — NYSTATIN 100000 [USP'U]/ML
5 SUSPENSION ORAL 4 TIMES DAILY
Status: DISCONTINUED | OUTPATIENT
Start: 2025-05-21 | End: 2025-05-22 | Stop reason: HOSPADM

## 2025-05-21 RX ADMIN — ALBUTEROL SULFATE 2.5 MG: 2.5 SOLUTION RESPIRATORY (INHALATION) at 07:53

## 2025-05-21 RX ADMIN — VANCOMYCIN HYDROCHLORIDE 1500 MG: 10 INJECTION, POWDER, LYOPHILIZED, FOR SOLUTION INTRAVENOUS at 06:05

## 2025-05-21 RX ADMIN — PANTOPRAZOLE SODIUM 40 MG: 40 TABLET, DELAYED RELEASE ORAL at 07:00

## 2025-05-21 RX ADMIN — METOPROLOL SUCCINATE 50 MG: 50 TABLET, EXTENDED RELEASE ORAL at 08:41

## 2025-05-21 RX ADMIN — AMANTADINE HYDROCHLORIDE 100 MG: 100 CAPSULE ORAL at 08:44

## 2025-05-21 RX ADMIN — DILTIAZEM HYDROCHLORIDE 240 MG: 240 CAPSULE, EXTENDED RELEASE ORAL at 08:41

## 2025-05-21 RX ADMIN — DULOXETINE 30 MG: 30 CAPSULE, DELAYED RELEASE ORAL at 08:41

## 2025-05-21 RX ADMIN — MORPHINE SULFATE 30 MG: 30 TABLET, FILM COATED, EXTENDED RELEASE ORAL at 08:42

## 2025-05-21 RX ADMIN — LORAZEPAM 1 MG: 2 INJECTION INTRAMUSCULAR; INTRAVENOUS at 15:23

## 2025-05-21 RX ADMIN — CEFEPIME 2000 MG: 2 INJECTION, POWDER, FOR SOLUTION INTRAVENOUS at 14:30

## 2025-05-21 RX ADMIN — CEFEPIME 2000 MG: 2 INJECTION, POWDER, FOR SOLUTION INTRAVENOUS at 22:34

## 2025-05-21 RX ADMIN — CEFEPIME 2000 MG: 2 INJECTION, POWDER, FOR SOLUTION INTRAVENOUS at 05:13

## 2025-05-21 RX ADMIN — MORPHINE SULFATE 2 MG: 2 INJECTION, SOLUTION INTRAMUSCULAR; INTRAVENOUS at 11:29

## 2025-05-21 RX ADMIN — ALLOPURINOL 300 MG: 300 TABLET ORAL at 08:41

## 2025-05-21 RX ADMIN — TIOTROPIUM BROMIDE AND OLODATEROL 2 PUFF: 3.124; 2.736 SPRAY, METERED RESPIRATORY (INHALATION) at 07:53

## 2025-05-21 RX ADMIN — MORPHINE SULFATE 2 MG: 2 INJECTION, SOLUTION INTRAMUSCULAR; INTRAVENOUS at 20:55

## 2025-05-21 RX ADMIN — MORPHINE SULFATE 2 MG: 2 INJECTION, SOLUTION INTRAMUSCULAR; INTRAVENOUS at 12:47

## 2025-05-21 RX ADMIN — ENOXAPARIN SODIUM 40 MG: 100 INJECTION SUBCUTANEOUS at 08:42

## 2025-05-21 RX ADMIN — GUAIFENESIN 600 MG: 600 TABLET, MULTILAYER, EXTENDED RELEASE ORAL at 08:41

## 2025-05-21 RX ADMIN — SODIUM CHLORIDE: 0.9 INJECTION, SOLUTION INTRAVENOUS at 05:11

## 2025-05-21 RX ADMIN — MORPHINE SULFATE 2 MG: 2 INJECTION, SOLUTION INTRAMUSCULAR; INTRAVENOUS at 15:23

## 2025-05-21 RX ADMIN — Medication 10 ML: at 08:50

## 2025-05-21 ASSESSMENT — PAIN SCALES - GENERAL: PAINLEVEL_OUTOF10: 8

## 2025-05-21 NOTE — CONSULTS
Pharmacy Note  Vancomycin Consult    Ricardo Campbell is a 65 y.o. male started on Vancomycin for PNA; consult received from Dr. Delgadillo to manage therapy. Also receiving the following antibiotics: Cefepime.    Allergies:  Bee venom and Chloraseptic [phenol]     Recent Labs     05/20/25  1552   CREATININE 0.5*       Recent Labs     05/20/25  1552   WBC 12.0*       Estimated Creatinine Clearance: 161 mL/min (A) (based on SCr of 0.5 mg/dL (L)).      Intake/Output Summary (Last 24 hours) at 5/20/2025 2107  Last data filed at 5/20/2025 1900  Gross per 24 hour   Intake 1100 ml   Output 620 ml   Net 480 ml       Wt Readings from Last 1 Encounters:   05/20/25 83 kg (183 lb)         Body mass index is 24.84 kg/m².  Goal Vancomycin AUC: 400-600     Assessment/Plan:  Will initiate Vancomycin 1500 mg IV every 12 hours. Calculated Vancomycin AUC = 432 mg/L*h with an estimated steady-state vancomycin trough = 12 mcg/mL. Vancomycin level ordered for 5/21 @1700. Timing of trough level will be determined based on culture results, renal function, and clinical response.     Thank you for the consult.  Avelina Lomax, [facility], R.Ph. 5/20/2025 9:08 PM      Vancomycin Day of Therapy 2  Indication: pna  Micro: ngtd  Current Dosing Method: Bayesian-Guided AUC Dosing  Therapeutic Goal: -600 mg/L*hr  Recent Labs     05/20/25  1552 05/21/25  0624 05/21/25  0625 05/21/25  1512   VANCORANDOM  --   --   --  13.8   WBC 12.0* 14.1*  --   --    CREATININE 0.5*  --  0.4*  --    Estimated Creatinine Clearance: 202 mL/min (A) (based on SCr of 0.4 mg/dL (L)).  Current Dose / Plan:   Continue Vancomycin 1500 mg IV q12h.   Kinetics predict an AUC = 502 mg/L*h with an estimated steady-state vancomycin trough = 12.3 mcg/mL.  Will continue to monitor clinical condition and make adjustments to regimen as appropriate.    Yordan Alford, PharmD 5/21/2025 3:59 PM    Vancomycin Day of Therapy 3  Indication: pna  Micro: ngtd  Current Dosing Method:

## 2025-05-21 NOTE — PLAN OF CARE
Problem: Safety - Adult  Goal: Free from fall injury  Outcome: Progressing   Bed in lowest position, wheels locked, 2/4 side rails up, nonskid footwear on. Bed/ chair check alarm in place, call light within reach. Pt instructed to call out when needing assistance. Pt stated understanding.     Problem: Discharge Planning  Goal: Discharge to home or other facility with appropriate resources  Outcome: Progressing     Problem: Pain  Goal: Verbalizes/displays adequate comfort level or baseline comfort level  Outcome: Progressing  Pt scoring pain on 0-10 scale. Pain medications given per MAR. Pt instructed to call out when pain level increasing. Call light within reach.     Problem: Skin/Tissue Integrity  Goal: Skin integrity remains intact  Description: 1.  Monitor for areas of redness and/or skin breakdown2.  Assess vascular access sites hourly3.  Every 4-6 hours minimum:  Change oxygen saturation probe site4.  Every 4-6 hours:  If on nasal continuous positive airway pressure, respiratory therapy assess nares and determine need for appliance change or resting period  Outcome: Progressing

## 2025-05-21 NOTE — PROGRESS NOTES
[facility] Medicine Progress Note  V 5.17      Date of Admission: 5/20/2025    Hospital Day: 2      Chief Admission Complaint:  fatigue, SOB    Subjective:  very weak today. Persistent, ineffective cough. Ongoing goals of care discussions    Presenting Admission History:       65 y.o. male who presented to [facility] with fatigue, SOB.  PMHx significant for lung cancer, HTN, HLD, GERD. Patient has lung cancer is restarted chemo yesterday. He completed radiation prior to this. Even before he got his chemo, he was having increasing weakness for the past few days. He has had a worsening cough as well. He has a weak cough overall and has had difficulty clearing mucus from his lungs. He denies fevers or chills. He denies recent sick contacts.     Assessment/Plan:      Pneumonia  - Health Care Associated Pneumonia.    - possibly Gram Negative   - Started on Vancomycin and Cefepime on 05/21/25    Sepsis - POA  - W/ Leukocytosis, Tachycardia, and Elevated Lactate  - Secondary to above infection  - s/p IVF  - Started on empiric vanc, cefepime on 05/21/25    Lung cancer  - on active treatment  - continue pain control  - continue inhalers  - hem/onc consulted, awaiting recs  - palliative care consulted    Hypertension   - Typically controlled on home regimen   - Continue metoprolol, cardizem    Hyperlipidemia   - Typically controlled on home regimen   - Continue statin   - Follow up with PCP outpatient for medication initiation and/or adjustment as needed.      GERD   - without active signs of GI Bleeding and/or symptoms of dysphagia and/or odynophagia  - No evidence of active Peptic Ulcer Disease without history of GI Bleed  - Controlled on home PPI - continued      Ongoing threat to life and/or bodily function without ongoing treatment due to:  pneumonia    Consults:      PHARMACY TO DOSE VANCOMYCIN  IP CONSULT TO HEM/ONC  IP CONSULT TO PALLIATIVE CARE  IP CONSULT TO SPIRITUAL SERVICES  IP CONSULT TO

## 2025-05-21 NOTE — PROGRESS NOTES
05/21/25 1510   Encounter Summary   Encounter Overview/Reason Spiritual/Emotional Needs   Service Provided For Patient and family together   Referral/Consult From Nurse   Last Encounter  05/21/25  (Patient and family just had visit by their . No needs at the moment. DS)   Complexity of Encounter Low   Begin Time 1505   End Time  1510   Total Time Calculated 5 min   Assessment/Intervention/Outcome   Assessment Unable to assess

## 2025-05-21 NOTE — PROGRESS NOTES
4 Eyes Skin Assessment and Patient belongings     The patient is being assess for  Admission    I agree that 2 Nurses have performed a thorough Head to Toe Skin Assessment on the patient. ALL assessment sites listed below have been assessed.       Areas assessed by both nurses: ***  [x]   Head, Face, and Ears   [x]   Shoulders, Back, and Chest  [x]   Arms, Elbows, and Hands   [x]   Coccyx, Sacrum, and IschIum  [x]   Legs, Feet, and Heels        Does the Patient have Skin Breakdown?  No         Yvon Prevention initiated:  No   Wound Care Orders initiated:  No      [facility] nurse consulted for Pressure Injury (Stage 3,4, Unstageable, DTI, NWPT, and Complex wounds), New and Established Ostomies:  No      I agree that 2 Nurses have reviewed patient belongings with the patient/family and documented in the flowsheet upon admission or transfer to the unit.     Belongings  Dental Appliances: None  Vision - Corrective Lenses: Eyeglasses, At bedside  Hearing Aid: None  Clothing: Undergarments, Shirt, At bedside  Jewelry: None  Body Piercings Removed: Yes  Electronic Devices: Cell Phone, At bedside  Weapons (Notify Protective Services/Security): None  Other Valuables: Wallet, At home  Home Medications: None  Valuables Given To: Family (Comment)  Provide Name(s) of Who Valuable(s) Were Given To: Kia  Patient approves for provider to speak to responsible person post operatively: Yes       Nurse 1 eSignature: Electronically signed by Genna Saleh RN on 5/20/25 at 10:43 PM EDT    **SHARE this note so that the co-signing nurse is able to place an eSignature**    Nurse 2 eSignature: {Esignature:[id number]}

## 2025-05-21 NOTE — PROGRESS NOTES
Comprehensive Nutrition Assessment    Type and Reason for Visit:  Initial, Positive nutrition screen    Nutrition Recommendations/Plan:   Continue regular diet.  Encourage PO intakes as tolerated.  Ensure ONS once/day - chocolate.   Monitor pertinent labs, bowel habits, weight, N/V, supplement acceptance, clinical progression.       Malnutrition Assessment:  Malnutrition Status:  Severe malnutrition (05/21/25 1310)    Context:  Chronic Illness     Findings of the 6 clinical characteristics of malnutrition:  Energy Intake:  75% or less estimated energy requirements for 1 month or longer  Weight Loss:  Greater than 7.5% over 3 months     Body Fat Loss:  Severe body fat loss Orbital   Muscle Mass Loss:  Severe muscle mass loss Temples (temporalis), Clavicles (pectoralis & deltoids)  Fluid Accumulation:  Unable to assess     Strength:  Not Performed    Nutrition Assessment:    Patient seen for positive nutrition screen (weight loss and decreased appetite). Admitted for PNA, sepsis. PMHx significant for lung cancer, HTN, HLD, GERD. Patient has lung cancer mets to brain, restarted chemo yesterday. Previously was on radiation. Pt currently on regular diet. Patient seen resting in bed. Spoke with pts family in room r/t patient having difficulty talking due to weakness and SOB. Family reports he has been drinking Ensure ONS daily. Has not been taking food in PO. Will send Ensure once/day per home regimen. Significant weight loss of -43lbs x3 months (-19%) noted per EMR. Per chart review, pt interested in hospice. Pt and family with no nutritional questions at this time. Will continue to monitor.    Nutrition Related Findings:    Na 132, glucose 214. Wound Type: None       Current Nutrition Intake & Therapies:    Average Meal Intake: Refusing to eat  Average Supplements Intake: None Ordered  ADULT DIET; Regular  ADULT ORAL NUTRITION SUPPLEMENT; Dinner; Standard High Calorie/High Protein Oral Supplement    Anthropometric

## 2025-05-21 NOTE — ACP (ADVANCE CARE PLANNING)
Advance Care Planning   General Advance Care Planning (ACP) Conversation    Date of Conversation: 5/20/2025  Conducted with: Patient with Decision Making Capacity  Other persons present: Daughter Franchesca    Healthcare Decision Maker:    Primary Decision Maker: franchesca zhu - Child - [phone]    Secondary Decision Maker: Gloria Campbell - Spouse - [phone]    Supplemental (Other) Decision Maker (Active): Larry Keith - Child - [phone]    Today we documented Decision Maker(s) consistent with Legal Next of Kin hierarchy.  Content/Action Overview:  Has NO ACP documents-Information provided  Reviewed DNR/DNI and patient confirms current DNR status - completed forms on file (place new order if needed)      Length of Voluntary ACP Conversation in minutes:  <16 minutes (Non-Billable)    Nuvia De La O RN

## 2025-05-21 NOTE — CARE COORDINATION
Spoke with Archana VIVAS with [facility] and she reports pt currently receives 20 hrs of aid in addition to his skilled PT/OT. She states if pt elects to go hospice and comes home the skilled care would end but pt could get increased aid services. Will continue to keep them updated. Nuvia De La O RN

## 2025-05-21 NOTE — H&P
[facility] Medicine History & Physical    V 5.1    Date of Admission: 5/20/2025    Date of Service:  Pt seen/examined on 05/20/25     [x]Admitted to Inpatient with expected LOS greater than two midnights due to medical therapy.  []Placed in Observation status.    Chief Admission Complaint:  fatigue, SOB    Presenting Admission History:      65 y.o. male who presented to [facility] with fatigue, SOB.  PMHx significant for lung cancer, HTN, HLD, GERD. Patient has lung cancer is restarted chemo yesterday. He completed radiation prior to this. Even before he got his chemo, he was having increasing weakness for the past few days. He has had a worsening cough as well. He has a weak cough overall and has had difficulty clearing mucus from his lungs. He denies fevers or chills. He denies recent sick contacts.    Assessment/Plan:      Pneumonia  - Health Care Associated Pneumonia.    - possibly Gram Negative   - Started on Vancomycin and Cefepime on 05/20/25    Sepsis - POA  - W/ Leukocytosis, Tachycardia, and Elevated Lactate  - Secondary to above infection  - Continue IVF as appropriate   - Started on empiric vanc, cefepime on 05/20/25    Lung cancer  - on active treatment  - continue pain control  - continue inhalers  - hem/onc consulted, awaiting recs    Hypertension   - Typically controlled on home regimen   - Continue metoprolol, cardizem    Hyperlipidemia   - Typically controlled on home regimen   - Continue statin   - Follow up with PCP outpatient for medication initiation and/or adjustment as needed.      GERD   - without active signs of GI Bleeding and/or symptoms of dysphagia and/or odynophagia  - No evidence of active Peptic Ulcer Disease without history of GI Bleed  - Controlled on home PPI - continued    Discussed management and the need for Hospitalization of the patient w/ the Emergency Department Provider: Dr. Hawley    CXR: I have reviewed the CXR with the following interpretation:  MD Latasha   Amantadine (SYMMETREL) 100 MG TABS tablet Take 100 mg by mouth 2 times daily   Yes Latasha Rice MD   rOPINIRole (REQUIP) 2 MG tablet Take 1 tablet by mouth nightly as needed   Yes Latasha Rice MD   docusate sodium (COLACE) 100 MG capsule Take 2 capsules by mouth daily Takes at night   Yes Latasha Rice MD   dilTIAZem (CARDIZEM CD) 240 MG extended release capsule Take 1 capsule by mouth daily 12/26/24  Yes Fariha Beaver MD   nitroGLYCERIN (NITROSTAT) 0.4 MG SL tablet Place 1 tablet under the tongue every 5 minutes as needed for Chest pain up to max of 3 total doses. If no relief after 1 dose, call 911. 12/25/24  Yes Fariha Beaver MD   fluticasone (FLONASE) 50 MCG/ACT nasal spray 1 spray by Each Nostril route daily as needed for Rhinitis   Yes ProviderLatasha MD   atorvastatin (LIPITOR) 20 MG tablet Take 1 tablet by mouth at bedtime   Yes Latasha Rice MD   benzonatate (TESSALON) 100 MG capsule Take 1 capsule by mouth 3 times daily as needed for Cough   Yes Latasha Rice MD   DULoxetine (CYMBALTA) 30 MG extended release capsule Take 1 capsule by mouth daily   Yes Latasha Rice MD   oxyCODONE (ROXICODONE) 5 MG immediate release tablet Take 2 tablets by mouth every 4 hours as needed for Pain.   Yes Latasha Rice MD   omeprazole (PRILOSEC) 20 MG delayed release capsule Take 1 capsule by mouth daily   Yes ProviderLatasha MD   loratadine (CLARITIN) 10 MG tablet Take 1 tablet by mouth daily   Yes ProviderLatasha MD   vitamin D (CHOLECALCIFEROL) 1000 UNIT TABS tablet Take 3 tablets by mouth daily   Yes Latasha Rice MD   albuterol sulfate HFA (PROVENTIL HFA) 108 (90 BASE) MCG/ACT inhaler Inhale 2 puffs into the lungs every 4 hours as needed for Wheezing or Shortness of Breath With spacer (and mask if indicated). Thanks. 1/2/17 1/24/25  Gordo Jose Jr., MD       Labs: Personally reviewed and interpreted for

## 2025-05-21 NOTE — CONSULTS
Consult Placed     Who: KEKE NIEVES  Date:5/21/2025  Time:0734     Electronically signed by Chicho Self on 5/21/2025 at 7:33 AM

## 2025-05-21 NOTE — CARE COORDINATION
Case Management Assessment  Initial Evaluation    Date/Time of Evaluation: 5/21/2025 10:39 AM  Assessment Completed by: Nuvia De La O RN    If patient is discharged prior to next notation, then this note serves as note for discharge by case management.    Patient Name: Ricardo Campbell                   YOB: 1960  Diagnosis: Hypoxemia [R09.02]  Fatigue, unspecified type [R53.83]  Acute pneumonia [J18.9]  Sepsis, due to unspecified organism, unspecified whether acute organ dysfunction present (HCC) [A41.9]                   Date / Time: 5/20/2025  3:31 PM    Patient Admission Status: Inpatient   Readmission Risk (Low < 19, Mod (19-27), High > 27): Readmission Risk Score: 17.4    Current PCP: No primary care provider on file.  PCP verified by ? Yes [facility])    Chart Reviewed: Yes      History Provided by: Patient  Patient Orientation: Alert and Oriented    Patient Cognition: Alert    Hospitalization in the last 30 days (Readmission):  No    If yes, Readmission Assessment in  Navigator will be completed.    Advance Directives:      Code Status: Limited   Patient's Primary Decision Maker is: Legal Next of Kin    Primary Decision Maker: audrafranchesca - Child - [phone]    Secondary Decision Maker: Gloria Campbell - Spouse - [phone]    Supplemental (Other) Decision Maker (Active): Larry Keith - Child - [phone]    Discharge Planning:    Patient lives with: Spouse/Significant Other Type of Home: House  Primary Care Giver: Self  Patient Support Systems include: Spouse/Significant Other, Family Members   Current Financial resources:  ([facility]), Medicare  Current community resources: None  Current services prior to admission: Durable Medical Equipment, Home Care, C-pap, Oxygen Therapy            Current DME: Walker, Wheelchair            Type of Home Care services:  Aide Services, PT, Nursing Services, OT    ADLS  Prior functional level: Assistance with the following:,

## 2025-05-21 NOTE — CONSULTS
PALLIATIVE MEDICINE CONSULTATION     Patient name:Ricardo Campbell   MRN:[id number]    :1960  Room/Bed:0356/0356-01   LOS: 1 day         Date of consult:2025    Inpatient consult to Palliative Care  Consult performed by: Marla Spencer APRN - CNP  Consult ordered by: Wilmar Delgadillo MD      Inpatient consult to Palliative Care  Consult performed by: Marla Spencer APRN - CNP  Consult ordered by: Alex Marie DO          Consult ordered for: lung cancer    ASSESSMENT/RECOMMENDATIONS     65 y.o. male with stage IV SCLC (2024), COPD, MS, neuropathy and depression who lives at home with his ill wife.  PPS ~50.  Patient has needed help to get into his scooter recently.  Has decreased appetite and body weight is down 19% in past 5 months. BMI 24, albumin 2.9.  Reports difficulty swallowing due to his MS. Patient currently receiving treatment for his cancer at [facility] and is followed by [facility]'s palliative care.  Patient was admitted with sepsis, HAP and worsening fatigue.  He feels his pain is well managed.  He is agitated and intermittently confused.  He is on room air. He looks uncomfortable with breathing and constant coughing and clearing of throat.     Number of hospital visits in past 12 months:  6 + several apparent [facility] visits  Last hospitalization:  25      Goals of Care:  Longevity.  Patient is currently stating he wants to keep treating his cancer even if the treatment is worse than the disease because stopping treatment would be giving up.  He wants full work up and treatment of his acute issues as well.  Earlier today he was asking for hospice because he thought he was dying, but has now changed his mind (he is intermittently confused today).  Wife/HPOA en route to hospital. Per daughter, HPOA has historically supported his wish for longevity.  Patient states he would consider stopping aggressive treatment and entering hospice care if specifically recommended by  normal or reduced; Occasional assist; LOC full/confusion   [] 30%  Bed bound, Extensive disease; Total care; Intake reduced; LOC full/confusion   [] 20%  Bed bound; Extensive disease; Total care; Intake minimal; Drowsy/coma   [] 10%  Bed bound; Extensive disease; Total care; Mouth care only; Drowsy/coma   []  0%   Death       Home med list and hospital medications reviewed in chart as of 5/21/2025     EXAM     Vitals:    05/21/25 0753   BP:    Pulse: 98   Resp: 18   Temp:    SpO2: 97%                  OBJECTIVE   /73   Pulse 98   Temp 98.1 °F (36.7 °C) (Oral)   Resp 18   Ht 1.828 m (5' 11.97\")   Wt 83 kg (183 lb)   SpO2 97%   BMI 24.84 kg/m²   I/O last 3 completed shifts:  In: 1200 [P.O.:100; IV Piggyback:1100]  Out: 1120 [Urine:1120]  No intake/output data recorded.      Palliative Medicine Interventions:    patient/family support  Goals of Care discussions with patient/surrogate  Spiritual Interventions:  assessment and discussion        DATA:  Current labs in the epic chart reviewed as of 5/21/2025   Review of previous notes, admits, labs, radiology and testing relevant to this consult done in this chart today 5/21/2025    Medical Decision Making:  The following items were considered in medical decision making:  Review of prior external note(s) from each unique source relevant to today's visit: Hospitalist, Case management, PT/OT/ST, oncology  Discussion of management or test with external physician/qualified health care professional: Hospitalist, Case management,    Unique test results reviewed: CBC and BMP, Liver Studies, Cardiac studies, echo, Ct chest       Risk of Complications/Morbidity: High   Illness(es)/ Infection present that pose threat to bodily function.   There is potential for severe exacerbation of condition/side effects of treatment.  Therapy requires intensive monitoring for toxicity        Palliative Medicine Provider (MD/NP)  Advance Care Planning (ACP) Conversation      Date of

## 2025-05-22 VITALS
TEMPERATURE: 98.1 F | HEART RATE: 104 BPM | OXYGEN SATURATION: 94 % | RESPIRATION RATE: 30 BRPM | WEIGHT: 183 LBS | DIASTOLIC BLOOD PRESSURE: 73 MMHG | HEIGHT: 72 IN | BODY MASS INDEX: 24.79 KG/M2 | SYSTOLIC BLOOD PRESSURE: 109 MMHG

## 2025-05-22 PROCEDURE — 6360000002 HC RX W HCPCS: Performed by: INTERNAL MEDICINE

## 2025-05-22 PROCEDURE — 2580000003 HC RX 258: Performed by: INTERNAL MEDICINE

## 2025-05-22 PROCEDURE — 6370000000 HC RX 637 (ALT 250 FOR IP): Performed by: INTERNAL MEDICINE

## 2025-05-22 RX ORDER — MORPHINE SULFATE 100 MG/5ML
10 SOLUTION ORAL
Qty: 30 ML | Refills: 0 | Status: SHIPPED | OUTPATIENT
Start: 2025-05-22 | End: 2025-05-27

## 2025-05-22 RX ORDER — HYOSCYAMINE SULFATE 0.12 MG/1
0.12 TABLET SUBLINGUAL EVERY 4 HOURS PRN
Qty: 90 TABLET | Refills: 0 | Status: SHIPPED | OUTPATIENT
Start: 2025-05-22

## 2025-05-22 RX ORDER — FENTANYL 50 UG/1
1 PATCH TRANSDERMAL
Refills: 0 | Status: DISCONTINUED | OUTPATIENT
Start: 2025-05-22 | End: 2025-05-22 | Stop reason: HOSPADM

## 2025-05-22 RX ORDER — ATROPINE SULFATE 10 MG/ML
1 SOLUTION/ DROPS OPHTHALMIC EVERY 4 HOURS PRN
Status: DISCONTINUED | OUTPATIENT
Start: 2025-05-22 | End: 2025-05-22 | Stop reason: HOSPADM

## 2025-05-22 RX ORDER — LORAZEPAM 0.5 MG/1
0.5 TABLET ORAL
Qty: 60 TABLET | Refills: 0 | Status: SHIPPED | OUTPATIENT
Start: 2025-05-22 | End: 2025-05-27

## 2025-05-22 RX ORDER — FENTANYL 50 UG/1
1 PATCH TRANSDERMAL
Qty: 10 PATCH | Refills: 0 | Status: SHIPPED | OUTPATIENT
Start: 2025-05-25 | End: 2025-06-24

## 2025-05-22 RX ADMIN — MORPHINE SULFATE 2 MG: 2 INJECTION, SOLUTION INTRAMUSCULAR; INTRAVENOUS at 09:06

## 2025-05-22 RX ADMIN — LORAZEPAM 1 MG: 2 INJECTION INTRAMUSCULAR; INTRAVENOUS at 19:07

## 2025-05-22 RX ADMIN — MORPHINE SULFATE 2 MG: 2 INJECTION, SOLUTION INTRAMUSCULAR; INTRAVENOUS at 14:24

## 2025-05-22 RX ADMIN — MORPHINE SULFATE 2 MG: 2 INJECTION, SOLUTION INTRAMUSCULAR; INTRAVENOUS at 06:29

## 2025-05-22 RX ADMIN — ATROPINE SULFATE 1 DROP: 10 SOLUTION/ DROPS OPHTHALMIC at 13:24

## 2025-05-22 RX ADMIN — MORPHINE SULFATE 2 MG: 2 INJECTION, SOLUTION INTRAMUSCULAR; INTRAVENOUS at 00:30

## 2025-05-22 RX ADMIN — LORAZEPAM 1 MG: 2 INJECTION INTRAMUSCULAR; INTRAVENOUS at 15:56

## 2025-05-22 RX ADMIN — MORPHINE SULFATE 2 MG: 2 INJECTION, SOLUTION INTRAMUSCULAR; INTRAVENOUS at 15:55

## 2025-05-22 RX ADMIN — MORPHINE SULFATE 2 MG: 2 INJECTION, SOLUTION INTRAMUSCULAR; INTRAVENOUS at 04:48

## 2025-05-22 RX ADMIN — MORPHINE SULFATE 2 MG: 2 INJECTION, SOLUTION INTRAMUSCULAR; INTRAVENOUS at 12:05

## 2025-05-22 RX ADMIN — CEFEPIME 2000 MG: 2 INJECTION, POWDER, FOR SOLUTION INTRAVENOUS at 06:31

## 2025-05-22 RX ADMIN — MORPHINE SULFATE 2 MG: 2 INJECTION, SOLUTION INTRAMUSCULAR; INTRAVENOUS at 19:06

## 2025-05-22 RX ADMIN — LORAZEPAM 1 MG: 2 INJECTION INTRAMUSCULAR; INTRAVENOUS at 00:30

## 2025-05-22 RX ADMIN — ATROPINE SULFATE 1 DROP: 10 SOLUTION/ DROPS OPHTHALMIC at 18:14

## 2025-05-22 RX ADMIN — MORPHINE SULFATE 2 MG: 2 INJECTION, SOLUTION INTRAMUSCULAR; INTRAVENOUS at 17:01

## 2025-05-22 RX ADMIN — LORAZEPAM 1 MG: 2 INJECTION INTRAMUSCULAR; INTRAVENOUS at 16:59

## 2025-05-22 RX ADMIN — MORPHINE SULFATE 2 MG: 2 INJECTION, SOLUTION INTRAMUSCULAR; INTRAVENOUS at 02:27

## 2025-05-22 RX ADMIN — Medication 1500 MG: at 04:51

## 2025-05-22 ASSESSMENT — PAIN DESCRIPTION - LOCATION
LOCATION: GENERALIZED
LOCATION: GENERALIZED

## 2025-05-22 NOTE — PROGRESS NOTES
[facility]    Spoke with pts daughter Renee this morning, plan will be to meet with them btw 1-2 pm today to discuss hospice services. GRISELDA Edwards made aware. HOC liaison to FU after meeting.    Lourdes Sands, BSN, RN  [phone]

## 2025-05-22 NOTE — PROGRESS NOTES
Patient discharged home with hospice. IV removed with no complications, telemetry removed CM[facility] notified. All belongings sent home with family. Patient transported via [facility].   Patient provided 1 mg of Ativan and 2 mg of morphine prior to transport.  Home medications sent with daughter.  Quinones not placed at D/C per daughters request, as patient is not urinating.

## 2025-05-22 NOTE — PROGRESS NOTES
Hospice [facility]    Met with pts spouse, sister, and daughter to discuss hospice philosophy, levels of care, locations of care, and services. Pt minimally responsive and unable to participate in conversation. Discussed the hospice inpt units and their short term stay for symptom management, pt would be appropriate for that higher level of care. However, family declines the hospice inpt unit and wishes to take pt home today. No DME's will be needed currently as pt has what he needs in the home already. Requested meds to bed from MD along with the discharge order, and the DNRCC form. MD to place order for a greene catheter to be placed at time of discharge. Transport has been set up with [facility] for 1830 this pm. GRISELDA Edwards, and unit RN made aware. [place]charge packet completed and [place] [facility] [place].     Lourdes Sands, BSN, RN  [phone]

## 2025-05-22 NOTE — PROGRESS NOTES
Physician Progress Note      PATIENT:               MARCELINO LUCAS  CSN #:                  [id number]  :                       1960  ADMIT DATE:       2025 3:31 PM  DISCH DATE:  RESPONDING  PROVIDER #:        Wilmar JACOBSON MD          QUERY TEXT:    Please clarify the patient?s nutritional status:    The clinical indicators include:  RD : \"Malnutrition Status:  Severe malnutrition (25 1310)  Context:  Chronic Illness  Findings of the 6 clinical characteristics of malnutrition:  Energy Intake:  75% or less estimated energy requirements for 1 month or   longer  Weight Loss:  Greater than 7.5% over 3 months  Body Fat Loss:  Severe body fat loss Orbital  Muscle Mass Loss:  Severe muscle mass loss Temples (temporalis), Clavicles   (pectoralis & deltoids)  Fluid Accumulation:  Unable to assess   Strength:  Not Performed.\"  Treatment: RD consult, Adult diet; Regular, oral nutrition supplement  Options provided:  -- Protein calorie malnutrition mild  -- Protein calorie malnutrition moderate  -- Protein calorie malnutrition severe  -- Underweight with BMI ***  -- Cachexia  -- Abnormal weight loss  -- Other - I will add my own diagnosis  -- Disagree - Not applicable / Not valid  -- Disagree - Clinically unable to determine / Unknown  -- Refer to Clinical Documentation Reviewer    PROVIDER RESPONSE TEXT:    This patient has severe protein calorie malnutrition.    Query created by: Trina Brday on 2025 1:59 PM      Electronically signed by:  Wilmar JACOBSON MD 2025 10:24 AM

## 2025-05-22 NOTE — CONSULTS
[facility]    Consult Note      Requesting Physician:  Dr. Delgadillo    CHIEF COMPLAINT:  small cell lung cancer      HISTORY OF PRESENT ILLNESS:    Mr. Campbell  is a 65 y.o. male we are seeing in consultation for Small cell lung carcinoma.  He was initially diagnosed in July 2024.  He is now on his third line therapy.  He is not doing well.  He cannot give a history.  He has had moderate to significant pain.    ICD-10-CM    1. Sepsis, due to unspecified organism, unspecified whether acute organ dysfunction present (HCC)  A41.9       2. Fatigue, unspecified type  R53.83       3. Hypoxemia  R09.02              Past Medical History:  Past Medical History:   Diagnosis Date    Hypercholesteremia     Hypertension     MS (multiple sclerosis) (HCC)     Small cell carcinoma metastatic to both lungs (HCC)        Past Surgical History:  Past Surgical History:   Procedure Laterality Date    EYE SURGERY         Current Medications:  Current Facility-Administered Medications   Medication Dose Route Frequency Provider Last Rate Last Admin    albuterol (PROVENTIL) (2.5 MG/3ML) 0.083% nebulizer solution 2.5 mg  2.5 mg Nebulization Q4H PRN Wilmar Delgadillo MD   2.5 mg at 05/21/25 0753    morphine (PF) injection 2 mg  2 mg IntraVENous Q1H PRN Wilmar Delgadillo MD   2 mg at 05/22/25 0629    LORazepam (ATIVAN) injection 1 mg  1 mg IntraVENous Q1H PRN Wilmar Delgadillo MD   1 mg at 05/22/25 0030    ceFEPIme (MAXIPIME) 2,000 mg in sodium chloride 0.9 % 100 mL IVPB (addEASE)  2,000 mg IntraVENous Q8H Wilmar Delgadillo MD 25 mL/hr at 05/22/25 0631 2,000 mg at 05/22/25 0631    nystatin (MYCOSTATIN) 442394 UNIT/ML suspension 500,000 Units  5 mL Oral 4x Daily Wilmar Delgadillo MD        vancomycin 1500 mg in sodium chloride 0.9% 300 mL IVPB  1,500 mg IntraVENous Q12H Wilmar Delgadillo MD   Stopped at 05/22/25 0624            Or    acetaminophen (TYLENOL) suppository 650 mg  650 mg Rectal Q6H PRN Wilmar Delgadillo MD        guaiFENesin (MUCINEX) extended release tablet 600 mg  600 mg Oral BID Wilmar Delgadillo MD   600 mg at 05/21/25 0841       Allergies:  Allergies   Allergen Reactions    Bee Venom Anaphylaxis    Chloraseptic [Phenol] Anaphylaxis       Social History:  Social History     Socioeconomic History    Marital status:      Spouse name: Not on file    Number of children: Not on file    Years of education: Not on file    Highest education level: Not on file   Occupational History    Not on file   Tobacco Use    Smoking status: Former     Current packs/day: 2.00     Types: Cigarettes    Smokeless tobacco: Never   Substance and Sexual Activity    Alcohol use: Yes     Comment: 6 beers per week    Drug use: No    Sexual activity: Not on file   Other Topics Concern    Not on file   Social History Narrative    Not on file     Social Drivers of Health     Financial Resource Strain: Not on file   Food Insecurity: No Food Insecurity (5/20/2025)    Hunger Vital Sign     Worried About Running Out of Food in the Last Year: Never true     Ran Out of Food in the Last Year: Never true   Transportation Needs: No Transportation Needs (5/20/2025)    PRAPARE - Transportation     Lack of Transportation (Medical): No     Lack of Transportation (Non-Medical): No   Physical Activity: Not on file   Stress: Not on file   Social Connections: Not on file   Intimate Partner Violence: Not on file   Housing Stability: Low Risk  (5/20/2025)    Housing Stability Vital Sign     Unable to Pay for Housing in the Last Year: No     Number of Times Moved in the Last Year: 0     Homeless in the Last Year: No          Family History:  No family history on file.    REVIEW OF SYSTEMS:      Given his mental status, this will not be accurate.        PHYSICAL EXAM:      Vitals:  Patient Vitals for the past 24 hrs:   BP Temp Temp src Pulse Resp SpO2 Height  hours.      Radiology Review:  CT CHEST PULMONARY EMBOLISM W CONTRAST  Narrative: EXAM: CT CHEST PULMONARY EMBOLISM W CONTRAST    INDICATION: Pulmonary embolism, shortness of breath,    COMPARISON: 1/30/2025    TECHNIQUE: Axial CT imaging obtained through the chest with IV contrast using CT pulmonary angiogram protocol. Axial images, multiplanar reformatted images and maximum intensity projection images were reviewed for CT angiographic technique.      Individualized dose optimization technique was used in order to meet ALARA standards for radiation dose reduction.  In addition to vendor specific dose reduction algorithms, the dose reduction techniques vary based on the specific scanner utilized but   frequently include automated exposure control, adjustment of the mA and/or kV according to patient size, and use of iterative reconstruction technique.    FINDINGS:    DIAGNOSTIC QUALITY: Suboptimal.    PULMONARY EMBOLI: No evidence for pulmonary thromboembolism    RIGHT HEART STRAIN: None.    HEART / GREAT VESSELS: Normal size heart. No pericardial effusion. Thoracic aorta normal diameter without dissection. Left hilar mass encasing the left main pulmonary artery is mildly reduced in size with decreased narrowing of the pulmonary artery   compared to 1/30/2025.     ADENOPATHY: Enlarging subcarinal lymph node 4.6 x 2.9 cm on series 2, image 129. Previously, this measured 4.0 x 1.7 cm. Additional enlarging subcarinal lymph node on the right. Stable right paratracheal lymph node 1.6 x 1.0 cm.    LUNGS AND AIRWAYS: Representative measurement of a portion of the mass at the left hilum and invading the mediastinum is 3.3 x 2.2 cm on series 2, image 123 (previous 7.2 x 4.6 cm).  There are numerous, new bilateral pulmonary nodules, many of which show   central cavitation. Dominant new lesion is located in the left upper lobe 4.5 x 3.7 cm.    PLEURA: No pleural effusions or significant pleural thickening.    CHEST WALL / LOWER

## 2025-05-22 NOTE — PLAN OF CARE
Problem: Confusion  Goal: Confusion, delirium, dementia, or psychosis is improved or at baseline  Description: INTERVENTIONS:1. Assess for possible contributors to thought disturbance, including medications, impaired vision or hearing, underlying metabolic abnormalities, dehydration, psychiatric diagnoses, and notify attending LIP2. [place] high risk fall precautions, as indicated3. Provide frequent short contacts to provide reality reorientation, refocusing and direction4. Decrease environmental stimuli, including noise as appropriate5. Monitor and intervene to maintain adequate nutrition, hydration, elimination, sleep and activity6. If unable to ensure safety without constant attention obtain sitter and review sitter guidelines with assigned personnel7. Initiate Psychosocial CNS and Spiritual Care consult, as indicated  Outcome: Not Progressing     Problem: Behavior  Goal: Pt/Family maintain appropriate behavior and adhere to behavioral management agreement, if implemented  Description: INTERVENTIONS:1. Assess patient/family's coping skills and  non-compliant behavior (including use of illegal substances)2. Notify security of behavior or suspected illegal substances which indicate the need for search of the family and/or belongings3. Encourage verbalization of thoughts and concerns in a socially appropriate manner4. Utilize positive, consistent limit setting strategies supporting safety of patient, staff and others5. Encourage participation in the decision making process about the behavioral management agreement6. If a visitor's behavior poses a threat to safety call refer to organization policy.7. Initiate consult with , Psychosocial CNS, Spiritual Care as appropriate  Outcome: Not Progressing     Problem: Safety - Adult  Goal: Free from fall injury  Outcome: Progressing     Problem: Discharge Planning  Goal: Discharge to home or other facility with appropriate resources  Outcome:  for search of the family and/or belongings3. Encourage verbalization of thoughts and concerns in a socially appropriate manner4. Utilize positive, consistent limit setting strategies supporting safety of patient, staff and others5. Encourage participation in the decision making process about the behavioral management agreement6. If a visitor's behavior poses a threat to safety call refer to organization policy.7. Initiate consult with , Psychosocial CNS, Spiritual Care as appropriate  Outcome: Not Progressing

## 2025-05-22 NOTE — PROGRESS NOTES
PALLIATIVE MEDICINE PROGRESS NOTE     Patient name:Ricardo Campbell    MRN:[id number] :1960  Room/Bed:0356/0356-01    LOS: 2 days        ASSESSMENT/RECOMMENDATIONS     65 y.o. male with  IV SCLC (2024), COPD, MS, neuropathy and depression who lives at home with his ill wife.  PPS ~50.  Patient has needed help to get into his scooter recently.  Has decreased appetite and body weight is down 19% in past 5 months. BMI 24, albumin 2.9.  Reports difficulty swallowing due to his MS. Patient currently receiving 3rd line treatment for his cancer at [facility] and is followed by [facility]'s palliative care.  Patient was admitted with sepsis, HAP and worsening fatigue.  He feels his pain is well managed.  He is agitated and intermittently confused.  He is on room air. He looks uncomfortable with his breathing and constant coughing and clearing of throat.     Goals of care:  Comfort care.  Attempt to improve mental status with treatment of infection before returning home with hospice, but not if such interventions prove intolerable or futile.  Hospice to follow up with family today.  Code status is DNR CC      Recommendations:   Continue to assess for pain.  Current pain/anxiety management regimen seems to be effective  Consider setting time trial on proposed continuation of abx therapy so as not to inappropriately delay hospice services and comfort/support to family      Disposition/Discharge Plan :    - patient will forego further cancer treatment and enroll in hospice at home  - in the meantime, family trying to determine if they want to continue abx treatment for a few days in hopes of clearing infection and improving patient's mental status  - Spoke with [facility], and they plan to  meet with family today  - patient comfortable on current medication regimen  - will continue to follow      Patient/Family Goals of Care :    25    Spoke with daughter.  Patient lethargic and confused.  Daughter states that patient was  nothing else that can be done for him.  Daughter was eventually able to re-orient him (he was talking about his prior wife - Renee's mom- when she ).   He intermittently discussed things out of context or totally unrelated to the topic being discussed.      Had long conversation about patient's hopes, needs, quality of life, expectations, and worries.  Patient states he wants to get back on his scooter and be able to do normal things again.  He wants to live as long as he can.  He feels like stopping treatment, even if it is not helping, is the same as giving up.  He wants to live for his grand kids and wife.  He does not want hospice care at this time.  He thinks he might like a nursing home if needed.   Shared concern that we won't be able to get his function back to where he wants it.  Shared concern for limited time and how he wants to use that valuable time. Patient agrees, and even says he probably won't improve, but does not want to stop treatment.  He feels like the only appropriate time to stop treatment is when its not working anymore at all.  He did say he trusts his oncologist, and if his oncologist recommended hospice, he would follow it those recommendations.     Daughter at the bedside is extremely supportive and appropriately tearful. Daughter worries for patient's comfort and possible lack of long-term/meaningful benefit with continued cancer treatment.  Daughter concerned about the etiology of patient's confusion and his wife's ability to make a good decision for him.     Patient states his pain is well controlled at baseline.  He feels like the IV morphine today really helped.  He feels like his depression is \"great\".  He does have difficulty swallowing and has had a decreased appetite.  He is willing to stay in the hospital and wants to have any further work up that might be needed (such as brain imaging).  Daughter states the physical care giving needs at home have definitely increased and

## 2025-05-22 NOTE — DISCHARGE SUMMARY
Hospital Medicine Discharge Summary    Patient: Ricardo Campbell   : 1960     Hospital:  [facility]  Admit Date: 2025   Discharge Date:   25  Disposition:  Home w/ Hospice   Code status:  DNR/CC  Condition at Discharge: Terminal  Primary Care Provider: No primary care provider on file.    Admitting Provider: Wilmar Delgadillo MD  Discharge Provider: Wilmar Delgadillo MD     Discharge Diagnoses:      Active Hospital Problems    Diagnosis     Acute pneumonia [J18.9]     Severe protein-calorie malnutrition [E43]        Presenting Admission History:      65 y.o. male who presented to [facility] with fatigue, SOB. PMHx significant for lung cancer, HTN, HLD, GERD. Patient has lung cancer is restarted chemo yesterday. He completed radiation prior to this. Even before he got his chemo, he was having increasing weakness for the past few days. He has had a worsening cough as well. He has a weak cough overall and has had difficulty clearing mucus from his lungs. He denies fevers or chills. He denies recent sick contacts.      Assessment/Plan:      Pneumonia  - Health Care Associated Pneumonia.    - possibly Gram Negative   - Started on Vancomycin and Cefepime on 25. Stopped on discharge. Enrolling in hospice.     Sepsis - POA  - W/ Leukocytosis, Tachycardia, and Elevated Lactate  - Secondary to above infection  - s/p IVF  - Started on empiric vanc, cefepime on 25     Lung cancer  - on active treatment  - continue pain control  - continue inhalers  - hem/onc consulted, no further benefit from aggressive care  - palliative care consulted  - hospice consulted. Enrolling on discharge     Hypertension   - Typically controlled on home regimen   - stopped metoprolol, cardizem     Hyperlipidemia   - Typically controlled on home regimen   - stopped statin     GERD   - without active signs of GI Bleeding and/or symptoms of dysphagia and/or odynophagia  - No evidence of

## 2025-05-22 NOTE — PROGRESS NOTES
4 Eyes Skin Assessment     NAME:  Ricardo Campbell  YOB: 1960  MEDICAL RECORD NUMBER:  [id number]    The patient is being assessed for  Other low eron    I agree that at least one RN has performed a thorough Head to Toe Skin Assessment on the patient. ALL assessment sites listed below have been assessed.      Areas assessed by both nurses:    Head, Face, Ears, Shoulders, Back, Chest, Arms, Elbows, Hands, Sacrum. Buttock, Coccyx, Ischium, Legs. Feet and Heels, and Under Medical Devices         Does the Patient have a Wound? No noted wound(s)       Eron Prevention initiated by RN: Yes  Wound Care Orders initiated by RN: No    Pressure Injury (Stage 3,4, Unstageable, DTI, NWPT, and Complex wounds) if present, place Wound referral order by RN under : No    New Ostomies, if present place, Ostomy referral order under : No     Nurse 1 eSignature: Electronically signed by Mima Sterling RN on 5/22/25 at 6:14 AM EDT    **SHARE this note so that the co-signing nurse can place an eSignature**    Nurse 2 eSignature: Electronically signed by Qian Wakefield RN on 5/22/25 at 6:16 AM EDT

## 2025-05-22 NOTE — CARE COORDINATION
CASE MANAGEMENT DISCHARGE SUMMARY      Discharge to: Home with HOC    Transportation:       Medical Transport explained to pt/family. Pt/family voice no agency preference.    Agency used:[facility]   time: 1830   Ambulance form completed: Yes    Confirmed discharge plan with:     Patient: yes     Family:  yes     RN, name: Homa De La O RN

## 2025-05-23 LAB
BACTERIA BLD CULT ORG #2: ABNORMAL
BACTERIA BLD CULT ORG #2: ABNORMAL
ORGANISM: ABNORMAL
ORGANISM: ABNORMAL

## 2025-05-24 LAB — BACTERIA BLD CULT: NORMAL
